# Patient Record
Sex: FEMALE | Race: ASIAN | NOT HISPANIC OR LATINO | URBAN - METROPOLITAN AREA
[De-identification: names, ages, dates, MRNs, and addresses within clinical notes are randomized per-mention and may not be internally consistent; named-entity substitution may affect disease eponyms.]

---

## 2020-05-15 ENCOUNTER — INPATIENT (INPATIENT)
Facility: HOSPITAL | Age: 50
LOS: 12 days | Discharge: HOME | End: 2020-05-28
Attending: HOSPITALIST | Admitting: HOSPITALIST
Payer: COMMERCIAL

## 2020-05-15 VITALS
OXYGEN SATURATION: 98 % | DIASTOLIC BLOOD PRESSURE: 52 MMHG | RESPIRATION RATE: 18 BRPM | HEART RATE: 86 BPM | TEMPERATURE: 100 F | SYSTOLIC BLOOD PRESSURE: 114 MMHG

## 2020-05-15 DIAGNOSIS — R60.1 GENERALIZED EDEMA: ICD-10-CM

## 2020-05-15 DIAGNOSIS — R79.89 OTHER SPECIFIED ABNORMAL FINDINGS OF BLOOD CHEMISTRY: ICD-10-CM

## 2020-05-15 DIAGNOSIS — N17.9 ACUTE KIDNEY FAILURE, UNSPECIFIED: ICD-10-CM

## 2020-05-15 DIAGNOSIS — R91.1 SOLITARY PULMONARY NODULE: ICD-10-CM

## 2020-05-15 DIAGNOSIS — Z98.890 OTHER SPECIFIED POSTPROCEDURAL STATES: Chronic | ICD-10-CM

## 2020-05-15 DIAGNOSIS — D72.829 ELEVATED WHITE BLOOD CELL COUNT, UNSPECIFIED: ICD-10-CM

## 2020-05-15 DIAGNOSIS — Z29.9 ENCOUNTER FOR PROPHYLACTIC MEASURES, UNSPECIFIED: ICD-10-CM

## 2020-05-15 DIAGNOSIS — I95.9 HYPOTENSION, UNSPECIFIED: ICD-10-CM

## 2020-05-15 DIAGNOSIS — D64.9 ANEMIA, UNSPECIFIED: ICD-10-CM

## 2020-05-15 LAB
ALBUMIN SERPL ELPH-MCNC: 1.7 G/DL — LOW (ref 3.3–5)
ALBUMIN SERPL ELPH-MCNC: 1.8 G/DL — LOW (ref 3.3–5)
ALP SERPL-CCNC: 117 U/L — SIGNIFICANT CHANGE UP (ref 40–120)
ALP SERPL-CCNC: 126 U/L — HIGH (ref 40–120)
ALT FLD-CCNC: 76 U/L — HIGH (ref 4–33)
ALT FLD-CCNC: 80 U/L — HIGH (ref 4–33)
ANION GAP SERPL CALC-SCNC: 7 MMO/L — SIGNIFICANT CHANGE UP (ref 7–14)
ANION GAP SERPL CALC-SCNC: 8 MMO/L — SIGNIFICANT CHANGE UP (ref 7–14)
ANISOCYTOSIS BLD QL: SIGNIFICANT CHANGE UP
APTT BLD: 39.7 SEC — HIGH (ref 27.5–36.3)
AST SERPL-CCNC: 149 U/L — HIGH (ref 4–32)
AST SERPL-CCNC: 166 U/L — HIGH (ref 4–32)
BASOPHILS # BLD AUTO: 0.01 K/UL — SIGNIFICANT CHANGE UP (ref 0–0.2)
BASOPHILS NFR BLD AUTO: 0.1 % — SIGNIFICANT CHANGE UP (ref 0–2)
BASOPHILS NFR SPEC: 0 % — SIGNIFICANT CHANGE UP (ref 0–2)
BILIRUB SERPL-MCNC: 1.5 MG/DL — HIGH (ref 0.2–1.2)
BILIRUB SERPL-MCNC: 1.5 MG/DL — HIGH (ref 0.2–1.2)
BLASTS # FLD: 0 % — SIGNIFICANT CHANGE UP (ref 0–0)
BLD GP AB SCN SERPL QL: POSITIVE — SIGNIFICANT CHANGE UP
BUN SERPL-MCNC: 24 MG/DL — HIGH (ref 7–23)
BUN SERPL-MCNC: 24 MG/DL — HIGH (ref 7–23)
CALCIUM SERPL-MCNC: 7 MG/DL — LOW (ref 8.4–10.5)
CALCIUM SERPL-MCNC: 7.1 MG/DL — LOW (ref 8.4–10.5)
CHLORIDE SERPL-SCNC: 110 MMOL/L — HIGH (ref 98–107)
CHLORIDE SERPL-SCNC: 111 MMOL/L — HIGH (ref 98–107)
CO2 SERPL-SCNC: 20 MMOL/L — LOW (ref 22–31)
CO2 SERPL-SCNC: 20 MMOL/L — LOW (ref 22–31)
CREAT SERPL-MCNC: 1.65 MG/DL — HIGH (ref 0.5–1.3)
CREAT SERPL-MCNC: 1.68 MG/DL — HIGH (ref 0.5–1.3)
ELLIPTOCYTES BLD QL SMEAR: SLIGHT — SIGNIFICANT CHANGE UP
EOSINOPHIL # BLD AUTO: 0.09 K/UL — SIGNIFICANT CHANGE UP (ref 0–0.5)
EOSINOPHIL NFR BLD AUTO: 0.6 % — SIGNIFICANT CHANGE UP (ref 0–6)
EOSINOPHIL NFR FLD: 0 % — SIGNIFICANT CHANGE UP (ref 0–6)
FERRITIN SERPL-MCNC: 30.14 NG/ML — SIGNIFICANT CHANGE UP (ref 15–150)
GIANT PLATELETS BLD QL SMEAR: PRESENT — SIGNIFICANT CHANGE UP
GLUCOSE SERPL-MCNC: 100 MG/DL — HIGH (ref 70–99)
GLUCOSE SERPL-MCNC: 81 MG/DL — SIGNIFICANT CHANGE UP (ref 70–99)
HCG SERPL-ACNC: < 5 MIU/ML — SIGNIFICANT CHANGE UP
HCT VFR BLD CALC: 14.5 % — CRITICAL LOW (ref 34.5–45)
HGB BLD-MCNC: 3.8 G/DL — CRITICAL LOW (ref 11.5–15.5)
HYPOCHROMIA BLD QL: SIGNIFICANT CHANGE UP
IMM GRANULOCYTES NFR BLD AUTO: 0.7 % — SIGNIFICANT CHANGE UP (ref 0–1.5)
INR BLD: 1.78 — HIGH (ref 0.88–1.17)
IRON SATN MFR SERPL: 12 UG/DL — LOW (ref 30–160)
IRON SATN MFR SERPL: 173 UG/DL — SIGNIFICANT CHANGE UP (ref 140–530)
LDH SERPL L TO P-CCNC: 730 U/L — HIGH (ref 135–225)
LYMPHOCYTES # BLD AUTO: 0.66 K/UL — LOW (ref 1–3.3)
LYMPHOCYTES # BLD AUTO: 4.2 % — LOW (ref 13–44)
LYMPHOCYTES NFR SPEC AUTO: 0.9 % — LOW (ref 13–44)
MAGNESIUM SERPL-MCNC: 2.1 MG/DL — SIGNIFICANT CHANGE UP (ref 1.6–2.6)
MCHC RBC-ENTMCNC: 18.1 PG — LOW (ref 27–34)
MCHC RBC-ENTMCNC: 26.2 % — LOW (ref 32–36)
MCV RBC AUTO: 69 FL — LOW (ref 80–100)
METAMYELOCYTES # FLD: 0 % — SIGNIFICANT CHANGE UP (ref 0–1)
MICROCYTES BLD QL: SIGNIFICANT CHANGE UP
MONOCYTES # BLD AUTO: 0.79 K/UL — SIGNIFICANT CHANGE UP (ref 0–0.9)
MONOCYTES NFR BLD AUTO: 5 % — SIGNIFICANT CHANGE UP (ref 2–14)
MONOCYTES NFR BLD: 3.5 % — SIGNIFICANT CHANGE UP (ref 2–9)
MYELOCYTES NFR BLD: 0 % — SIGNIFICANT CHANGE UP (ref 0–0)
NEUTROPHIL AB SER-ACNC: 93.9 % — HIGH (ref 43–77)
NEUTROPHILS # BLD AUTO: 14.05 K/UL — HIGH (ref 1.8–7.4)
NEUTROPHILS NFR BLD AUTO: 89.4 % — HIGH (ref 43–77)
NEUTS BAND # BLD: 1.7 % — SIGNIFICANT CHANGE UP (ref 0–6)
NRBC # FLD: 0 K/UL — SIGNIFICANT CHANGE UP (ref 0–0)
NT-PROBNP SERPL-SCNC: 3601 PG/ML — SIGNIFICANT CHANGE UP
OB PNL STL: NEGATIVE — SIGNIFICANT CHANGE UP
OTHER - HEMATOLOGY %: 0 — SIGNIFICANT CHANGE UP
OVALOCYTES BLD QL SMEAR: SIGNIFICANT CHANGE UP
PHOSPHATE SERPL-MCNC: 3 MG/DL — SIGNIFICANT CHANGE UP (ref 2.5–4.5)
PLATELET # BLD AUTO: 131 K/UL — LOW (ref 150–400)
PLATELET COUNT - ESTIMATE: SIGNIFICANT CHANGE UP
PMV BLD: SIGNIFICANT CHANGE UP FL (ref 7–13)
POIKILOCYTOSIS BLD QL AUTO: SIGNIFICANT CHANGE UP
POLYCHROMASIA BLD QL SMEAR: SIGNIFICANT CHANGE UP
POTASSIUM SERPL-MCNC: 3.1 MMOL/L — LOW (ref 3.5–5.3)
POTASSIUM SERPL-MCNC: 3.4 MMOL/L — LOW (ref 3.5–5.3)
POTASSIUM SERPL-SCNC: 3.1 MMOL/L — LOW (ref 3.5–5.3)
POTASSIUM SERPL-SCNC: 3.4 MMOL/L — LOW (ref 3.5–5.3)
PROMYELOCYTES # FLD: 0 % — SIGNIFICANT CHANGE UP (ref 0–0)
PROT SERPL-MCNC: 4.9 G/DL — LOW (ref 6–8.3)
PROT SERPL-MCNC: 5 G/DL — LOW (ref 6–8.3)
PROTHROM AB SERPL-ACNC: 20.7 SEC — HIGH (ref 9.8–13.1)
RBC # BLD: 2.1 M/UL — LOW (ref 3.8–5.2)
RBC # FLD: 21.2 % — HIGH (ref 10.3–14.5)
REVIEW TO FOLLOW: YES — SIGNIFICANT CHANGE UP
RH IG SCN BLD-IMP: POSITIVE — SIGNIFICANT CHANGE UP
RH IG SCN BLD-IMP: POSITIVE — SIGNIFICANT CHANGE UP
SARS-COV-2 RNA SPEC QL NAA+PROBE: SIGNIFICANT CHANGE UP
SCHISTOCYTES BLD QL AUTO: SLIGHT — SIGNIFICANT CHANGE UP
SODIUM SERPL-SCNC: 137 MMOL/L — SIGNIFICANT CHANGE UP (ref 135–145)
SODIUM SERPL-SCNC: 139 MMOL/L — SIGNIFICANT CHANGE UP (ref 135–145)
TROPONIN T, HIGH SENSITIVITY: 77 NG/L — CRITICAL HIGH (ref ?–14)
TROPONIN T, HIGH SENSITIVITY: 85 NG/L — CRITICAL HIGH (ref ?–14)
UIBC SERPL-MCNC: 161 UG/DL — SIGNIFICANT CHANGE UP (ref 110–370)
VARIANT LYMPHS # BLD: 0 % — SIGNIFICANT CHANGE UP
WBC # BLD: 15.71 K/UL — HIGH (ref 3.8–10.5)
WBC # FLD AUTO: 15.71 K/UL — HIGH (ref 3.8–10.5)

## 2020-05-15 PROCEDURE — 71045 X-RAY EXAM CHEST 1 VIEW: CPT | Mod: 26

## 2020-05-15 PROCEDURE — 74176 CT ABD & PELVIS W/O CONTRAST: CPT | Mod: 26

## 2020-05-15 PROCEDURE — 99223 1ST HOSP IP/OBS HIGH 75: CPT | Mod: GC

## 2020-05-15 RX ORDER — VANCOMYCIN HCL 1 G
1000 VIAL (EA) INTRAVENOUS ONCE
Refills: 0 | Status: COMPLETED | OUTPATIENT
Start: 2020-05-15 | End: 2020-05-15

## 2020-05-15 RX ORDER — ALBUTEROL 90 UG/1
2 AEROSOL, METERED ORAL EVERY 6 HOURS
Refills: 0 | Status: DISCONTINUED | OUTPATIENT
Start: 2020-05-15 | End: 2020-05-28

## 2020-05-15 RX ORDER — CETIRIZINE HYDROCHLORIDE 10 MG/1
1 TABLET ORAL
Qty: 0 | Refills: 0 | DISCHARGE

## 2020-05-15 RX ORDER — ACETAMINOPHEN 500 MG
975 TABLET ORAL ONCE
Refills: 0 | Status: COMPLETED | OUTPATIENT
Start: 2020-05-15 | End: 2020-05-15

## 2020-05-15 RX ORDER — POTASSIUM CHLORIDE 20 MEQ
1 PACKET (EA) ORAL
Qty: 0 | Refills: 0 | DISCHARGE

## 2020-05-15 RX ORDER — ALBUMIN HUMAN 25 %
50 VIAL (ML) INTRAVENOUS ONCE
Refills: 0 | Status: COMPLETED | OUTPATIENT
Start: 2020-05-15 | End: 2020-05-15

## 2020-05-15 RX ORDER — PIPERACILLIN AND TAZOBACTAM 4; .5 G/20ML; G/20ML
3.38 INJECTION, POWDER, LYOPHILIZED, FOR SOLUTION INTRAVENOUS ONCE
Refills: 0 | Status: COMPLETED | OUTPATIENT
Start: 2020-05-15 | End: 2020-05-15

## 2020-05-15 RX ORDER — ALBUTEROL 90 UG/1
2 AEROSOL, METERED ORAL
Qty: 0 | Refills: 0 | DISCHARGE

## 2020-05-15 RX ORDER — PIPERACILLIN AND TAZOBACTAM 4; .5 G/20ML; G/20ML
3.38 INJECTION, POWDER, LYOPHILIZED, FOR SOLUTION INTRAVENOUS EVERY 8 HOURS
Refills: 0 | Status: DISCONTINUED | OUTPATIENT
Start: 2020-05-15 | End: 2020-05-16

## 2020-05-15 RX ORDER — ALBUMIN HUMAN 25 %
50 VIAL (ML) INTRAVENOUS ONCE
Refills: 0 | Status: DISCONTINUED | OUTPATIENT
Start: 2020-05-15 | End: 2020-05-15

## 2020-05-15 RX ORDER — VANCOMYCIN HCL 1 G
1000 VIAL (EA) INTRAVENOUS EVERY 24 HOURS
Refills: 0 | Status: DISCONTINUED | OUTPATIENT
Start: 2020-05-15 | End: 2020-05-16

## 2020-05-15 RX ORDER — POTASSIUM CHLORIDE 20 MEQ
40 PACKET (EA) ORAL EVERY 4 HOURS
Refills: 0 | Status: COMPLETED | OUTPATIENT
Start: 2020-05-15 | End: 2020-05-16

## 2020-05-15 RX ADMIN — Medication 40 MILLIEQUIVALENT(S): at 23:36

## 2020-05-15 RX ADMIN — Medication 1000 MILLIGRAM(S): at 17:48

## 2020-05-15 RX ADMIN — Medication 975 MILLIGRAM(S): at 14:45

## 2020-05-15 RX ADMIN — Medication 40 MILLIEQUIVALENT(S): at 18:59

## 2020-05-15 RX ADMIN — PIPERACILLIN AND TAZOBACTAM 200 GRAM(S): 4; .5 INJECTION, POWDER, LYOPHILIZED, FOR SOLUTION INTRAVENOUS at 14:45

## 2020-05-15 RX ADMIN — Medication 50 MILLILITER(S): at 17:46

## 2020-05-15 RX ADMIN — Medication 250 MILLIGRAM(S): at 15:13

## 2020-05-15 NOTE — ED ADULT NURSE REASSESSMENT NOTE - NS ED NURSE REASSESS COMMENT FT1
Break coverage RN: PT A&Ox4, denies pain discomfort, SOB/ dyspnea at this time. PT hypotensive as noted, no change in symptoms or mental status at this time. MAR Md notified and aware. Contacted blood bank, PRBCs not ready at this time. Albumin infused as per MD and orders. VS as noted. Will continue to monitor for safety and comfort.

## 2020-05-15 NOTE — H&P ADULT - PROBLEM SELECTOR PLAN 5
Trop elevated to 85 initially, now downtrending, patient not with CP, EKG without evidence to suggest ischemic event, suspect in setting of demand supply mismatch and worsening kidney function   -monitor   -obtain cardiac echo

## 2020-05-15 NOTE — H&P ADULT - NSICDXFAMILYHX_GEN_ALL_CORE_FT
FAMILY HISTORY:  FH: hypertension, Father  FH: ovarian cancer, Sister  FH: type 2 diabetes, Mother and Father

## 2020-05-15 NOTE — H&P ADULT - NSHPREVIEWOFSYSTEMS_GEN_ALL_CORE
Constitutional: denies fevers, chills, night sweats, weight loss,+fatigue,+decreased appetite and PO intake, +generalized weakness   HEENT: denies visual changes, hearing changes, rhinitis, odynophagia, or dysphagia  Cardiovascular: denies palpitations, chest pain, +BLE edema  Respiratory: denies SOB, wheezing  Gastrointestinal: denies N/V/D, hematochezia, melena, +abdominal swelling   : denies dysuria, hematuria  MSK: denies weakness, joint pain  Neuro: no numbness or tingling  Psych: no depression or anxiety  Skin: denies new rashes or masses

## 2020-05-15 NOTE — H&P ADULT - PROBLEM SELECTOR PLAN 7
Likely in setting of hepatic congestion, ascites demonstrated on CT but no evidence of cirrhosis   -f/u Hep panel  -monitor

## 2020-05-15 NOTE — ED ADULT NURSE NOTE - OBJECTIVE STATEMENT
Pt received in red room #3. C/o generalized weakness, SOB, and low Hgb (4.0). Pancreatic tumor removed in 2011. Pt verbalized swelling started about 2 months ago. Weakness started "2 weeks ago." Was prescribed Lasix  yesterday and only took it once. Pt A&Ox4, ambulatory at baseline but unable to ambulate due to weakness. Breathing even and unlabored, complains of SOB on exertion. RR 20 and Spo2 100 on room air. Sinus rhythm on the monitor, HR 88. Denies chest pain or palpitations. Pt febrile with 100.1F rectal. 4+ pitting edema on lower extremities bilat. Edema also noted on left extremity and throughout body. Stage 1 pressure ulcer noted on left buttock. 20G IV placed on right AC. Labs drawn and sent. MD at bedside. Will continue to monitor. Pt received in red room #3. C/o generalized weakness, SOB, and low Hgb (4.0). Pancreatic tumor removed in 2011. Pt verbalized swelling started about 2 months ago. Weakness started "2 weeks ago." Was prescribed Lasix  yesterday and only took it once. Pt A&Ox4, ambulatory at baseline but unable to ambulate due to weakness. Breathing even and unlabored, complains of SOB on exertion. RR 20 and Spo2 100 on room air. Sinus rhythm on the monitor, HR 88. Denies chest pain or palpitations. Pt febrile with 100.1F rectal. 4+ pitting edema on lower extremities bilat. Edema also noted on left extremity and throughout body. Abdomen distended, firm to touch and non-tender. Stage 1 pressure ulcer noted on left buttock. 20G IV placed on right AC. Labs drawn and sent. MD at bedside. Will continue to monitor.

## 2020-05-15 NOTE — ED PROVIDER NOTE - PHYSICAL EXAMINATION
*Gen: appears pale, AAO*3  *HEENT: NC/AT, dry mucous membranes, airway patent, trachea midline  *CV: RRR, S1/S2 present, holosystolic murmur, 4+ pitting edema  *Resp: no respiratory distress, LCTAB, no wheezing/rales  *Abd: distended, soft N/Tx4, no guarding or rigidity  *Neuro: no focal neuro deficits, moving all limbs appropriately  *Extremities: no gross deformity, wasting of the UE  *Skin: no rashes  ~ Zbigniew Cochran M.D.

## 2020-05-15 NOTE — ED ADULT NURSE REASSESSMENT NOTE - NS ED NURSE REASSESS COMMENT FT1
Pt is A&Ox4, denies SOB, chest pain, dizziness, or pruritus. Pt resting, denies any discomfort. Will continue to monitor.

## 2020-05-15 NOTE — H&P ADULT - PROBLEM SELECTOR PLAN 2
Pt with hypotension to the 90s/40s.  Suspect in setting of intravascular depletion and severe anemia.   -will hold lasix until BP improves  -blood transfusion as above  -s/p albumin 25% 50ml, can give additional 50  -if persists can give midodrine and consider MICU eval

## 2020-05-15 NOTE — H&P ADULT - PROBLEM SELECTOR PLAN 8
Pt with pulmonary nodules not on CT scan, could possibly be malignancy  -will obtain CT chest to further characterize Pt with pulmonary nodules not on CT scan, could possibly be malignancy  -will obtain CT chest to further characterize  results d/w patinet

## 2020-05-15 NOTE — H&P ADULT - ATTENDING COMMENTS
Patient seen and examined.  Case discussed with house staff.  Agree with above as edited.   Patient is a 49yoF with h/o pancreatic mass (benign per patient) s/p Whipple in 2011 who presents to ED with weakness, edema and anemia found on blood work in PMD's office yesterday. Found to have anemia with mild hypotension, borderline fever, leukocytosis.   Anemia with hypotension - transfusion of pRBCs pending - given presence of Abs, match is taking a little longer than usual - giving albumin in interim. Monitor post tx CBC.  Pulmonary nodules - patient with h/o pancreatic mass s/p resection. Dedicated CT chest ordered.  Leukocytosis - given hypotension and borderline fever, will cover empirically with broad spectrum Abx. BCx pending  SCOTT vs CKD - suspect SCOTT due to 3rd spacing - monitor Cr post pRBCs  Anasarca - Will hold lasix for now given hypotension. May require eventual paracentesis but will defer until BP stable.  Hypokalemia - Replete with KCl and monitor.   VTE ppx with PAS.   COVID PCR pending  d/w patient  d/w HS

## 2020-05-15 NOTE — H&P ADULT - ASSESSMENT
49 year-old female with history of pancreatic mass (benign as per patient)  status-post resection in 2011 (s/p whipple) who presented to the ED sent in from her PCP office due to abnormal labs and weakness. Patient found with microcytic anemia to 3.6. Also with thrombocytopenia, leukocytosis, elevated LFTS. Also with hypotension in the setting of decreased intravascular volume. Also with SCOTT.

## 2020-05-15 NOTE — H&P ADULT - HISTORY OF PRESENT ILLNESS
49 year-old female with history of pancreatic mass (benign as per patient)  status-post resection in 2011 (s/p whipple) who presented to the ED sent in from her PCP office due to abnormal labs and weakness. Patient stated that for the past year she has been experience chronic fatigue, however, for the past three days has been feeling significantly worse, to the point that she was unable to ambulate on her own. She has also noticed diffuse swelling of the abdomen and bilateral lower extremities. Patient visited a new PCP yesterday (first doctor's visit in multiple years), and had blood work done which showed leukocytosis of 20; hb 3.9/hct 15, Cr 1.68 and  transaminitis. In regards to her pancreatic mass, patient said it was discovered when she was pregnant with her second child, and that the mass was benign, but was involved with multiple organs so in addition to resection of the pancreatic head, they had to remove the GB, as well as parts of the  stomach and duodenum. She stated the procedure was done at [facility], however she never had follow-up because she relocated and "could not find a doctor." She denied CP, lightheadness, nausea, vomiting, diarrhea, constipation, melena, hematochezia, hematemesis. She did however note decreased appetite and PO intake.

## 2020-05-15 NOTE — ED PROVIDER NOTE - OBJECTIVE STATEMENT
49 year-old female with history of pancreatic nodule status-post resection in 2011 (w/ GB,     leukocytosis of 20; hb 3.9 hct 15  transaminitis 49 year-old female with history of pancreatic nodule status-post resection in 2011 (w/ GB, partial stomach and duodenum resection) presents to the Emergency Department for weakness.  Patient reports she has been feeling unwell for the past 1-2 months with diffuse swelling of the LE and abdomen.  The past three days patient has been increasingly weak and bed-bound; unable to support herself or walk.  No fevers, chills, nausea, vomiting, chest pain, shortness of breath, abdominal pain.  Patient does not know if she had any BIS.  Seen by Leidy York ([phone]) for the first time and had routine blood work performed which showed leukocytosis of 20; hb 3.9/hct 15, Cr 1.68 and  transaminitis.  No prior lab work to compare.

## 2020-05-15 NOTE — ED ADULT TRIAGE NOTE - CHIEF COMPLAINT QUOTE
c/o generalized weakness and SOB (at night) x 2 days. Pt also has BLE edema x 2-3 months. Pt had blood test done yesterday. Was told this morning Hgb 4.0. Denies any fever/chills, cough. Hx Pancreatic tumor removed 2011.

## 2020-05-15 NOTE — ED PROVIDER NOTE - CLINICAL SUMMARY MEDICAL DECISION MAKING FREE TEXT BOX
49 year-old female with history of pancreatic nodule status-post resection in 2011 (w/ GB, partial stomach and duodenum resection) presents to the Emergency Department for weakness with anasarca.  Symptoms likely secondary to anemia and compounded by decompensated CHF (given anasarca), third spacing from oncological cause.  Febrile to 100.1 in ED; blood cultures ordered.  Plan for blood work, CT, COVID and TBA.

## 2020-05-15 NOTE — H&P ADULT - PROBLEM SELECTOR PLAN 3
Pt with Cr 1.68, unclear baseline, suspect pre-renal SCOTT in the setting of intravascular depletion/ third spacing and hypotension   -albumin and blood as above  -patient will need diuresis when blood pressure stabilizes   -monitor Cr and avoid nephrotoxic agents/ renally dose meds  -f/u UA

## 2020-05-15 NOTE — ED ADULT NURSE REASSESSMENT NOTE - NS ED NURSE REASSESS COMMENT FT1
1st unit of PRBC's initiated infusing at 155ml/hr, ivl is clear and patent, pt made aware of signs and symptoms in which to alert the medical staff, pt is nsr on monitor, breathing even and unlabored, denies any present pain, afebrile at present. Will continue to monitor.

## 2020-05-15 NOTE — ED PROVIDER NOTE - ATTENDING CONTRIBUTION TO CARE
DR. YEE, ATTENDING MD-  I performed a face to face bedside interview with the patient regarding history of present illness, review of symptoms and past medical history. I completed an independent physical exam.  I have discussed the patient's plan of care with the resident.   Documentation as above in the note.    48 y/o female sent in by pcp with anemia.  Worsening edema and fatigue over past few months.  H/o pancreatic nodule resection.  Concern for infection neoplastic process lyte abn chf.  Will need admission.

## 2020-05-15 NOTE — ED ADULT NURSE REASSESSMENT NOTE - NS ED NURSE REASSESS COMMENT FT1
Pt a&ox3, blood bank contacted in order to release PRBC's, blood bank tech stated that they were still preparing the blood for the pt. MD made aware and questioned if she would want the pt to receive uncrossmatched blood, MD stated it was unnecessary at the time, pt is asymptomatic at rest, breathing even and unlabored at rest, denies any present pain, no headache/dizziness/lightheadedness, nsr on monitor, vs taken and documented, ivl clear and patent, pt has iv abx infusing. Will continue to monitor.

## 2020-05-15 NOTE — H&P ADULT - NSHPPHYSICALEXAM_GEN_ALL_CORE
Vital Signs Last 24 Hrs  T(C): 36.7 (15 May 2020 17:50), Max: 37.8 (15 May 2020 13:50)  T(F): 98.1 (15 May 2020 17:50), Max: 100.1 (15 May 2020 13:50)  HR: 77 (15 May 2020 17:50) (73 - 88)  BP: 90/51 (15 May 2020 17:50) (90/51 - 115/54)  BP(mean): 59 (15 May 2020 17:50) (59 - 59)  RR: 19 (15 May 2020 17:50) (18 - 20)  SpO2: 100% (15 May 2020 17:50) (96% - 100%)    PHYSICAL EXAM:  GENERAL: NAD, well-developed  HEAD: Atraumatic, Normocephalic  EYES: EOMI, PERRLA, conjunctiva and sclera clear  NECK: Supple, No JVD  CHEST/LUNG: Clear to auscultation bilaterally; No wheezes/rales/rhonchi  HEART: Regular rate and rhythm; No murmurs, rubs, or gallops  ABDOMEN: Soft, Nontender, Nondistended; Bowel sounds present  EXTREMITIES:  2+ dP pulses b/l, No clubbing, cyanosis, or edema  PSYCH: reactive affect  NEUROLOGY: AAOx3, non-focal  SKIN: No rashes or lesions Vital Signs Last 24 Hrs  T(C): 36.7 (15 May 2020 17:50), Max: 37.8 (15 May 2020 13:50)  T(F): 98.1 (15 May 2020 17:50), Max: 100.1 (15 May 2020 13:50)  HR: 77 (15 May 2020 17:50) (73 - 88)  BP: 90/51 (15 May 2020 17:50) (90/51 - 115/54)  BP(mean): 59 (15 May 2020 17:50) (59 - 59)  RR: 19 (15 May 2020 17:50) (18 - 20)  SpO2: 100% (15 May 2020 17:50) (96% - 100%)    PHYSICAL EXAM:  GENERAL: NAD,  HEAD: Atraumatic, Normocephalic  EYES: EOMI, PERRLA, conjunctiva and sclera clear  NECK: Supple, No JVD  CHEST/LUNG: Decreased breath sounds in bases,   HEART: Regular rate and rhythm; Holosystolic murmur , rubs, or gallops  ABDOMEN: Soft, Nontender, distended with fluid; Bowel sounds present  EXTREMITIES:  2+ dP pulses b/l, No clubbing, cyanosis, 4+pitting edema in BLE   PSYCH: reactive affect  NEUROLOGY: AAOx3, non-focal  SKIN: pale Vital Signs Last 24 Hrs  T(C): 36.7 (15 May 2020 17:50), Max: 37.8 (15 May 2020 13:50)  T(F): 98.1 (15 May 2020 17:50), Max: 100.1 (15 May 2020 13:50)  HR: 77 (15 May 2020 17:50) (73 - 88)  BP: 90/51 (15 May 2020 17:50) (90/51 - 115/54)  BP(mean): 59 (15 May 2020 17:50) (59 - 59)  RR: 19 (15 May 2020 17:50) (18 - 20)  SpO2: 100% (15 May 2020 17:50) (96% - 100%)    PHYSICAL EXAM:  GENERAL: NAD,  HEAD: Atraumatic, Normocephalic  EYES: EOMI, PERRLA, conjunctiva and sclera clear  NECK: Supple, No JVD  CHEST/LUNG: Decreased breath sounds in bases,   HEART: Regular rate and rhythm; Crescendo-decrescendo systolic murmur loudest at RUSB, rubs, or gallops  ABDOMEN: Soft, Nontender, distended with fluid; Bowel sounds present  EXTREMITIES:  2+ dP pulses b/l, No clubbing, cyanosis, 4+pitting edema in BLE   PSYCH: reactive affect  NEUROLOGY: AAOx3, non-focal  SKIN: pale

## 2020-05-15 NOTE — H&P ADULT - PROBLEM SELECTOR PLAN 4
Pt with anasarca  (significant LLE and abdominal swelling)  -in the setting of decreased oncotic pressure/ hypoalbuminemia and mild hypoproteinemia   -albumin as above  -will need diuresis when BP stable vs ?paracentesis  -cardiac echo to assess for HF component

## 2020-05-15 NOTE — H&P ADULT - NSHPLABSRESULTS_GEN_ALL_CORE
Personally reviewed labs.   Personally reviewed imaging.   Personally reviewed EKG.     EKG: Rate 87, , QRS 74, QT//399    < from: CT Abdomen and Pelvis No Cont (05.15.20 @ 15:35) >      EXAM:  CT ABDOMEN AND PELVIS        PROCEDURE DATE:  May 15 2020   IMPRESSION:     Very limited noncontrast examination. Suggest repeat examination with contrast and premedication if appropriate.  Gross ascites.  Marked anasarca.  Mild cardiomegaly.  Several pulmonary nodules. Consider full chest CT.  Probable Whipple procedure.      < from: Xray Chest 1 View AP/PA (05.15.20 @ 14:31) >      EXAM:  XR CHEST AP OR PA 1V        PROCEDURE DATE:  May 15 2020 IMPRESSION:  Bibasilar haziness with indistinct hemidiaphragms and CP angles compatible with small pleural effusions right greater than left. Concomitant underlying airspace consolidative changes including from infectious etiologies cannot be excluded. Grossly clear visualized mid and upper lungs. No pneumothorax.    Prominent appearing cardiac and mediastinal silhouettes however inaccurately assessed on this projection.    Trachea midline.     Unremarkable osseous structures.      < end of copied text >

## 2020-05-15 NOTE — H&P ADULT - PROBLEM SELECTOR PLAN 1
Pt with anemia to 3.8 and chronic fatigue. Microcytic. Suspect iron deficiency vs anemia of chronic illness. Low suspicion for GI Bleed. Concerning for malignancy given she had pancreatic mass (unclear hx). Also consider hemolysis  -f/u iron studies, LDH, haptoglobin, retic count  -transfusing 2 U PRBC, Follow up post transfusion CBC  -monitor HgB and transfuse if <7 Pt with anemia to 3.8 and chronic fatigue. Microcytic. Suspect iron deficiency vs anemia of chronic illness. Low suspicion for GI Bleed. Concerning for malignancy given she had pancreatic mass (unclear hx). Also consider hemolysis  -f/u iron studies, LDH, haptoglobin, retic count  -transfusing 2 U PRBC, Follow up post transfusion CBC  -monitor HgB and transfuse if <7  -a/w generalized weakness; PT consult

## 2020-05-16 ENCOUNTER — RESULT REVIEW (OUTPATIENT)
Age: 50
End: 2020-05-16

## 2020-05-16 LAB
ALBUMIN FLD-MCNC: 0.4 G/DL — SIGNIFICANT CHANGE UP
ALBUMIN SERPL ELPH-MCNC: 1.7 G/DL — LOW (ref 3.3–5)
ALP SERPL-CCNC: 115 U/L — SIGNIFICANT CHANGE UP (ref 40–120)
ALT FLD-CCNC: 67 U/L — HIGH (ref 4–33)
ANION GAP SERPL CALC-SCNC: 7 MMO/L — SIGNIFICANT CHANGE UP (ref 7–14)
APPEARANCE UR: CLEAR — SIGNIFICANT CHANGE UP
APPEARANCE UR: CLEAR — SIGNIFICANT CHANGE UP
AST SERPL-CCNC: 94 U/L — HIGH (ref 4–32)
BASOPHILS # BLD AUTO: 0.02 K/UL — SIGNIFICANT CHANGE UP (ref 0–0.2)
BASOPHILS # BLD AUTO: 0.03 K/UL — SIGNIFICANT CHANGE UP (ref 0–0.2)
BASOPHILS NFR BLD AUTO: 0.2 % — SIGNIFICANT CHANGE UP (ref 0–2)
BASOPHILS NFR BLD AUTO: 0.3 % — SIGNIFICANT CHANGE UP (ref 0–2)
BILIRUB DIRECT SERPL-MCNC: 1.5 MG/DL — HIGH (ref 0.1–0.2)
BILIRUB SERPL-MCNC: 2.6 MG/DL — HIGH (ref 0.2–1.2)
BILIRUB SERPL-MCNC: 3.3 MG/DL — HIGH (ref 0.2–1.2)
BILIRUB UR-MCNC: NEGATIVE — SIGNIFICANT CHANGE UP
BILIRUB UR-MCNC: NEGATIVE — SIGNIFICANT CHANGE UP
BLOOD UR QL VISUAL: NEGATIVE — SIGNIFICANT CHANGE UP
BLOOD UR QL VISUAL: NEGATIVE — SIGNIFICANT CHANGE UP
BODY FLUID TYPE: SIGNIFICANT CHANGE UP
BUN SERPL-MCNC: 23 MG/DL — SIGNIFICANT CHANGE UP (ref 7–23)
CALCIUM SERPL-MCNC: 7.2 MG/DL — LOW (ref 8.4–10.5)
CHLORIDE SERPL-SCNC: 110 MMOL/L — HIGH (ref 98–107)
CLARITY SPEC: SIGNIFICANT CHANGE UP
CO2 SERPL-SCNC: 21 MMOL/L — LOW (ref 22–31)
COLOR FLD: YELLOW — SIGNIFICANT CHANGE UP
COLOR SPEC: YELLOW — SIGNIFICANT CHANGE UP
COLOR SPEC: YELLOW — SIGNIFICANT CHANGE UP
CREAT SERPL-MCNC: 1.5 MG/DL — HIGH (ref 0.5–1.3)
EOSINOPHIL # BLD AUTO: 0.15 K/UL — SIGNIFICANT CHANGE UP (ref 0–0.5)
EOSINOPHIL # BLD AUTO: 0.15 K/UL — SIGNIFICANT CHANGE UP (ref 0–0.5)
EOSINOPHIL NFR BLD AUTO: 1.3 % — SIGNIFICANT CHANGE UP (ref 0–6)
EOSINOPHIL NFR BLD AUTO: 1.6 % — SIGNIFICANT CHANGE UP (ref 0–6)
GLUCOSE FLD-MCNC: 99 MG/DL — SIGNIFICANT CHANGE UP
GLUCOSE SERPL-MCNC: 84 MG/DL — SIGNIFICANT CHANGE UP (ref 70–99)
GLUCOSE UR-MCNC: NEGATIVE — SIGNIFICANT CHANGE UP
GLUCOSE UR-MCNC: NEGATIVE — SIGNIFICANT CHANGE UP
GRAM STN FLD: SIGNIFICANT CHANGE UP
HAPTOGLOB SERPL-MCNC: 35 MG/DL — SIGNIFICANT CHANGE UP (ref 34–200)
HAPTOGLOB SERPL-MCNC: 61 MG/DL — SIGNIFICANT CHANGE UP (ref 34–200)
HAV IGM SER-ACNC: NONREACTIVE — SIGNIFICANT CHANGE UP
HBV CORE IGM SER-ACNC: NONREACTIVE — SIGNIFICANT CHANGE UP
HBV SURFACE AG SER-ACNC: NONREACTIVE — SIGNIFICANT CHANGE UP
HCT VFR BLD CALC: 21.6 % — LOW (ref 34.5–45)
HCT VFR BLD CALC: 23.7 % — LOW (ref 34.5–45)
HCT VFR BLD CALC: 32.4 % — LOW (ref 34.5–45)
HCV AB S/CO SERPL IA: 0.22 S/CO — SIGNIFICANT CHANGE UP (ref 0–0.99)
HCV AB SERPL-IMP: SIGNIFICANT CHANGE UP
HGB BLD-MCNC: 6.4 G/DL — CRITICAL LOW (ref 11.5–15.5)
HGB BLD-MCNC: 7.1 G/DL — LOW (ref 11.5–15.5)
HGB BLD-MCNC: 9.9 G/DL — LOW (ref 11.5–15.5)
IMM GRANULOCYTES NFR BLD AUTO: 0.7 % — SIGNIFICANT CHANGE UP (ref 0–1.5)
IMM GRANULOCYTES NFR BLD AUTO: 1 % — SIGNIFICANT CHANGE UP (ref 0–1.5)
KETONES UR-MCNC: NEGATIVE — SIGNIFICANT CHANGE UP
KETONES UR-MCNC: NEGATIVE — SIGNIFICANT CHANGE UP
LDH SERPL L TO P-CCNC: 124 U/L — SIGNIFICANT CHANGE UP
LDH SERPL L TO P-CCNC: 411 U/L — HIGH (ref 135–225)
LDH SERPL L TO P-CCNC: 450 U/L — HIGH (ref 135–225)
LEUKOCYTE ESTERASE UR-ACNC: NEGATIVE — SIGNIFICANT CHANGE UP
LEUKOCYTE ESTERASE UR-ACNC: NEGATIVE — SIGNIFICANT CHANGE UP
LYMPHOCYTES # BLD AUTO: 0.55 K/UL — LOW (ref 1–3.3)
LYMPHOCYTES # BLD AUTO: 0.57 K/UL — LOW (ref 1–3.3)
LYMPHOCYTES # BLD AUTO: 5 % — LOW (ref 13–44)
LYMPHOCYTES # BLD AUTO: 5.8 % — LOW (ref 13–44)
LYMPHOCYTES NFR FLD: 13 % — SIGNIFICANT CHANGE UP
MACROPHAGES # FLD: 13 % — SIGNIFICANT CHANGE UP
MAGNESIUM SERPL-MCNC: 2.1 MG/DL — SIGNIFICANT CHANGE UP (ref 1.6–2.6)
MANUAL SMEAR VERIFICATION: SIGNIFICANT CHANGE UP
MANUAL SMEAR VERIFICATION: SIGNIFICANT CHANGE UP
MCHC RBC-ENTMCNC: 23.2 PG — LOW (ref 27–34)
MCHC RBC-ENTMCNC: 23.3 PG — LOW (ref 27–34)
MCHC RBC-ENTMCNC: 24.4 PG — LOW (ref 27–34)
MCHC RBC-ENTMCNC: 29.6 % — LOW (ref 32–36)
MCHC RBC-ENTMCNC: 30 % — LOW (ref 32–36)
MCHC RBC-ENTMCNC: 30.6 % — LOW (ref 32–36)
MCV RBC AUTO: 77.7 FL — LOW (ref 80–100)
MCV RBC AUTO: 78.3 FL — LOW (ref 80–100)
MCV RBC AUTO: 79.8 FL — LOW (ref 80–100)
MESOTHL CELL # FLD: 5 % — SIGNIFICANT CHANGE UP
MONOCYTES # BLD AUTO: 0.79 K/UL — SIGNIFICANT CHANGE UP (ref 0–0.9)
MONOCYTES # BLD AUTO: 1.11 K/UL — HIGH (ref 0–0.9)
MONOCYTES # FLD: 8 % — SIGNIFICANT CHANGE UP
MONOCYTES NFR BLD AUTO: 8.3 % — SIGNIFICANT CHANGE UP (ref 2–14)
MONOCYTES NFR BLD AUTO: 9.7 % — SIGNIFICANT CHANGE UP (ref 2–14)
NEUTROPHILS # BLD AUTO: 7.98 K/UL — HIGH (ref 1.8–7.4)
NEUTROPHILS # BLD AUTO: 9.5 K/UL — HIGH (ref 1.8–7.4)
NEUTROPHILS NFR BLD AUTO: 82.7 % — HIGH (ref 43–77)
NEUTROPHILS NFR BLD AUTO: 83.4 % — HIGH (ref 43–77)
NEUTS SEG NFR FLD MANUAL: 61 % — SIGNIFICANT CHANGE UP
NITRITE UR-MCNC: NEGATIVE — SIGNIFICANT CHANGE UP
NITRITE UR-MCNC: NEGATIVE — SIGNIFICANT CHANGE UP
NRBC # FLD: 0 K/UL — SIGNIFICANT CHANGE UP (ref 0–0)
NRBC # FLD: 0.02 K/UL — SIGNIFICANT CHANGE UP (ref 0–0)
NRBC # FLD: 0.04 K/UL — SIGNIFICANT CHANGE UP (ref 0–0)
PH UR: 6 — SIGNIFICANT CHANGE UP (ref 5–8)
PH UR: 6 — SIGNIFICANT CHANGE UP (ref 5–8)
PHOSPHATE SERPL-MCNC: 3 MG/DL — SIGNIFICANT CHANGE UP (ref 2.5–4.5)
PLATELET # BLD AUTO: 102 K/UL — LOW (ref 150–400)
PLATELET # BLD AUTO: 111 K/UL — LOW (ref 150–400)
PLATELET # BLD AUTO: 99 K/UL — LOW (ref 150–400)
PMV BLD: SIGNIFICANT CHANGE UP FL (ref 7–13)
POTASSIUM SERPL-MCNC: 3.7 MMOL/L — SIGNIFICANT CHANGE UP (ref 3.5–5.3)
POTASSIUM SERPL-SCNC: 3.7 MMOL/L — SIGNIFICANT CHANGE UP (ref 3.5–5.3)
PROT FLD-MCNC: 1.6 G/DL — SIGNIFICANT CHANGE UP
PROT SERPL-MCNC: 5 G/DL — LOW (ref 6–8.3)
PROT UR-MCNC: 10 — SIGNIFICANT CHANGE UP
PROT UR-MCNC: 10 — SIGNIFICANT CHANGE UP
RBC # BLD: 2.76 M/UL — LOW (ref 3.8–5.2)
RBC # BLD: 3.05 M/UL — LOW (ref 3.8–5.2)
RBC # BLD: 4.06 M/UL — SIGNIFICANT CHANGE UP (ref 3.8–5.2)
RBC # FLD: 21 % — HIGH (ref 10.3–14.5)
RBC # FLD: 22.1 % — HIGH (ref 10.3–14.5)
RBC # FLD: 23.4 % — HIGH (ref 10.3–14.5)
RCV VOL RI: 2000 CELL/UL — HIGH (ref 0–5)
SODIUM SERPL-SCNC: 138 MMOL/L — SIGNIFICANT CHANGE UP (ref 135–145)
SP GR SPEC: 1.02 — SIGNIFICANT CHANGE UP (ref 1–1.04)
SP GR SPEC: 1.02 — SIGNIFICANT CHANGE UP (ref 1–1.04)
SPECIMEN SOURCE: SIGNIFICANT CHANGE UP
TOTAL CELLS COUNTED, BODY FLUID: 100 CELLS — SIGNIFICANT CHANGE UP
TOTAL NUCLEATED CELL COUNT, BODY FLUID: 19 CELL/UL — HIGH (ref 0–5)
TROPONIN T, HIGH SENSITIVITY: 69 NG/L — CRITICAL HIGH (ref ?–14)
UROBILINOGEN FLD QL: NORMAL — SIGNIFICANT CHANGE UP
UROBILINOGEN FLD QL: NORMAL — SIGNIFICANT CHANGE UP
WBC # BLD: 10.93 K/UL — HIGH (ref 3.8–10.5)
WBC # BLD: 11.47 K/UL — HIGH (ref 3.8–10.5)
WBC # BLD: 9.56 K/UL — SIGNIFICANT CHANGE UP (ref 3.8–10.5)
WBC # FLD AUTO: 10.93 K/UL — HIGH (ref 3.8–10.5)
WBC # FLD AUTO: 11.47 K/UL — HIGH (ref 3.8–10.5)
WBC # FLD AUTO: 9.56 K/UL — SIGNIFICANT CHANGE UP (ref 3.8–10.5)

## 2020-05-16 PROCEDURE — 71250 CT THORAX DX C-: CPT | Mod: 26

## 2020-05-16 PROCEDURE — 93970 EXTREMITY STUDY: CPT | Mod: 26

## 2020-05-16 PROCEDURE — 88305 TISSUE EXAM BY PATHOLOGIST: CPT | Mod: 26

## 2020-05-16 PROCEDURE — 99233 SBSQ HOSP IP/OBS HIGH 50: CPT | Mod: GC

## 2020-05-16 PROCEDURE — 88108 CYTOPATH CONCENTRATE TECH: CPT | Mod: 26

## 2020-05-16 PROCEDURE — 88108 CYTOPATH CONCENTRATE TECH: CPT | Mod: 26,59

## 2020-05-16 RX ORDER — LIDOCAINE HCL 20 MG/ML
20 VIAL (ML) INJECTION ONCE
Refills: 0 | Status: DISCONTINUED | OUTPATIENT
Start: 2020-05-16 | End: 2020-05-28

## 2020-05-16 RX ADMIN — ALBUTEROL 2 PUFF(S): 90 AEROSOL, METERED ORAL at 00:28

## 2020-05-16 RX ADMIN — ALBUTEROL 2 PUFF(S): 90 AEROSOL, METERED ORAL at 23:27

## 2020-05-16 RX ADMIN — Medication 40 MILLIEQUIVALENT(S): at 05:05

## 2020-05-16 RX ADMIN — PIPERACILLIN AND TAZOBACTAM 25 GRAM(S): 4; .5 INJECTION, POWDER, LYOPHILIZED, FOR SOLUTION INTRAVENOUS at 01:53

## 2020-05-16 RX ADMIN — ALBUTEROL 2 PUFF(S): 90 AEROSOL, METERED ORAL at 06:39

## 2020-05-16 RX ADMIN — PIPERACILLIN AND TAZOBACTAM 25 GRAM(S): 4; .5 INJECTION, POWDER, LYOPHILIZED, FOR SOLUTION INTRAVENOUS at 09:36

## 2020-05-16 RX ADMIN — ALBUTEROL 2 PUFF(S): 90 AEROSOL, METERED ORAL at 11:57

## 2020-05-16 NOTE — PROGRESS NOTE ADULT - PROBLEM SELECTOR PLAN 8
Pt with pulmonary nodules not on CT scan, could possibly be malignancy  -will obtain CT chest to further characterize  results d/w patinet Pt with pulmonary nodules not on CT scan, could possibly be malignancy  - obtain CT chest to further characterize

## 2020-05-16 NOTE — PHYSICAL THERAPY INITIAL EVALUATION ADULT - PATIENT PROFILE REVIEW, REHAB EVAL
PT orders received: no formal activity order. Consult with ANI Hernandez, patient may participate in PT evaluation, however hold functional mobility./yes

## 2020-05-16 NOTE — PROGRESS NOTE ADULT - PROBLEM SELECTOR PLAN 3
Pt with Cr 1.68, unclear baseline, suspect pre-renal SCOTT in the setting of intravascular depletion/ third spacing and hypotension   -albumin and blood as above  -patient will need diuresis when blood pressure stabilizes   -monitor Cr and avoid nephrotoxic agents/ renally dose meds  -f/u UA Pt with Cr 1.68 --> 1.5.  unclear baseline  suspect pre-renal SCOTT in the setting of intravascular depletion/third spacing and hypotension   - c/w blood transfusion  - consider diuresis when blood pressure stabilizes   - monitor Cr and avoid nephrotoxic agents/ renally dose meds  - f/u UA  - consider dx paracentesis

## 2020-05-16 NOTE — PHYSICAL THERAPY INITIAL EVALUATION ADULT - ADDITIONAL COMMENTS
Pt lives [place]  house with 3 steps to enter. One flight of stairs within to second floor bedroom. Pt reported ability to ambulate with no assistive device until about 3 days ago.     Pt was left semi-supine in bed as found, all lines/tubes intact and call bell within reach, RN aware.

## 2020-05-16 NOTE — PROGRESS NOTE ADULT - PROBLEM SELECTOR PLAN 2
Pt with hypotension to the 90s/40s.  Suspect in setting of intravascular depletion and severe anemia.   -will hold lasix until BP improves  -blood transfusion as above  -s/p albumin 25% 50ml, can give additional 50  -if persists can give midodrine and consider MICU eval Pt with hypotension to the 90s/40s.  Suspect in setting of intravascular depletion and severe anemia. Improved s/p 3u PRBC.  - hold lasix until BP improves  - c/w blood transfusion PRN  - s/p albumin 25% 50mL  - if persists can give midodrine and consider MICU eval

## 2020-05-16 NOTE — PROVIDER CONTACT NOTE (OTHER) - ACTION/TREATMENT ORDERED:
MD informed, VS repeated 101/48, HR 67, O2 100% on 1 L NC, temp 97.8, per MD listen to lungs, some crackles noted, MD will assess patient

## 2020-05-16 NOTE — PHYSICAL THERAPY INITIAL EVALUATION ADULT - PERTINENT HX OF CURRENT PROBLEM, REHAB EVAL
Pt is a 49 year old female presenting from PCP office with abnormal labs and weakness. Patient found with microcytic anemia to 3.6. Also with thrombocytopenia, leukocytosis, elevated LFTS. Also with hypotension in the setting of decreased intravascular volume. Also with SCOTT. PMH: pancreatic mass (benign as per patient)  status-post resection in 2011 (s/p whipple).

## 2020-05-16 NOTE — PROGRESS NOTE ADULT - PROBLEM SELECTOR PLAN 6
Pt with leukocytosis and elevated temp, suspect possible infection vs reactive  -will start abx (vanc and zosyn)  -f/u bcx, ucx, COVID Pt with leukocytosis and elevated temp, suspect possible infection vs reactive  - will start abx (vanc and zosyn)  - f/u bcx, ucx

## 2020-05-16 NOTE — PROGRESS NOTE ADULT - PROBLEM SELECTOR PLAN 5
Trop elevated to 85 initially, now downtrending, patient not with CP, EKG without evidence to suggest ischemic event, suspect in setting of demand supply mismatch and worsening kidney function   -monitor   -obtain cardiac echo Trop elevated to 85 initially, now downtrending, patient not with CP, EKG without evidence to suggest ischemic event, suspect in setting of demand supply mismatch and worsening kidney function   - monitor

## 2020-05-16 NOTE — PROGRESS NOTE ADULT - PROBLEM SELECTOR PLAN 4
Pt with anasarca  (significant LLE and abdominal swelling)  -in the setting of decreased oncotic pressure/ hypoalbuminemia and mild hypoproteinemia   -albumin as above  -will need diuresis when BP stable vs ?paracentesis  -cardiac echo to assess for HF component Pt with anasarca (significant LLE and abdominal swelling)  - in the setting of decreased oncotic pressure/ hypoalbuminemia and mild hypoproteinemia  - Duplex r/o DVT

## 2020-05-16 NOTE — PHYSICAL THERAPY INITIAL EVALUATION ADULT - GENERAL OBSERVATIONS, REHAB EVAL
Pt received semisupine in bed, +oxygen via nasal cannula, in no distress. Pt agreeable to participate in physical therapy evaluation.

## 2020-05-16 NOTE — PROGRESS NOTE ADULT - PROBLEM SELECTOR PLAN 7
Likely in setting of hepatic congestion, ascites demonstrated on CT but no evidence of cirrhosis   -f/u Hep panel  -monitor Likely in setting of hepatic congestion, ascites demonstrated on CT but no evidence of cirrhosis   - f/u Hep panel  - monitor

## 2020-05-16 NOTE — PROGRESS NOTE ADULT - ASSESSMENT
49 year-old female with history of pancreatic mass (benign as per patient)  status-post resection in 2011 (s/p whipple) who presented to the ED sent in from her PCP office due to abnormal labs and weakness. Patient found with microcytic anemia to 3.6. Also with thrombocytopenia, leukocytosis, elevated LFTS. Also with hypotension in the setting of decreased intravascular volume. Also with SCOTT. 49 year-old female with history of pancreatic mass (benign as per patient) s/p Whipple in 2011 who presented to the ED sent in from her PCP office due to abnormal labs and weakness. Patient found with microcytic anemia to 3.6. Also with thrombocytopenia, leukocytosis, elevated LFTS. Also with hypotension in the setting of decreased intravascular volume. Also with SCOTT.

## 2020-05-16 NOTE — PROVIDER CONTACT NOTE (OTHER) - ASSESSMENT
VS stable, patient complaining of shortness of breath, albuterol administered and 1L nasal cannula applied, patient no longer complaining of shortness of breath. Generalized pitting edema

## 2020-05-16 NOTE — PROGRESS NOTE ADULT - SUBJECTIVE AND OBJECTIVE BOX
Authored by Gus Bustamante MD  Please page medicine Team 3    ----------------------------------------------------------------  Patient is a 49y old  Female who presents with a chief complaint of Anemia (15 May 2020 18:14)      SUBJECTIVE / OVERNIGHT EVENTS:  INCOMPLETE NOTE    ADDITIONAL REVIEW OF SYSTEMS:    MEDICATIONS  (STANDING):  piperacillin/tazobactam IVPB.. 3.375 Gram(s) IV Intermittent every 8 hours  vancomycin  IVPB 1000 milliGRAM(s) IV Intermittent every 24 hours    MEDICATIONS  (PRN):  ALBUTerol    90 MICROgram(s) HFA Inhaler 2 Puff(s) Inhalation every 6 hours PRN Shortness of Breath and/or Wheezing      CAPILLARY BLOOD GLUCOSE        I&O's Summary    15 May 2020 07:01  -  16 May 2020 07:00  --------------------------------------------------------  IN: 1200 mL / OUT: 700 mL / NET: 500 mL        PHYSICAL EXAM:  Vital Signs Last 24 Hrs  T(C): 36.6 (16 May 2020 07:06), Max: 37.8 (15 May 2020 13:50)  T(F): 97.9 (16 May 2020 07:06), Max: 100.1 (15 May 2020 13:50)  HR: 62 (16 May 2020 07:06) (62 - 88)  BP: 105/65 (16 May 2020 07:06) (88/44 - 115/60)  BP(mean): 59 (15 May 2020 17:50) (59 - 59)  RR: 17 (16 May 2020 07:06) (17 - 24)  SpO2: 100% (16 May 2020 07:06) (96% - 100%)  CONSTITUTIONAL: NAD  EYES: conjunctiva and sclera clear  ENMT: Moist oral mucosa, no pharyngeal injection or exudates; normal dentition  NECK: Supple, no JVD, no palpable masses  RESPIRATORY: Normal respiratory effort; lungs are clear to auscultation bilaterally  CARDIOVASCULAR: Regular rate and rhythm, normal S1 and S2, no murmur/rub/gallop; No lower extremity edema; Peripheral pulses are 2+ bilaterally  ABDOMEN: Nontender to palpation, normoactive bowel sounds, no rebound/guarding; No hepatosplenomegaly  MUSCULOSKELETAL:  Normal gait; no clubbing or cyanosis of digits; no joint swelling or tenderness to palpation  PSYCH: A&Ox3; affect appropriate  NEUROLOGY: CN 2-12 are intact and symmetric; no focal deficits   SKIN: No rashes; no palpable lesions    LABS:                        7.1    9.56  )-----------( x        ( 16 May 2020 07:15 )             23.7     05-15    137  |  110<H>  |  24<H>  ----------------------------<  100<H>  3.1<L>   |  20<L>  |  1.65<H>    Ca    7.0<L>      15 May 2020 15:50  Phos  3.0     -15  Mg     2.1     -15    TPro  4.9<L>  /  Alb  1.7<L>  /  TBili  1.5<H>  /  DBili  x   /  AST  149<H>  /  ALT  76<H>  /  AlkPhos  117  05-15    PT/INR - ( 15 May 2020 13:53 )   PT: 20.7 SEC;   INR: 1.78          PTT - ( 15 May 2020 13:53 )  PTT:39.7 SEC      Urinalysis Basic - ( 15 May 2020 22:00 )    Color: YELLOW / Appearance: CLEAR / S.017 / pH: 6.0  Gluc: NEGATIVE / Ketone: NEGATIVE  / Bili: NEGATIVE / Urobili: NORMAL   Blood: NEGATIVE / Protein: 10 / Nitrite: NEGATIVE   Leuk Esterase: NEGATIVE / RBC: x / WBC x   Sq Epi: x / Non Sq Epi: x / Bacteria: x          RADIOLOGY & ADDITIONAL TESTS:  Results Reviewed:   Imaging Personally Reviewed:  Electrocardiogram Personally Reviewed:    COORDINATION OF CARE:  Care Discussed with Consultants/Other Providers [Y/N]:  Prior or Outpatient Records Reviewed [Y/N]: Authored by Gus Bustamante MD  Please page medicine Team 3    ----------------------------------------------------------------  Patient is a 49y old  Female who presents with a chief complaint of Anemia (15 May 2020 18:14)      SUBJECTIVE / OVERNIGHT EVENTS:  No events o/n. No SOB. Pt satting 100% on RA. Pt endorses some weakness. No cough. No fevers/chills. No headache, chest pain, abd pain, or leg pain. No nausea/vomiting. No diarrhea or constipation.    ADDITIONAL REVIEW OF SYSTEMS:    MEDICATIONS  (STANDING):  piperacillin/tazobactam IVPB.. 3.375 Gram(s) IV Intermittent every 8 hours  vancomycin  IVPB 1000 milliGRAM(s) IV Intermittent every 24 hours    MEDICATIONS  (PRN):  ALBUTerol    90 MICROgram(s) HFA Inhaler 2 Puff(s) Inhalation every 6 hours PRN Shortness of Breath and/or Wheezing      CAPILLARY BLOOD GLUCOSE        I&O's Summary    15 May 2020 07:01  -  16 May 2020 07:00  --------------------------------------------------------  IN: 1200 mL / OUT: 700 mL / NET: 500 mL        PHYSICAL EXAM:  Vital Signs Last 24 Hrs  T(C): 36.6 (16 May 2020 07:06), Max: 37.8 (15 May 2020 13:50)  T(F): 97.9 (16 May 2020 07:06), Max: 100.1 (15 May 2020 13:50)  HR: 62 (16 May 2020 07:06) (62 - 88)  BP: 105/65 (16 May 2020 07:06) (88/44 - 115/60)  BP(mean): 59 (15 May 2020 17:50) (59 - 59)  RR: 17 (16 May 2020 07:06) (17 - 24)  SpO2: 100% (16 May 2020 07:06) (96% - 100%)  CONSTITUTIONAL: NAD  EYES: conjunctiva and sclera clear  ENMT: Moist oral mucosa, no pharyngeal injection or exudates; normal dentition  NECK: Supple, no JVD, no palpable masses  RESPIRATORY: Normal respiratory effort; lungs are clear to auscultation bilaterally  CARDIOVASCULAR: Regular rate and rhythm, normal S1 and S2, no murmur/rub/gallop; No lower extremity edema; Peripheral pulses are 2+ bilaterally  ABDOMEN: Nontender to palpation, normoactive bowel sounds, no rebound/guarding; No hepatosplenomegaly  MUSCULOSKELETAL:  Normal gait; no clubbing or cyanosis of digits; no joint swelling or tenderness to palpation  PSYCH: A&Ox3; affect appropriate  NEUROLOGY: CN 2-12 are intact and symmetric; no focal deficits   SKIN: No rashes; no palpable lesions    LABS:                        7.1    9.56  )-----------( x        ( 16 May 2020 07:15 )             23.7     05-15    137  |  110<H>  |  24<H>  ----------------------------<  100<H>  3.1<L>   |  20<L>  |  1.65<H>    Ca    7.0<L>      15 May 2020 15:50  Phos  3.0     05-15  Mg     2.1     05-15    TPro  4.9<L>  /  Alb  1.7<L>  /  TBili  1.5<H>  /  DBili  x   /  AST  149<H>  /  ALT  76<H>  /  AlkPhos  117  05-15    PT/INR - ( 15 May 2020 13:53 )   PT: 20.7 SEC;   INR: 1.78          PTT - ( 15 May 2020 13:53 )  PTT:39.7 SEC      Urinalysis Basic - ( 15 May 2020 22:00 )    Color: YELLOW / Appearance: CLEAR / S.017 / pH: 6.0  Gluc: NEGATIVE / Ketone: NEGATIVE  / Bili: NEGATIVE / Urobili: NORMAL   Blood: NEGATIVE / Protein: 10 / Nitrite: NEGATIVE   Leuk Esterase: NEGATIVE / RBC: x / WBC x   Sq Epi: x / Non Sq Epi: x / Bacteria: x          RADIOLOGY & ADDITIONAL TESTS:  Results Reviewed:   Imaging Personally Reviewed:  Electrocardiogram Personally Reviewed:    COORDINATION OF CARE:  Care Discussed with Consultants/Other Providers [Y/N]:  Prior or Outpatient Records Reviewed [Y/N]: Authored by Gus Bustamante MD  Please page medicine Team 3    ----------------------------------------------------------------  Patient is a 49y old  Female who presents with a chief complaint of Anemia (15 May 2020 18:14)      SUBJECTIVE / OVERNIGHT EVENTS:  No events o/n. No SOB. Pt satting 100% on RA. Pt endorses continued weakness. No cough. No fevers/chills. No headache, chest pain, abd pain, or leg pain. No nausea/vomiting. No diarrhea or constipation.    ADDITIONAL REVIEW OF SYSTEMS:    MEDICATIONS  (STANDING):  piperacillin/tazobactam IVPB.. 3.375 Gram(s) IV Intermittent every 8 hours  vancomycin  IVPB 1000 milliGRAM(s) IV Intermittent every 24 hours    MEDICATIONS  (PRN):  ALBUTerol    90 MICROgram(s) HFA Inhaler 2 Puff(s) Inhalation every 6 hours PRN Shortness of Breath and/or Wheezing      CAPILLARY BLOOD GLUCOSE        I&O's Summary    15 May 2020 07:01  -  16 May 2020 07:00  --------------------------------------------------------  IN: 1200 mL / OUT: 700 mL / NET: 500 mL        PHYSICAL EXAM:  Vital Signs Last 24 Hrs  T(C): 36.6 (16 May 2020 07:06), Max: 37.8 (15 May 2020 13:50)  T(F): 97.9 (16 May 2020 07:06), Max: 100.1 (15 May 2020 13:50)  HR: 62 (16 May 2020 07:06) (62 - 88)  BP: 105/65 (16 May 2020 07:06) (88/44 - 115/60)  BP(mean): 59 (15 May 2020 17:50) (59 - 59)  RR: 17 (16 May 2020 07:06) (17 - 24)  SpO2: 100% (16 May 2020 07:06) (96% - 100%)  CONSTITUTIONAL: NAD  EYES: conjunctiva and sclera clear  ENMT: Moist oral mucosa, no pharyngeal injection or exudates; normal dentition  NECK: Supple, no JVD, no palpable masses  RESPIRATORY: Normal respiratory effort; lungs are clear to auscultation bilaterally  CARDIOVASCULAR: Regular rate and rhythm, normal S1 and S2, no murmur/rub/gallop; No lower extremity edema; Peripheral pulses are 2+ bilaterally  ABDOMEN: Nontender to palpation, normoactive bowel sounds, no rebound/guarding; No hepatosplenomegaly  MUSCULOSKELETAL:  Normal gait; no clubbing or cyanosis of digits; no joint swelling or tenderness to palpation  PSYCH: A&Ox3; affect appropriate  NEUROLOGY: CN 2-12 are intact and symmetric; no focal deficits   SKIN: No rashes; no palpable lesions    LABS:                        7.1    9.56  )-----------( x        ( 16 May 2020 07:15 )             23.7     05-15    137  |  110<H>  |  24<H>  ----------------------------<  100<H>  3.1<L>   |  20<L>  |  1.65<H>    Ca    7.0<L>      15 May 2020 15:50  Phos  3.0     05-15  Mg     2.1     05-15    TPro  4.9<L>  /  Alb  1.7<L>  /  TBili  1.5<H>  /  DBili  x   /  AST  149<H>  /  ALT  76<H>  /  AlkPhos  117  05-15    PT/INR - ( 15 May 2020 13:53 )   PT: 20.7 SEC;   INR: 1.78          PTT - ( 15 May 2020 13:53 )  PTT:39.7 SEC      Urinalysis Basic - ( 15 May 2020 22:00 )    Color: YELLOW / Appearance: CLEAR / S.017 / pH: 6.0  Gluc: NEGATIVE / Ketone: NEGATIVE  / Bili: NEGATIVE / Urobili: NORMAL   Blood: NEGATIVE / Protein: 10 / Nitrite: NEGATIVE   Leuk Esterase: NEGATIVE / RBC: x / WBC x   Sq Epi: x / Non Sq Epi: x / Bacteria: x          RADIOLOGY & ADDITIONAL TESTS:  Results Reviewed:   Imaging Personally Reviewed:  Electrocardiogram Personally Reviewed:    COORDINATION OF CARE:  Care Discussed with Consultants/Other Providers [Y/N]:  Prior or Outpatient Records Reviewed [Y/N]: Authored by Gus Bustamante MD  Please page medicine Team 3    ----------------------------------------------------------------  Patient is a 49y old  Female who presents with a chief complaint of Anemia (15 May 2020 18:14)      SUBJECTIVE / OVERNIGHT EVENTS:  No events o/n. No SOB. Pt satting 100% on RA. Pt endorses continued weakness and difficulty walking due to leg swelling. No fevers/chills. No headache, chest pain, abd pain, or leg pain. No nausea/vomiting. No diarrhea or constipation. Normal BM every day. Normal appetite. States abd swelling began about 2 months ago. Leg swelling has gotten worse and limited mobility over past week. Reports no blood in urine, stool, nose, or any other bleeding.     ADDITIONAL REVIEW OF SYSTEMS:  Negative except for HPI.    MEDICATIONS  (STANDING):  piperacillin/tazobactam IVPB.. 3.375 Gram(s) IV Intermittent every 8 hours  vancomycin  IVPB 1000 milliGRAM(s) IV Intermittent every 24 hours    MEDICATIONS  (PRN):  ALBUTerol    90 MICROgram(s) HFA Inhaler 2 Puff(s) Inhalation every 6 hours PRN Shortness of Breath and/or Wheezing      CAPILLARY BLOOD GLUCOSE        I&O's Summary    15 May 2020 07:01  -  16 May 2020 07:00  --------------------------------------------------------  IN: 1200 mL / OUT: 700 mL / NET: 500 mL        PHYSICAL EXAM:  Vital Signs Last 24 Hrs  T(C): 36.6 (16 May 2020 07:06), Max: 37.8 (15 May 2020 13:50)  T(F): 97.9 (16 May 2020 07:06), Max: 100.1 (15 May 2020 13:50)  HR: 62 (16 May 2020 07:06) (62 - 88)  BP: 105/65 (16 May 2020 07:06) (88/44 - 115/60)  BP(mean): 59 (15 May 2020 17:50) (59 - 59)  RR: 17 (16 May 2020 07:06) (17 - 24)  SpO2: 100% (16 May 2020 07:06) (96% - 100%)    CONSTITUTIONAL: NAD, sitting upright in bed eating breakfast  EYES: mild scleral icterus, pale conjunctiva  ENMT: Moist oral mucosa  NECK: Supple, no JVD, no palpable masses  RESPIRATORY: Normal respiratory effort; lungs are clear to auscultation bilaterally  CARDIOVASCULAR: Regular rate and rhythm, no murmur/rub/gallop; 3+ pitting edema up to knee  ABDOMEN: Nontender to palpation,+ascites  MUSCULOSKELETAL: swelling in knees and wrists  PSYCH: A&Ox3; affect appropriate  NEUROLOGY: no focal deficits   SKIN: No rashes; no palpable lesions    LABS:                        7.1    9.56  )-----------( x        ( 16 May 2020 07:15 )             23.7     05-15    137  |  110<H>  |  24<H>  ----------------------------<  100<H>  3.1<L>   |  20<L>  |  1.65<H>    Ca    7.0<L>      15 May 2020 15:50  Phos  3.0     05-15  Mg     2.1     05-15    TPro  4.9<L>  /  Alb  1.7<L>  /  TBili  1.5<H>  /  DBili  x   /  AST  149<H>  /  ALT  76<H>  /  AlkPhos  117  05-15    PT/INR - ( 15 May 2020 13:53 )   PT: 20.7 SEC;   INR: 1.78          PTT - ( 15 May 2020 13:53 )  PTT:39.7 SEC      Urinalysis Basic - ( 15 May 2020 22:00 )    Color: YELLOW / Appearance: CLEAR / S.017 / pH: 6.0  Gluc: NEGATIVE / Ketone: NEGATIVE  / Bili: NEGATIVE / Urobili: NORMAL   Blood: NEGATIVE / Protein: 10 / Nitrite: NEGATIVE   Leuk Esterase: NEGATIVE / RBC: x / WBC x   Sq Epi: x / Non Sq Epi: x / Bacteria: x      < from: CT Abdomen and Pelvis No Cont (05.15.20 @ 15:35) >    EXAM:  CT ABDOMEN AND PELVIS        PROCEDURE DATE:  May 15 2020         INTERPRETATION:  CLINICAL INFORMATION: 49-year-old female with history of resection pancreatic mass 2011. Increasing weakness and swelling of the lower extremities and abdomen    COMPARISON: None.    PROCEDURE:   CT of the Abdomen and Pelvis was performed without intravenous contrast.   Intravenous contrast: None.  Oral contrast: None.  Sagittal and coronal reformats were performed.    FINDINGS:    LOWER CHEST: Mild cardiomegaly. Small bilateral pleural effusions. Bibasilar atelectasis. Scattered pulmonary nodules, for instance measuring 6 mm in the lingula (2:1) and 4 mm right middle lobe (2:10)    LIVER: Within normal limits.  BILE DUCTS: Normal caliber.  GALLBLADDER: Cholecystectomy.  SPLEEN: Within normal limits.  PANCREAS: Probable Whipple procedure  ADRENALS: Poorly visualized.  KIDNEYS/URETERS: Within normal limits.    BLADDER: Within normal limits.  REPRODUCTIVE ORGANS: Uterus and adnexa within normal limits    BOWEL: No bowel obstruction. Appendix not visualized.  PERITONEUM: Gross ascites.  VESSELS: Within normal limits.  RETROPERITONEUM/LYMPH NODES: No lymphadenopathy.    ABDOMINAL WALL: Marked anasarca  BONES: Degenerative change.    IMPRESSION:     Very limited noncontrast examination. Suggest repeat examination with contrast and premedication if appropriate.  Gross ascites.  Marked anasarca.  Mild cardiomegaly.  Several pulmonary nodules. Consider full chest CT.  Probable Whipple procedure.                          BRENDON BUSTOS M.D., ATTENDING RADIOLOGIST  This document has been electronically signed. May 15 2020  3:55PM                  < end of copied text >

## 2020-05-16 NOTE — PROGRESS NOTE ADULT - PROBLEM SELECTOR PLAN 1
Pt with anemia to 3.8 and chronic fatigue. Microcytic. Suspect iron deficiency vs anemia of chronic illness. Low suspicion for GI Bleed. Concerning for malignancy given she had pancreatic mass (unclear hx). Also consider hemolysis  -f/u iron studies, LDH, haptoglobin, retic count  -transfusing 2 U PRBC, Follow up post transfusion CBC  -monitor HgB and transfuse if <7  -a/w generalized weakness; PT consult Pt with anemia to 3.8 and chronic fatigue. Microcytic.   Suspect iron deficiency vs anemia of chronic illness. Low suspicion for GI Bleed.  Concerning for malignancy given she had pancreatic mass (unclear hx).  - f/u iron studies, LDH, haptoglobin, retic count  - Bili 2.6  - Hgb 7.1 s/p 3 uPRBC, will transfuse 1 more  - monitor HgB and transfuse if <7  - PT consult Pt with anemia to 3.8 and chronic fatigue. Microcytic.   Suspect iron deficiency vs anemia of chronic illness. Low suspicion for GI Bleed.  Concerning for malignancy given she had pancreatic mass (unclear hx).  Hapto 61, , low suspicion for hemolysis.  - f/u iron studies, retic count  - Bili 2.6  - Hgb 7.1 s/p 3 uPRBC, will transfuse 1 more  - monitor HgB and transfuse if <7  - PT consult

## 2020-05-17 LAB
HCT VFR BLD CALC: 29.7 % — LOW (ref 34.5–45)
HGB BLD-MCNC: 9 G/DL — LOW (ref 11.5–15.5)
MCHC RBC-ENTMCNC: 23.8 PG — LOW (ref 27–34)
MCHC RBC-ENTMCNC: 30.3 % — LOW (ref 32–36)
MCV RBC AUTO: 78.6 FL — LOW (ref 80–100)
NRBC # FLD: 0.02 K/UL — SIGNIFICANT CHANGE UP (ref 0–0)
PLATELET # BLD AUTO: 90 K/UL — LOW (ref 150–400)
PMV BLD: SIGNIFICANT CHANGE UP FL (ref 7–13)
RBC # BLD: 3.78 M/UL — LOW (ref 3.8–5.2)
RBC # FLD: 21.9 % — HIGH (ref 10.3–14.5)
WBC # BLD: 8.2 K/UL — SIGNIFICANT CHANGE UP (ref 3.8–10.5)
WBC # FLD AUTO: 8.2 K/UL — SIGNIFICANT CHANGE UP (ref 3.8–10.5)

## 2020-05-17 PROCEDURE — 74177 CT ABD & PELVIS W/CONTRAST: CPT | Mod: 26

## 2020-05-17 PROCEDURE — 99233 SBSQ HOSP IP/OBS HIGH 50: CPT

## 2020-05-17 PROCEDURE — 71260 CT THORAX DX C+: CPT | Mod: 26

## 2020-05-17 RX ORDER — DIPHENHYDRAMINE HCL 50 MG
50 CAPSULE ORAL ONCE
Refills: 0 | Status: COMPLETED | OUTPATIENT
Start: 2020-05-17 | End: 2020-05-17

## 2020-05-17 RX ORDER — BENZOCAINE AND MENTHOL 5; 1 G/100ML; G/100ML
1 LIQUID ORAL EVERY 6 HOURS
Refills: 0 | Status: DISCONTINUED | OUTPATIENT
Start: 2020-05-17 | End: 2020-05-28

## 2020-05-17 RX ADMIN — BENZOCAINE AND MENTHOL 1 LOZENGE: 5; 1 LIQUID ORAL at 14:37

## 2020-05-17 RX ADMIN — ALBUTEROL 2 PUFF(S): 90 AEROSOL, METERED ORAL at 14:37

## 2020-05-17 RX ADMIN — Medication 50 MILLIGRAM(S): at 17:07

## 2020-05-17 RX ADMIN — BENZOCAINE AND MENTHOL 1 LOZENGE: 5; 1 LIQUID ORAL at 03:59

## 2020-05-17 RX ADMIN — Medication 40 MILLIGRAM(S): at 14:51

## 2020-05-17 NOTE — PROGRESS NOTE ADULT - SUBJECTIVE AND OBJECTIVE BOX
Authored by Gus Bustamante MD  Please page medicine Team 3    ----------------------------------------------------------------  Patient is a 49y old  Female who presents with a chief complaint of Anemia (16 May 2020 08:06)      SUBJECTIVE / OVERNIGHT EVENTS:  INCOMPLETE NOTE    ADDITIONAL REVIEW OF SYSTEMS:    MEDICATIONS  (STANDING):  lidocaine 1% Injectable 20 milliLiter(s) Local Injection once    MEDICATIONS  (PRN):  ALBUTerol    90 MICROgram(s) HFA Inhaler 2 Puff(s) Inhalation every 6 hours PRN Shortness of Breath and/or Wheezing  benzocaine 15 mG/menthol 3.6 mG (Sugar-Free) Lozenge 1 Lozenge Oral every 6 hours PRN Sore Throat      CAPILLARY BLOOD GLUCOSE        I&O's Summary    16 May 2020 07:01  -  17 May 2020 07:00  --------------------------------------------------------  IN: 890 mL / OUT: 1600 mL / NET: -710 mL        PHYSICAL EXAM:  Vital Signs Last 24 Hrs  T(C): 36.5 (17 May 2020 06:11), Max: 36.9 (16 May 2020 17:25)  T(F): 97.7 (17 May 2020 06:11), Max: 98.5 (16 May 2020 17:25)  HR: 60 (17 May 2020 06:11) (60 - 69)  BP: 117/72 (17 May 2020 06:11) (111/53 - 132/79)  BP(mean): --  RR: 18 (17 May 2020 06:11) (18 - 19)  SpO2: 99% (17 May 2020 06:11) (98% - 100%)  CONSTITUTIONAL: NAD  EYES: conjunctiva and sclera clear  ENMT: Moist oral mucosa, no pharyngeal injection or exudates; normal dentition  NECK: Supple, no JVD, no palpable masses  RESPIRATORY: Normal respiratory effort; lungs are clear to auscultation bilaterally  CARDIOVASCULAR: Regular rate and rhythm, normal S1 and S2, no murmur/rub/gallop; No lower extremity edema; Peripheral pulses are 2+ bilaterally  ABDOMEN: Nontender to palpation, normoactive bowel sounds, no rebound/guarding; No hepatosplenomegaly  MUSCULOSKELETAL:  Normal gait; no clubbing or cyanosis of digits; no joint swelling or tenderness to palpation  PSYCH: A&Ox3; affect appropriate  NEUROLOGY: CN 2-12 are intact and symmetric; no focal deficits   SKIN: No rashes; no palpable lesions    LABS:                        8.6    7.84  )-----------( 84       ( 17 May 2020 06:05 )             28.2     05-17    141  |  112<H>  |  18  ----------------------------<  78  4.0   |  21<L>  |  1.24    Ca    7.3<L>      17 May 2020 06:05  Phos  2.6     05-17  Mg     2.1     05-17    TPro  4.9<L>  /  Alb  1.7<L>  /  TBili  3.2<H>  /  DBili  x   /  AST  63<H>  /  ALT  62<H>  /  AlkPhos  119  05-17    PT/INR - ( 15 May 2020 13:53 )   PT: 20.7 SEC;   INR: 1.78          PTT - ( 15 May 2020 13:53 )  PTT:39.7 SEC      Urinalysis Basic - ( 15 May 2020 22:00 )    Color: YELLOW / Appearance: CLEAR / S.017 / pH: 6.0  Gluc: NEGATIVE / Ketone: NEGATIVE  / Bili: NEGATIVE / Urobili: NORMAL   Blood: NEGATIVE / Protein: 10 / Nitrite: NEGATIVE   Leuk Esterase: NEGATIVE / RBC: x / WBC x   Sq Epi: x / Non Sq Epi: x / Bacteria: x        Culture - Body Fluid with Gram Stain (collected 16 May 2020 18:20)  Source: .Body Fluid Peritoneal Fluid  Gram Stain (16 May 2020 21:51):    polymorphonuclear leukocytes seen    No organisms seen    by cytocentrifuge    Culture - Blood (collected 15 May 2020 16:24)  Source: .Blood Blood-Venous  Preliminary Report (16 May 2020 17:01):    No growth to date.    Culture - Blood (collected 15 May 2020 16:23)  Source: .Blood Blood-Peripheral  Preliminary Report (16 May 2020 17:01):    No growth to date.        RADIOLOGY & ADDITIONAL TESTS:  Results Reviewed:   Imaging Personally Reviewed:  Electrocardiogram Personally Reviewed:    COORDINATION OF CARE:  Care Discussed with Consultants/Other Providers [Y/N]:  Prior or Outpatient Records Reviewed [Y/N]: Authored by Gus Bustamante MD  Please page medicine Team 3    ----------------------------------------------------------------  Patient is a 49y old  Female who presents with a chief complaint of Anemia (16 May 2020 08:06)      SUBJECTIVE / OVERNIGHT EVENTS:  No events o/n. Very minimal SOB associated w/ some wheezing. Worse when lying on L side. Improved w/ albuterol and O2. Pt satting 99% on RA during exam. No cough. No fevers/chills. No headache, chest pain, abd pain, or leg pain. No nausea/vomiting. No diarrhea or constipation. Last BM this AM. Pt states she is able to better move her lower extremities and is able to ambulate however swelling remains. Also endorses some dry mouth. Improved weakness.    ADDITIONAL REVIEW OF SYSTEMS:  Negative except for HPI.     MEDICATIONS  (STANDING):  lidocaine 1% Injectable 20 milliLiter(s) Local Injection once    MEDICATIONS  (PRN):  ALBUTerol    90 MICROgram(s) HFA Inhaler 2 Puff(s) Inhalation every 6 hours PRN Shortness of Breath and/or Wheezing  benzocaine 15 mG/menthol 3.6 mG (Sugar-Free) Lozenge 1 Lozenge Oral every 6 hours PRN Sore Throat      CAPILLARY BLOOD GLUCOSE        I&O's Summary    16 May 2020 07:01  -  17 May 2020 07:00  --------------------------------------------------------  IN: 890 mL / OUT: 1600 mL / NET: -710 mL        PHYSICAL EXAM:  Vital Signs Last 24 Hrs  T(C): 36.5 (17 May 2020 06:11), Max: 36.9 (16 May 2020 17:25)  T(F): 97.7 (17 May 2020 06:11), Max: 98.5 (16 May 2020 17:25)  HR: 60 (17 May 2020 06:11) (60 - 69)  BP: 117/72 (17 May 2020 06:11) (111/53 - 132/79)  RR: 18 (17 May 2020 06:11) (18 - 19)  SpO2: 99% (17 May 2020 06:11) (98% - 100%)    CONSTITUTIONAL: NAD, lying supine in bed  EYES: mild scleral icterus  ENMT: Moist oral mucosa  NECK: Supple, no JVD, no palpable masses  RESPIRATORY: Normal respiratory effort; lungs are clear to auscultation bilaterally  CARDIOVASCULAR: Regular rate and rhythm, no murmur/rub/gallop; Peripheral pulses are 2+ bilaterally, 3+ pitting edema to knee b/l  ABDOMEN: Nontender to palpation, +ascites, no pain or discharge at paracentesis site  MUSCULOSKELETAL: +leg swelling  PSYCH: A&Ox3; affect appropriate  NEUROLOGY: no focal deficits   SKIN: No rashes; no palpable lesions    LABS:                        8.6    7.84  )-----------( 84       ( 17 May 2020 06:05 )             28.2     05-17    141  |  112<H>  |  18  ----------------------------<  78  4.0   |  21<L>  |  1.24    Ca    7.3<L>      17 May 2020 06:05  Phos  2.6     05-  Mg     2.1     05-17    TPro  4.9<L>  /  Alb  1.7<L>  /  TBili  3.2<H>  /  DBili  x   /  AST  63<H>  /  ALT  62<H>  /  AlkPhos  119  05-17    PT/INR - ( 15 May 2020 13:53 )   PT: 20.7 SEC;   INR: 1.78          PTT - ( 15 May 2020 13:53 )  PTT:39.7 SEC      Urinalysis Basic - ( 15 May 2020 22:00 )    Color: YELLOW / Appearance: CLEAR / S.017 / pH: 6.0  Gluc: NEGATIVE / Ketone: NEGATIVE  / Bili: NEGATIVE / Urobili: NORMAL   Blood: NEGATIVE / Protein: 10 / Nitrite: NEGATIVE   Leuk Esterase: NEGATIVE / RBC: x / WBC x   Sq Epi: x / Non Sq Epi: x / Bacteria: x        Culture - Body Fluid with Gram Stain (collected 16 May 2020 18:20)  Source: .Body Fluid Peritoneal Fluid  Gram Stain (16 May 2020 21:51):    polymorphonuclear leukocytes seen    No organisms seen    by cytocentrifuge    Culture - Blood (collected 15 May 2020 16:24)  Source: .Blood Blood-Venous  Preliminary Report (16 May 2020 17:01):    No growth to date.    Culture - Blood (collected 15 May 2020 16:23)  Source: .Blood Blood-Peripheral  Preliminary Report (16 May 2020 17:01):    No growth to date.      < from: CT Chest No Cont (20 @ 10:43) >    EXAM:  CT CHEST        PROCEDURE DATE:  May 16 2020         INTERPRETATION:  CLINICAL INFORMATION: History of pancreatic mass status post Whipple in . Pulmonary nodule seen on recent CT abdomen pelvis.     COMPARISON: CT abdomen pelvis from 5/15/2020.    PROCEDURE:   CT of the Chest was performed without intravenous contrast.  Sagittal and coronal reformats were performed.      FINDINGS:    LUNGS AND AIRWAYS: Patent central airways.  Bilateral nodular, groundglass, and consolidative opacities.  PLEURA: Moderate bilateral pleural effusions.    MEDIASTINUM AND MICHELLE: No lymphadenopathy.    VESSELS: Within normal limits.    HEART: Low-density cardiac blood pool compatible with anemia. Mild cardiomegaly. No pericardial effusion.    CHEST WALL AND LOWER NECK: Severe anasarca.    VISUALIZED UPPER ABDOMEN: Large volume ascites. Multiple surgical clips, status post Whipple.    BONES: Within normal limits.    IMPRESSION:     Bilateral nodular, groundglass, and consolidative opacities. Findings are nonspecific, the differential diagnosis includes metastatic disease and infection, atypical appearance of viral pneumonia including Covid 19 is not excluded (C19V-2).    Moderate bilateral pleural effusions and anasarca.                  AMAN WATTS, RADIOLOGY RESIDENT  This document has been electronically signed.  BRENDON BUSTOS M.D., ATTENDING RADIOLOGIST  This document has been electronically signed. May 16 2020 11:55AM                  < end of copied text >

## 2020-05-17 NOTE — PROGRESS NOTE ADULT - PROBLEM SELECTOR PLAN 6
Pt with leukocytosis and elevated temp, suspect possible infection vs reactive  - will start abx (vanc and zosyn)  - f/u bcx, ucx Pt with leukocytosis and elevated temp, suspect possible infection vs reactive  - abx d/c. leukocytosis resolved  - BCx/UCx NGTD  - no SBP per dx paracentesis

## 2020-05-17 NOTE — PROGRESS NOTE ADULT - PROBLEM SELECTOR PLAN 1
Pt with anemia to 3.8 and chronic fatigue. Microcytic.   Suspect iron deficiency vs anemia of chronic illness. Low suspicion for GI Bleed.  Concerning for malignancy given she had pancreatic mass (unclear hx).  Hapto 61, , low suspicion for hemolysis.  - f/u iron studies, retic count  - Bili 2.6  - Hgb 7.1 s/p 3 uPRBC, will transfuse 1 more  - monitor HgB and transfuse if <7  - PT consult Pt presented w/ anemia to 3.8 and chronic fatigue. Microcytic. Now improving s/p 4u PRBCs.  Suspect iron deficiency vs anemia of chronic illness. Low suspicion for GI Bleed.  Concerning for malignancy given she had pancreatic mass (unclear hx).  Hapto 61, , low suspicion for hemolysis.  - f/u iron studies, retic count  - Bili 3.3 (1.5 direct, 1.8 indirect)  - Hgb 7.1 s/p 3 uPRBC, will transfuse 1 more  - monitor HgB and transfuse if <7

## 2020-05-17 NOTE — PROGRESS NOTE ADULT - PROBLEM SELECTOR PLAN 4
Pt with anasarca (significant LLE and abdominal swelling)  - in the setting of decreased oncotic pressure/ hypoalbuminemia and mild hypoproteinemia  - Duplex r/o DVT Pt with anasarca (significant LLE and abdominal swelling)  - in the setting of decreased oncotic pressure/ hypoalbuminemia and mild hypoproteinemia  - Duplex w/ no DVT  - f/u dx paracentesis  - will obtain CT abd IV contrast

## 2020-05-17 NOTE — PROGRESS NOTE ADULT - PROBLEM SELECTOR PLAN 8
Pt with pulmonary nodules not on CT scan, could possibly be malignancy  - obtain CT chest to further characterize Pt with pulmonary nodules not on CT scan, could possibly be malignancy  - CT chest w/ pleural effusion and spots suspicious for mets  - obtain CT chest IV contrast

## 2020-05-17 NOTE — PROGRESS NOTE ADULT - ASSESSMENT
49 year-old female with history of pancreatic mass (benign as per patient) s/p Whipple in 2011 who presented to the ED sent in from her PCP office due to abnormal labs and weakness. Patient found with microcytic anemia to 3.6. Also with thrombocytopenia, leukocytosis, elevated LFTS. Also with hypotension in the setting of decreased intravascular volume. Also with SCOTT. 49 year-old female with history of pancreatic mass (benign as per patient) s/p Whipple in 2011 who presented to the ED sent in from her PCP office due to abnormal labs and weakness. Patient found with microcytic anemia to 3.6. Also with thrombocytopenia, leukocytosis, elevated LFTS. Also with hypotension in the setting of decreased intravascular volume. Also with SCOTT. SCOTT improving. Hgb improving w/ improved weakness. s/p dx paracentesis.

## 2020-05-17 NOTE — PROGRESS NOTE ADULT - PROBLEM SELECTOR PLAN 7
Likely in setting of hepatic congestion, ascites demonstrated on CT but no evidence of cirrhosis   - f/u Hep panel  - monitor Likely in setting of hepatic congestion, ascites demonstrated on CT but no evidence of cirrhosis. Improving. Elevated indirect bili.  - f/u Hep panel  - monitor

## 2020-05-17 NOTE — PROGRESS NOTE ADULT - PROBLEM SELECTOR PLAN 3
Pt with Cr 1.68 --> 1.5.  unclear baseline  suspect pre-renal SCOTT in the setting of intravascular depletion/third spacing and hypotension   - c/w blood transfusion  - consider diuresis when blood pressure stabilizes   - monitor Cr and avoid nephrotoxic agents/ renally dose meds  - f/u UA  - consider dx paracentesis Pt with Cr 1.68 --> 1.5 --> 12.5.  unclear baseline  suspect pre-renal SCOTT in the setting of intravascular depletion/third spacing and hypotension. Improving.   - c/w blood transfusion  - consider diuresis when blood pressure stabilizes   - monitor Cr and avoid nephrotoxic agents/ renally dose meds  - f/u UA

## 2020-05-17 NOTE — PROGRESS NOTE ADULT - PROBLEM SELECTOR PLAN 5
Trop elevated to 85 initially, now downtrending, patient not with CP, EKG without evidence to suggest ischemic event, suspect in setting of demand supply mismatch and worsening kidney function   - monitor Trop elevated to 85 initially, now downtrending, patient not with CP, EKG without evidence to suggest ischemic event, suspect in setting of demand supply mismatch and worsening kidney function   - downtrended

## 2020-05-18 LAB
BASOPHILS # BLD AUTO: 0.01 K/UL — SIGNIFICANT CHANGE UP (ref 0–0.2)
BASOPHILS NFR BLD AUTO: 0.1 % — SIGNIFICANT CHANGE UP (ref 0–2)
COMMENT - FLUIDS: SIGNIFICANT CHANGE UP
CREAT ?TM UR-MCNC: 7.4 MG/DL — SIGNIFICANT CHANGE UP
EOSINOPHIL # BLD AUTO: 0 K/UL — SIGNIFICANT CHANGE UP (ref 0–0.5)
EOSINOPHIL NFR BLD AUTO: 0 % — SIGNIFICANT CHANGE UP (ref 0–6)
HCT VFR BLD CALC: 32.2 % — LOW (ref 34.5–45)
HGB BLD-MCNC: 9.7 G/DL — LOW (ref 11.5–15.5)
IMM GRANULOCYTES NFR BLD AUTO: 0.6 % — SIGNIFICANT CHANGE UP (ref 0–1.5)
LYMPHOCYTES # BLD AUTO: 0.55 K/UL — LOW (ref 1–3.3)
LYMPHOCYTES # BLD AUTO: 6.3 % — LOW (ref 13–44)
MCHC RBC-ENTMCNC: 23.7 PG — LOW (ref 27–34)
MCHC RBC-ENTMCNC: 30.1 % — LOW (ref 32–36)
MCV RBC AUTO: 78.7 FL — LOW (ref 80–100)
MONOCYTES # BLD AUTO: 0.55 K/UL — SIGNIFICANT CHANGE UP (ref 0–0.9)
MONOCYTES NFR BLD AUTO: 6.3 % — SIGNIFICANT CHANGE UP (ref 2–14)
NEUTROPHILS # BLD AUTO: 7.55 K/UL — HIGH (ref 1.8–7.4)
NEUTROPHILS NFR BLD AUTO: 86.7 % — HIGH (ref 43–77)
NRBC # FLD: 0 K/UL — SIGNIFICANT CHANGE UP (ref 0–0)
PLATELET # BLD AUTO: 107 K/UL — LOW (ref 150–400)
PMV BLD: SIGNIFICANT CHANGE UP FL (ref 7–13)
PROT UR-MCNC: 2.8 MG/DL — SIGNIFICANT CHANGE UP
RBC # BLD: 4.09 M/UL — SIGNIFICANT CHANGE UP (ref 3.8–5.2)
RBC # FLD: 22.4 % — HIGH (ref 10.3–14.5)
WBC # BLD: 8.71 K/UL — SIGNIFICANT CHANGE UP (ref 3.8–10.5)
WBC # FLD AUTO: 8.71 K/UL — SIGNIFICANT CHANGE UP (ref 3.8–10.5)

## 2020-05-18 PROCEDURE — 99233 SBSQ HOSP IP/OBS HIGH 50: CPT | Mod: GC

## 2020-05-18 RX ORDER — FUROSEMIDE 40 MG
40 TABLET ORAL ONCE
Refills: 0 | Status: COMPLETED | OUTPATIENT
Start: 2020-05-18 | End: 2020-05-18

## 2020-05-18 RX ORDER — FERROUS SULFATE 325(65) MG
325 TABLET ORAL DAILY
Refills: 0 | Status: DISCONTINUED | OUTPATIENT
Start: 2020-05-18 | End: 2020-05-28

## 2020-05-18 RX ADMIN — BENZOCAINE AND MENTHOL 1 LOZENGE: 5; 1 LIQUID ORAL at 17:11

## 2020-05-18 RX ADMIN — Medication 40 MILLIGRAM(S): at 17:05

## 2020-05-18 RX ADMIN — Medication 325 MILLIGRAM(S): at 11:21

## 2020-05-18 NOTE — PROGRESS NOTE ADULT - PROBLEM SELECTOR PLAN 5
Trop elevated to 85 initially, now downtrending, patient not with CP, EKG without evidence to suggest ischemic event, suspect in setting of demand supply mismatch and worsening kidney function   - downtrended

## 2020-05-18 NOTE — PROGRESS NOTE ADULT - ASSESSMENT
49 year-old female with history of pancreatic mass (benign as per patient) s/p Whipple in 2011 who presented to the ED sent in from her PCP office due to abnormal labs and weakness. Patient found with microcytic anemia to 3.6. Also with thrombocytopenia, leukocytosis, elevated LFTS. Also with hypotension in the setting of decreased intravascular volume. Also with SCOTT. SCOTT improving. Hgb improving w/ improved weakness. s/p dx paracentesis.

## 2020-05-18 NOTE — PROGRESS NOTE ADULT - PROBLEM SELECTOR PLAN 4
Pt with anasarca (significant LLE and abdominal swelling)  - in the setting of decreased oncotic pressure/ hypoalbuminemia and mild hypoproteinemia  - Duplex w/ no DVT  - f/u dx paracentesis  - will obtain CT abd IV contrast Pt with anasarca (significant LLE and abdominal swelling)  - in the setting of decreased oncotic pressure/ hypoalbuminemia and mild hypoproteinemia  - Duplex w/ no DVT  - total NC <250; SAAG-4.5, LDH elevated at 124, Protein 1.6  - CT abd IV contrast- RP soft tissue density, hepatic lobe lesion  - f/u cytopath results; 1477

## 2020-05-18 NOTE — PROGRESS NOTE ADULT - PROBLEM SELECTOR PLAN 7
Likely in setting of hepatic congestion, ascites demonstrated on CT but no evidence of cirrhosis. Improving. Elevated indirect bili.  - f/u Hep panel  - monitor Likely in setting of hepatic congestion, ascites demonstrated on CT but no evidence of cirrhosis. Improving. Elevated indirect bili.  - Hep panel neg  - monitor

## 2020-05-18 NOTE — PROGRESS NOTE ADULT - PROBLEM SELECTOR PLAN 6
Pt with leukocytosis and elevated temp, suspect possible infection vs reactive  - abx d/c. leukocytosis resolved  - BCx/UCx NGTD  - no SBP per dx paracentesis Pt with leukocytosis and elevated temp, suspect possible infection vs reactive  - abx d/c; leukocytosis resolved  - BCx/UCx NGTD  - no SBP per dx paracentesis

## 2020-05-18 NOTE — PROGRESS NOTE ADULT - PROBLEM SELECTOR PLAN 3
Pt with Cr 1.68 --> 1.5 --> 12.5.  unclear baseline  suspect pre-renal SCOTT in the setting of intravascular depletion/third spacing and hypotension. Improving.   - c/w blood transfusion  - consider diuresis when blood pressure stabilizes   - monitor Cr and avoid nephrotoxic agents/ renally dose meds  - f/u UA suspect pre-renal SCOTT in the setting of intravascular depletion/third spacing and hypotension. Improving.   -Cr 1.68 --> 1.5 --> 1.24; unclear baseline  - monitor Cr and avoid nephrotoxic agents/ renally dose meds  -UA neg

## 2020-05-18 NOTE — PROGRESS NOTE ADULT - PROBLEM SELECTOR PLAN 8
Pt with pulmonary nodules not on CT scan, could possibly be malignancy  - CT chest w/ pleural effusion and spots suspicious for mets  - obtain CT chest IV contrast Pt with pulmonary nodules not on CT scan, could possibly be malignancy  - CT chest w/ pleural effusion b/l patchy opacities

## 2020-05-18 NOTE — PROGRESS NOTE ADULT - PROBLEM SELECTOR PLAN 9
DVT: SCDs  Diet: Regular   COVID: PCR pending DVT: SCDs  Diet: Regular   COVID: neg x1  -call for PT re-eval  -Call PMD for outpatient malignancy workup, MRI to evaluate best place for bx, and arrange reliable f/u

## 2020-05-18 NOTE — PROGRESS NOTE ADULT - SUBJECTIVE AND OBJECTIVE BOX
PROGRESS NOTE:     Patient is a 49y old  Female who presents with a chief complaint of Anemia (17 May 2020 07:48)      SUBJECTIVE / OVERNIGHT EVENTS:        MEDICATIONS  (STANDING):  lidocaine 1% Injectable 20 milliLiter(s) Local Injection once    MEDICATIONS  (PRN):  ALBUTerol    90 MICROgram(s) HFA Inhaler 2 Puff(s) Inhalation every 6 hours PRN Shortness of Breath and/or Wheezing  benzocaine 15 mG/menthol 3.6 mG (Sugar-Free) Lozenge 1 Lozenge Oral every 6 hours PRN Sore Throat      CAPILLARY BLOOD GLUCOSE        I&O's Summary    16 May 2020 07:01  -  17 May 2020 07:00  --------------------------------------------------------  IN: 890 mL / OUT: 1600 mL / NET: -710 mL    17 May 2020 07:01  -  18 May 2020 06:03  --------------------------------------------------------  IN: 240 mL / OUT: 1250 mL / NET: -1010 mL        PHYSICAL EXAM:  Vital Signs Last 24 Hrs  T(C): 36.7 (17 May 2020 22:09), Max: 36.9 (17 May 2020 13:38)  T(F): 98.1 (17 May 2020 22:09), Max: 98.4 (17 May 2020 13:38)  HR: 61 (17 May 2020 22:09) (60 - 76)  BP: 108/67 (17 May 2020 22:09) (108/67 - 127/77)  BP(mean): --  RR: 19 (17 May 2020 22:09) (18 - 19)  SpO2: 98% (17 May 2020 22:09) (97% - 100%)    CONSTITUTIONAL: NAD, well-developed  RESPIRATORY: Normal respiratory effort; lungs are clear to auscultation bilaterally  CARDIOVASCULAR: Regular rate and rhythm, normal S1 and S2, no murmur/rub/gallop; No lower extremity edema; Peripheral pulses are 2+ bilaterally  ABDOMEN: Nontender to palpation, normoactive bowel sounds, no rebound/guarding; No hepatosplenomegaly  MUSCLOSKELETAL: no clubbing or cyanosis of digits; no joint swelling or tenderness to palpation  NEURO: CN 2-12 grossly intact, moves all limbs spontaneously  PSYCH: A+O to person, place, and time; affect appropriate    LABS:                        9.0    8.20  )-----------( 90       ( 17 May 2020 19:00 )             29.7     05-17    141  |  112<H>  |  18  ----------------------------<  78  4.0   |  21<L>  |  1.24    Ca    7.3<L>      17 May 2020 06:05  Phos  2.6     05-17  Mg     2.1     05-17    TPro  4.9<L>  /  Alb  1.7<L>  /  TBili  3.2<H>  /  DBili  x   /  AST  63<H>  /  ALT  62<H>  /  AlkPhos  119  05-17              Culture - Body Fluid with Gram Stain (collected 16 May 2020 18:20)  Source: .Body Fluid Peritoneal Fluid  Gram Stain (16 May 2020 21:51):    polymorphonuclear leukocytes seen    No organisms seen    by cytocentrifuge  Preliminary Report (17 May 2020 17:22):    No growth    Culture - Blood (collected 15 May 2020 16:24)  Source: .Blood Blood-Venous  Preliminary Report (16 May 2020 17:01):    No growth to date.    Culture - Blood (collected 15 May 2020 16:23)  Source: .Blood Blood-Peripheral  Preliminary Report (16 May 2020 17:01):    No growth to date. PROGRESS NOTE:     Patient is a 49y old  Female who presents with a chief complaint of Anemia (17 May 2020 07:48)      SUBJECTIVE / OVERNIGHT EVENTS: Patient seen and examined at bedside w/ no subjective complaints. NAEO        MEDICATIONS  (STANDING):  lidocaine 1% Injectable 20 milliLiter(s) Local Injection once    MEDICATIONS  (PRN):  ALBUTerol    90 MICROgram(s) HFA Inhaler 2 Puff(s) Inhalation every 6 hours PRN Shortness of Breath and/or Wheezing  benzocaine 15 mG/menthol 3.6 mG (Sugar-Free) Lozenge 1 Lozenge Oral every 6 hours PRN Sore Throat      CAPILLARY BLOOD GLUCOSE        I&O's Summary    16 May 2020 07:01  -  17 May 2020 07:00  --------------------------------------------------------  IN: 890 mL / OUT: 1600 mL / NET: -710 mL    17 May 2020 07:01  -  18 May 2020 06:03  --------------------------------------------------------  IN: 240 mL / OUT: 1250 mL / NET: -1010 mL        PHYSICAL EXAM:  Vital Signs Last 24 Hrs  T(C): 36.7 (17 May 2020 22:09), Max: 36.9 (17 May 2020 13:38)  T(F): 98.1 (17 May 2020 22:09), Max: 98.4 (17 May 2020 13:38)  HR: 61 (17 May 2020 22:09) (60 - 76)  BP: 108/67 (17 May 2020 22:09) (108/67 - 127/77)  BP(mean): --  RR: 19 (17 May 2020 22:09) (18 - 19)  SpO2: 98% (17 May 2020 22:09) (97% - 100%)    General: well appearing female in NAD  HEENT: neck supple  Cardiovascular: normal s1 s2 no grm  Respiratory: cta b/l   Abdominal: distended abd, nt  Extremities: 4+ pitting edema in LEs b/l   Neurologic: non focal  Psych: aaox3    LABS:                        9.0    8.20  )-----------( 90       ( 17 May 2020 19:00 )             29.7     05-17    141  |  112<H>  |  18  ----------------------------<  78  4.0   |  21<L>  |  1.24    Ca    7.3<L>      17 May 2020 06:05  Phos  2.6     05-17  Mg     2.1     05-17    TPro  4.9<L>  /  Alb  1.7<L>  /  TBili  3.2<H>  /  DBili  x   /  AST  63<H>  /  ALT  62<H>  /  AlkPhos  119  05-17              Culture - Body Fluid with Gram Stain (collected 16 May 2020 18:20)  Source: .Body Fluid Peritoneal Fluid  Gram Stain (16 May 2020 21:51):    polymorphonuclear leukocytes seen    No organisms seen    by cytocentrifuge  Preliminary Report (17 May 2020 17:22):    No growth    Culture - Blood (collected 15 May 2020 16:24)  Source: .Blood Blood-Venous  Preliminary Report (16 May 2020 17:01):    No growth to date.    Culture - Blood (collected 15 May 2020 16:23)  Source: .Blood Blood-Peripheral  Preliminary Report (16 May 2020 17:01):    No growth to date.

## 2020-05-18 NOTE — PROGRESS NOTE ADULT - PROBLEM SELECTOR PLAN 2
Pt originally with hypotension to the 90s/40s.  Suspect in setting of intravascular depletion and severe anemia. Improved s/p 3u PRBC.  - BPs now 110s/60-70s  - c/w blood transfusion PRN  - s/p albumin 25% 50mL  - consider gentle diuresis   - if persists can give midodrine and consider MICU eval

## 2020-05-18 NOTE — PROGRESS NOTE ADULT - PROBLEM SELECTOR PLAN 1
Pt presented w/ anemia to 3.8 and chronic fatigue. Microcytic. Now improving s/p 4u PRBCs.  Suspect iron deficiency vs anemia of chronic illness. Low suspicion for GI Bleed.  Concerning for malignancy given she had pancreatic mass (unclear hx).  Hapto 61, , low suspicion for hemolysis.  - f/u iron studies, retic count  - Bili 3.3 (1.5 direct, 1.8 indirect)  - Hgb 7.1 s/p 3 uPRBC, will transfuse 1 more  - monitor HgB and transfuse if <7 Pt presented w/ anemia to 3.8 and chronic fatigue. Microcytic. Now improving s/p 4u PRBCs.  Suspect iron deficiency vs anemia of chronic illness. Low suspicion for GI Bleed.  Concerning for malignancy given she had pancreatic mass (unclear hx). Low suspicion for hemolysis.  - iron decreased; supplemented  - Hgb stable at 9  - monitor HgB and transfuse if <7

## 2020-05-19 LAB
ALBUMIN SERPL ELPH-MCNC: 1.7 G/DL — LOW (ref 3.3–5)
ALP SERPL-CCNC: 119 U/L — SIGNIFICANT CHANGE UP (ref 40–120)
ALT FLD-CCNC: 54 U/L — HIGH (ref 4–33)
ANION GAP SERPL CALC-SCNC: 12 MMO/L — SIGNIFICANT CHANGE UP (ref 7–14)
ANION GAP SERPL CALC-SCNC: 8 MMO/L — SIGNIFICANT CHANGE UP (ref 7–14)
AST SERPL-CCNC: 40 U/L — HIGH (ref 4–32)
BASOPHILS # BLD AUTO: 0.02 K/UL — SIGNIFICANT CHANGE UP (ref 0–0.2)
BASOPHILS NFR BLD AUTO: 0.2 % — SIGNIFICANT CHANGE UP (ref 0–2)
BILIRUB SERPL-MCNC: 1.8 MG/DL — HIGH (ref 0.2–1.2)
BUN SERPL-MCNC: 14 MG/DL — SIGNIFICANT CHANGE UP (ref 7–23)
BUN SERPL-MCNC: 15 MG/DL — SIGNIFICANT CHANGE UP (ref 7–23)
CALCIUM SERPL-MCNC: 7.4 MG/DL — LOW (ref 8.4–10.5)
CALCIUM SERPL-MCNC: 7.4 MG/DL — LOW (ref 8.4–10.5)
CANCER AG125 SERPL-ACNC: 239 U/ML — HIGH
CANCER AG19-9 SERPL-ACNC: < 2 U/ML — SIGNIFICANT CHANGE UP
CEA SERPL-MCNC: 18.5 NG/ML — HIGH (ref 1–3.8)
CHLORIDE SERPL-SCNC: 112 MMOL/L — HIGH (ref 98–107)
CHLORIDE SERPL-SCNC: 118 MMOL/L — HIGH (ref 98–107)
CO2 SERPL-SCNC: 23 MMOL/L — SIGNIFICANT CHANGE UP (ref 22–31)
CO2 SERPL-SCNC: 24 MMOL/L — SIGNIFICANT CHANGE UP (ref 22–31)
CREAT SERPL-MCNC: 0.99 MG/DL — SIGNIFICANT CHANGE UP (ref 0.5–1.3)
CREAT SERPL-MCNC: 1.04 MG/DL — SIGNIFICANT CHANGE UP (ref 0.5–1.3)
EOSINOPHIL # BLD AUTO: 0.16 K/UL — SIGNIFICANT CHANGE UP (ref 0–0.5)
EOSINOPHIL NFR BLD AUTO: 1.7 % — SIGNIFICANT CHANGE UP (ref 0–6)
GLUCOSE SERPL-MCNC: 123 MG/DL — HIGH (ref 70–99)
GLUCOSE SERPL-MCNC: 81 MG/DL — SIGNIFICANT CHANGE UP (ref 70–99)
HCT VFR BLD CALC: 30.2 % — LOW (ref 34.5–45)
HGB BLD-MCNC: 9.4 G/DL — LOW (ref 11.5–15.5)
IMM GRANULOCYTES NFR BLD AUTO: 0.8 % — SIGNIFICANT CHANGE UP (ref 0–1.5)
LYMPHOCYTES # BLD AUTO: 0.73 K/UL — LOW (ref 1–3.3)
LYMPHOCYTES # BLD AUTO: 7.6 % — LOW (ref 13–44)
MAGNESIUM SERPL-MCNC: 2 MG/DL — SIGNIFICANT CHANGE UP (ref 1.6–2.6)
MAGNESIUM SERPL-MCNC: 2.1 MG/DL — SIGNIFICANT CHANGE UP (ref 1.6–2.6)
MCHC RBC-ENTMCNC: 25.1 PG — LOW (ref 27–34)
MCHC RBC-ENTMCNC: 31.1 % — LOW (ref 32–36)
MCV RBC AUTO: 80.5 FL — SIGNIFICANT CHANGE UP (ref 80–100)
MONOCYTES # BLD AUTO: 0.86 K/UL — SIGNIFICANT CHANGE UP (ref 0–0.9)
MONOCYTES NFR BLD AUTO: 8.9 % — SIGNIFICANT CHANGE UP (ref 2–14)
NEUTROPHILS # BLD AUTO: 7.77 K/UL — HIGH (ref 1.8–7.4)
NEUTROPHILS NFR BLD AUTO: 80.8 % — HIGH (ref 43–77)
NRBC # FLD: 0.02 K/UL — SIGNIFICANT CHANGE UP (ref 0–0)
PHOSPHATE SERPL-MCNC: 2.9 MG/DL — SIGNIFICANT CHANGE UP (ref 2.5–4.5)
PHOSPHATE SERPL-MCNC: 2.9 MG/DL — SIGNIFICANT CHANGE UP (ref 2.5–4.5)
PLATELET # BLD AUTO: 113 K/UL — LOW (ref 150–400)
PMV BLD: SIGNIFICANT CHANGE UP FL (ref 7–13)
POTASSIUM SERPL-MCNC: 3.8 MMOL/L — SIGNIFICANT CHANGE UP (ref 3.5–5.3)
POTASSIUM SERPL-MCNC: 4.2 MMOL/L — SIGNIFICANT CHANGE UP (ref 3.5–5.3)
POTASSIUM SERPL-SCNC: 3.8 MMOL/L — SIGNIFICANT CHANGE UP (ref 3.5–5.3)
POTASSIUM SERPL-SCNC: 4.2 MMOL/L — SIGNIFICANT CHANGE UP (ref 3.5–5.3)
PROT SERPL-MCNC: 5.2 G/DL — LOW (ref 6–8.3)
RBC # BLD: 3.75 M/UL — LOW (ref 3.8–5.2)
RBC # FLD: 23.4 % — HIGH (ref 10.3–14.5)
SODIUM SERPL-SCNC: 144 MMOL/L — SIGNIFICANT CHANGE UP (ref 135–145)
SODIUM SERPL-SCNC: 153 MMOL/L — HIGH (ref 135–145)
WBC # BLD: 9.62 K/UL — SIGNIFICANT CHANGE UP (ref 3.8–10.5)
WBC # FLD AUTO: 9.62 K/UL — SIGNIFICANT CHANGE UP (ref 3.8–10.5)

## 2020-05-19 PROCEDURE — 99233 SBSQ HOSP IP/OBS HIGH 50: CPT | Mod: GC

## 2020-05-19 RX ORDER — FUROSEMIDE 40 MG
40 TABLET ORAL ONCE
Refills: 0 | Status: COMPLETED | OUTPATIENT
Start: 2020-05-19 | End: 2020-05-20

## 2020-05-19 RX ORDER — FUROSEMIDE 40 MG
40 TABLET ORAL ONCE
Refills: 0 | Status: DISCONTINUED | OUTPATIENT
Start: 2020-05-19 | End: 2020-05-19

## 2020-05-19 RX ORDER — ALBUMIN HUMAN 25 %
100 VIAL (ML) INTRAVENOUS EVERY 6 HOURS
Refills: 0 | Status: COMPLETED | OUTPATIENT
Start: 2020-05-19 | End: 2020-05-20

## 2020-05-19 RX ADMIN — Medication 50 MILLILITER(S): at 23:04

## 2020-05-19 RX ADMIN — Medication 50 MILLILITER(S): at 17:40

## 2020-05-19 RX ADMIN — Medication 325 MILLIGRAM(S): at 12:52

## 2020-05-19 NOTE — PROGRESS NOTE ADULT - SUBJECTIVE AND OBJECTIVE BOX
PROGRESS NOTE:     Patient is a 49y old  Female who presents with a chief complaint of Anemia (18 May 2020 06:03)      SUBJECTIVE / OVERNIGHT EVENTS:        MEDICATIONS  (STANDING):  ferrous    sulfate 325 milliGRAM(s) Oral daily  lidocaine 1% Injectable 20 milliLiter(s) Local Injection once    MEDICATIONS  (PRN):  ALBUTerol    90 MICROgram(s) HFA Inhaler 2 Puff(s) Inhalation every 6 hours PRN Shortness of Breath and/or Wheezing  benzocaine 15 mG/menthol 3.6 mG (Sugar-Free) Lozenge 1 Lozenge Oral every 6 hours PRN Sore Throat      CAPILLARY BLOOD GLUCOSE        I&O's Summary    18 May 2020 07:01  -  19 May 2020 07:00  --------------------------------------------------------  IN: 1080 mL / OUT: 1550 mL / NET: -470 mL        PHYSICAL EXAM:  Vital Signs Last 24 Hrs  T(C): 36.6 (19 May 2020 06:17), Max: 37.1 (18 May 2020 13:20)  T(F): 97.8 (19 May 2020 06:17), Max: 98.8 (18 May 2020 13:20)  HR: 60 (19 May 2020 06:17) (60 - 72)  BP: 115/75 (19 May 2020 06:17) (115/75 - 127/74)  BP(mean): --  RR: 16 (19 May 2020 06:17) (16 - 19)  SpO2: 99% (19 May 2020 06:17) (98% - 100%)    CONSTITUTIONAL: NAD, well-developed  RESPIRATORY: Normal respiratory effort; lungs are clear to auscultation bilaterally  CARDIOVASCULAR: Regular rate and rhythm, normal S1 and S2, no murmur/rub/gallop; No lower extremity edema; Peripheral pulses are 2+ bilaterally  ABDOMEN: Nontender to palpation, normoactive bowel sounds, no rebound/guarding; No hepatosplenomegaly  MUSCLOSKELETAL: no clubbing or cyanosis of digits; no joint swelling or tenderness to palpation  NEURO: CN 2-12 grossly intact, moves all limbs spontaneously  PSYCH: A+O to person, place, and time; affect appropriate    LABS:                        9.7    8.71  )-----------( 107      ( 18 May 2020 07:45 )             32.2     05-18    141  |  113<H>  |  15  ----------------------------<  127<H>  4.0   |  21<L>  |  1.14    Ca    7.5<L>      18 May 2020 07:15    TPro  5.2<L>  /  Alb  1.8<L>  /  TBili  2.3<H>  /  DBili  x   /  AST  43<H>  /  ALT  59<H>  /  AlkPhos  117  05-18              Culture - Body Fluid with Gram Stain (collected 16 May 2020 18:20)  Source: .Body Fluid Peritoneal Fluid  Gram Stain (16 May 2020 21:51):    polymorphonuclear leukocytes seen    No organisms seen    by cytocentrifuge  Preliminary Report (17 May 2020 17:22):    No growth PROGRESS NOTE:     Patient is a 49y old  Female who presents with a chief complaint of Anemia (18 May 2020 06:03)      SUBJECTIVE / OVERNIGHT EVENTS: Denies SOB, chest pain, N/V. Had large urine output after lasix yesterday.    MEDICATIONS  (STANDING):  ferrous    sulfate 325 milliGRAM(s) Oral daily  lidocaine 1% Injectable 20 milliLiter(s) Local Injection once    MEDICATIONS  (PRN):  ALBUTerol    90 MICROgram(s) HFA Inhaler 2 Puff(s) Inhalation every 6 hours PRN Shortness of Breath and/or Wheezing  benzocaine 15 mG/menthol 3.6 mG (Sugar-Free) Lozenge 1 Lozenge Oral every 6 hours PRN Sore Throat      CAPILLARY BLOOD GLUCOSE        I&O's Summary    18 May 2020 07:01  -  19 May 2020 07:00  --------------------------------------------------------  IN: 1080 mL / OUT: 1550 mL / NET: -470 mL        PHYSICAL EXAM:  Vital Signs Last 24 Hrs  T(C): 36.6 (19 May 2020 06:17), Max: 37.1 (18 May 2020 13:20)  T(F): 97.8 (19 May 2020 06:17), Max: 98.8 (18 May 2020 13:20)  HR: 60 (19 May 2020 06:17) (60 - 72)  BP: 115/75 (19 May 2020 06:17) (115/75 - 127/74)  BP(mean): --  RR: 16 (19 May 2020 06:17) (16 - 19)  SpO2: 99% (19 May 2020 06:17) (98% - 100%)    CONSTITUTIONAL: NAD, well-developed  RESPIRATORY: Normal respiratory effort; lungs are clear to auscultation bilaterally  CARDIOVASCULAR: Regular rate and rhythm, normal S1 and S2, no murmur/rub/gallop; No lower extremity edema; Peripheral pulses are 2+ bilaterally  ABDOMEN: Nontender to palpation, normoactive bowel sounds, no rebound/guarding; No hepatosplenomegaly  MUSCULOSKELETAL no clubbing or cyanosis of digits; no joint swelling or tenderness to palpation  NEURO: CN 2-12 grossly intact, moves all limbs spontaneously  PSYCH: A+O to person, place, and time; affect appropriate    LABS:                        9.7    8.71  )-----------( 107      ( 18 May 2020 07:45 )             32.2     05-18    141  |  113<H>  |  15  ----------------------------<  127<H>  4.0   |  21<L>  |  1.14    Ca    7.5<L>      18 May 2020 07:15    TPro  5.2<L>  /  Alb  1.8<L>  /  TBili  2.3<H>  /  DBili  x   /  AST  43<H>  /  ALT  59<H>  /  AlkPhos  117  05-18              Culture - Body Fluid with Gram Stain (collected 16 May 2020 18:20)  Source: .Body Fluid Peritoneal Fluid  Gram Stain (16 May 2020 21:51):    polymorphonuclear leukocytes seen    No organisms seen    by cytocentrifuge  Preliminary Report (17 May 2020 17:22):    No growth PROGRESS NOTE:     Patient is a 49y old  Female who presents with a chief complaint of Anemia (18 May 2020 06:03)      SUBJECTIVE / OVERNIGHT EVENTS: Denies SOB, chest pain, N/V. Had large urine output after lasix yesterday.    MEDICATIONS  (STANDING):  ferrous    sulfate 325 milliGRAM(s) Oral daily  lidocaine 1% Injectable 20 milliLiter(s) Local Injection once    MEDICATIONS  (PRN):  ALBUTerol    90 MICROgram(s) HFA Inhaler 2 Puff(s) Inhalation every 6 hours PRN Shortness of Breath and/or Wheezing  benzocaine 15 mG/menthol 3.6 mG (Sugar-Free) Lozenge 1 Lozenge Oral every 6 hours PRN Sore Throat      CAPILLARY BLOOD GLUCOSE        I&O's Summary    18 May 2020 07:01  -  19 May 2020 07:00  --------------------------------------------------------  IN: 1080 mL / OUT: 1550 mL / NET: -470 mL        PHYSICAL EXAM:  Vital Signs Last 24 Hrs  T(C): 36.6 (19 May 2020 06:17), Max: 37.1 (18 May 2020 13:20)  T(F): 97.8 (19 May 2020 06:17), Max: 98.8 (18 May 2020 13:20)  HR: 60 (19 May 2020 06:17) (60 - 72)  BP: 115/75 (19 May 2020 06:17) (115/75 - 127/74)  BP(mean): --  RR: 16 (19 May 2020 06:17) (16 - 19)  SpO2: 99% (19 May 2020 06:17) (98% - 100%)    General: well appearing female, sitting up in bed  HEENT: neck supple  Cardiovascular: normal s1 s2 no grm  Respiratory: cta b/l   Abdominal: soft, distended, no ttp  Extremities: 4+ pitting edema   Neurologic: non focal  Psych: awake alert answering questions appropriately      LABS:                        9.7    8.71  )-----------( 107      ( 18 May 2020 07:45 )             32.2     05-18    141  |  113<H>  |  15  ----------------------------<  127<H>  4.0   |  21<L>  |  1.14    Ca    7.5<L>      18 May 2020 07:15    TPro  5.2<L>  /  Alb  1.8<L>  /  TBili  2.3<H>  /  DBili  x   /  AST  43<H>  /  ALT  59<H>  /  AlkPhos  117  05-18              Culture - Body Fluid with Gram Stain (collected 16 May 2020 18:20)  Source: .Body Fluid Peritoneal Fluid  Gram Stain (16 May 2020 21:51):    polymorphonuclear leukocytes seen    No organisms seen    by cytocentrifuge  Preliminary Report (17 May 2020 17:22):    No growth

## 2020-05-19 NOTE — PROGRESS NOTE ADULT - PROBLEM SELECTOR PLAN 4
Pt with anasarca (significant LLE and abdominal swelling)  - in the setting of decreased oncotic pressure/ hypoalbuminemia and mild hypoproteinemia  - Duplex w/ no DVT  - total NC <250; SAAG-4.5, LDH elevated at 124, Protein 1.6  - CT abd IV contrast- RP soft tissue density, hepatic lobe lesion  - f/u cytopath results; [id number] Pt with anasarca (significant LLE and abdominal swelling)  - in the setting of decreased oncotic pressure/ hypoalbuminemia and mild hypoproteinemia  - Duplex w/ no DVT  - total NC <250; SAAG-4.5, LDH elevated at 124, Protein 1.6  - CT abd IV contrast- RP soft tissue density, hepatic lobe lesion  - f/u cytopath results; [id number] Pt originally with hypotension to the 90s/40s.  Suspect in setting of intravascular depletion and severe anemia. Improved s/p 3u PRBC.  - BPs now 110s/60-70s  - s/p albumin 25% 50mL  - consider gentle diuresis

## 2020-05-19 NOTE — PROGRESS NOTE ADULT - PROBLEM SELECTOR PLAN 2
Pt originally with hypotension to the 90s/40s.  Suspect in setting of intravascular depletion and severe anemia. Improved s/p 3u PRBC.  - BPs now 110s/60-70s  - s/p albumin 25% 50mL  - consider gentle diuresis Pt with pulmonary nodules not on CT scan, could possibly be malignancy  - CT chest w/ pleural effusion b/l patchy opacities  - Call PMD for outpatient malignancy workup, MRI to evaluate best place for bx, and arrange reliable f/u

## 2020-05-19 NOTE — PROGRESS NOTE ADULT - PROBLEM SELECTOR PLAN 1
Pt presented w/ anemia to 3.8 and chronic fatigue. Microcytic. Now improving s/p 4u PRBCs.  Suspect iron deficiency vs anemia of chronic illness. Low suspicion for GI Bleed.  Concerning for malignancy given she had pancreatic mass (unclear hx). Low suspicion for hemolysis.  - iron lvl decreased; supplemented  - Hgb stable at 9  - monitor HgB and transfuse if <7 Pt presented w/ anemia to 3.8 and chronic fatigue. Microcytic. Now improving s/p 4u PRBCs. Suspect iron deficiency vs anemia of chronic illness. Low suspicion for GI Bleed. Concerning for malignancy given she had pancreatic mass (unclear hx). Low suspicion for hemolysis.  - iron lvl decreased; supplemented  - Hgb stable at 9  - monitor HgB and transfuse if <7 Anasarca (significant LLE and abdominal swelling) in the setting of decreased oncotic pressure/ hypoalbuminemia and mild hypoproteinemia  - s/p therapeutic tap w/ 10.6L; 100ml 25% albumin q6 x 3 doses  - CT abd IV contrast- RP soft tissue density, hepatic lobe lesion  - MR Liver for further characterization of lesion for poss bx  - total NC <250; SAAG-4.5, LDH elevated at 124, Protein 1.6  - cytopath shows no malignant cells  - Duplex w/ no DVT

## 2020-05-19 NOTE — PROGRESS NOTE ADULT - PROBLEM SELECTOR PLAN 5
Trop elevated to 85 initially, now downtrending, patient not with CP, EKG without evidence to suggest ischemic event, suspect in setting of demand supply mismatch and worsening kidney function   - downtrended suspect pre-renal SCOTT in the setting of intravascular depletion/third spacing and hypotension; resolving  - monitor Cr and avoid nephrotoxic agents/ renally dose meds  - UA neg

## 2020-05-19 NOTE — PROCEDURE NOTE - NSPROCDETAILS_GEN_ALL_CORE
location identified, sterile technique used, catheter introduced, fluid drained/sterile dressing applied/ultrasound utilization/Seldinger technique
location identified, sterile technique used, catheter introduced, fluid drained

## 2020-05-19 NOTE — PROCEDURE NOTE - NSSITEPREP_SKIN_A_CORE
chlorhexidine
povidone iodine (if allergic to chlorhexidine)/Adherence to aseptic technique: hand hygiene prior to donning barriers (gown, gloves), don cap and mask, sterile drape over patient

## 2020-05-19 NOTE — PROGRESS NOTE ADULT - PROBLEM SELECTOR PLAN 8
Pt with pulmonary nodules not on CT scan, could possibly be malignancy  - CT chest w/ pleural effusion b/l patchy opacities  - Call PMD for outpatient malignancy workup, MRI to evaluate best place for bx, and arrange reliable f/u Likely in setting of hepatic congestion, ascites demonstrated on CT but no evidence of cirrhosis. Improving. Elevated indirect bili.  - Hep panel neg  - monitor

## 2020-05-19 NOTE — PROGRESS NOTE ADULT - PROBLEM SELECTOR PLAN 3
suspect pre-renal SCOTT in the setting of intravascular depletion/third spacing and hypotension; resolving  - monitor Cr and avoid nephrotoxic agents/ renally dose meds  - UA neg Pt presented w/ anemia to 3.8 and chronic fatigue. Microcytic. Now improving s/p 4u PRBCs. Suspect iron deficiency vs anemia of chronic illness. Low suspicion for GI Bleed. Concerning for malignancy given she had pancreatic mass (unclear hx). Low suspicion for hemolysis.  - iron lvl decreased; supplemented  - Hgb stable at 9  - monitor HgB and transfuse if <7

## 2020-05-19 NOTE — PROCEDURE NOTE - ADDITIONAL PROCEDURE DETAILS
Invasive procedure team was consulted for diagnostic/therapeutic paracentesis due to abdominal discomfort. Explained risks (including bleeding, infection, and bowel perforation) and benefits to patient and patient expressed understanding and consented. Ultrasound was utilized and visualized 10cm Ascitic fluid pocket identified w/ no epigastric vessels visualized. No rash or vessels overlying skin site. Pts plts 113, INR 1.78, DVT PPx held over night, not on NOACs or coumadin. Sterile technique was used and 5mL of 1% lidocaine was used for local anesthesia. Small incision with scalpel done and 18 Fr Arrow Paracentesis catheter used. ____L of clear yellow ascitic fluid drained. Catheter removed and dressed. Pt tolerated procedure well and no complications. No lab analysis sent. Communicated to primary team. Invasive procedure team was consulted for diagnostic/therapeutic paracentesis due to abdominal discomfort. Explained risks (including bleeding, infection, and bowel perforation) and benefits to patient and patient expressed understanding and consented. Ultrasound was utilized and visualized 10cm Ascitic fluid pocket identified w/ no epigastric vessels visualized. No rash or vessels overlying skin site. Pts plts 113, INR 1.78, DVT PPx held over night, not on NOACs or coumadin. Sterile technique was used and 5mL of 1% lidocaine was used for local anesthesia. Small incision with scalpel done and 18 Fr Arrow Paracentesis catheter used. 10.6L of clear yellow ascitic fluid drained. Catheter removed and dressed. Pt tolerated procedure well and no complications. No lab analysis sent. Communicated to primary team.

## 2020-05-19 NOTE — PROGRESS NOTE ADULT - PROBLEM SELECTOR PLAN 7
Likely in setting of hepatic congestion, ascites demonstrated on CT but no evidence of cirrhosis. Improving. Elevated indirect bili.  - Hep panel neg  - monitor Pt with leukocytosis and elevated temp, suspect possible infection vs reactive  - abx d/c; leukocytosis resolved  - BCx/UCx NGTD  - no SBP per dx paracentesis

## 2020-05-19 NOTE — PROGRESS NOTE ADULT - PROBLEM SELECTOR PLAN 6
Pt with leukocytosis and elevated temp, suspect possible infection vs reactive  - abx d/c; leukocytosis resolved  - BCx/UCx NGTD  - no SBP per dx paracentesis Trop elevated to 85 initially, now downtrending, patient not with CP, EKG without evidence to suggest ischemic event, suspect in setting of demand supply mismatch and worsening kidney function   - downtrended

## 2020-05-20 LAB
ALBUMIN SERPL ELPH-MCNC: 2.3 G/DL — LOW (ref 3.3–5)
ALP SERPL-CCNC: 84 U/L — SIGNIFICANT CHANGE UP (ref 40–120)
ALT FLD-CCNC: 35 U/L — HIGH (ref 4–33)
ANION GAP SERPL CALC-SCNC: 7 MMO/L — SIGNIFICANT CHANGE UP (ref 7–14)
AST SERPL-CCNC: 35 U/L — HIGH (ref 4–32)
BILIRUB SERPL-MCNC: 2.3 MG/DL — HIGH (ref 0.2–1.2)
BUN SERPL-MCNC: 13 MG/DL — SIGNIFICANT CHANGE UP (ref 7–23)
CALCIUM SERPL-MCNC: 7.5 MG/DL — LOW (ref 8.4–10.5)
CHLORIDE SERPL-SCNC: 111 MMOL/L — HIGH (ref 98–107)
CO2 SERPL-SCNC: 24 MMOL/L — SIGNIFICANT CHANGE UP (ref 22–31)
CREAT SERPL-MCNC: 0.82 MG/DL — SIGNIFICANT CHANGE UP (ref 0.5–1.3)
CULTURE RESULTS: SIGNIFICANT CHANGE UP
CULTURE RESULTS: SIGNIFICANT CHANGE UP
GLUCOSE SERPL-MCNC: 90 MG/DL — SIGNIFICANT CHANGE UP (ref 70–99)
HCT VFR BLD CALC: 26.4 % — LOW (ref 34.5–45)
HGB BLD-MCNC: 7.9 G/DL — LOW (ref 11.5–15.5)
MAGNESIUM SERPL-MCNC: 2 MG/DL — SIGNIFICANT CHANGE UP (ref 1.6–2.6)
MCHC RBC-ENTMCNC: 24.1 PG — LOW (ref 27–34)
MCHC RBC-ENTMCNC: 29.9 % — LOW (ref 32–36)
MCV RBC AUTO: 80.5 FL — SIGNIFICANT CHANGE UP (ref 80–100)
NRBC # FLD: 0 K/UL — SIGNIFICANT CHANGE UP (ref 0–0)
OSMOLALITY SERPL: 295 MOSMO/KG — SIGNIFICANT CHANGE UP (ref 275–295)
OSMOLALITY UR: 590 MOSMO/KG — SIGNIFICANT CHANGE UP (ref 50–1200)
PHOSPHATE SERPL-MCNC: 2.6 MG/DL — SIGNIFICANT CHANGE UP (ref 2.5–4.5)
PLATELET # BLD AUTO: 96 K/UL — LOW (ref 150–400)
PMV BLD: SIGNIFICANT CHANGE UP FL (ref 7–13)
POTASSIUM SERPL-MCNC: 3.5 MMOL/L — SIGNIFICANT CHANGE UP (ref 3.5–5.3)
POTASSIUM SERPL-SCNC: 3.5 MMOL/L — SIGNIFICANT CHANGE UP (ref 3.5–5.3)
PROT SERPL-MCNC: 4.7 G/DL — LOW (ref 6–8.3)
RBC # BLD: 3.28 M/UL — LOW (ref 3.8–5.2)
RBC # FLD: 24.8 % — HIGH (ref 10.3–14.5)
SODIUM SERPL-SCNC: 142 MMOL/L — SIGNIFICANT CHANGE UP (ref 135–145)
SODIUM UR-SCNC: < 20 MMOL/L — SIGNIFICANT CHANGE UP
SPECIMEN SOURCE: SIGNIFICANT CHANGE UP
SPECIMEN SOURCE: SIGNIFICANT CHANGE UP
WBC # BLD: 4.98 K/UL — SIGNIFICANT CHANGE UP (ref 3.8–10.5)
WBC # FLD AUTO: 4.98 K/UL — SIGNIFICANT CHANGE UP (ref 3.8–10.5)

## 2020-05-20 PROCEDURE — 99223 1ST HOSP IP/OBS HIGH 75: CPT

## 2020-05-20 PROCEDURE — 74182 MRI ABDOMEN W/CONTRAST: CPT | Mod: 26

## 2020-05-20 PROCEDURE — 99233 SBSQ HOSP IP/OBS HIGH 50: CPT | Mod: GC

## 2020-05-20 RX ADMIN — Medication 325 MILLIGRAM(S): at 13:59

## 2020-05-20 RX ADMIN — Medication 40 MILLIGRAM(S): at 08:17

## 2020-05-20 RX ADMIN — Medication 50 MILLILITER(S): at 05:47

## 2020-05-20 NOTE — CHART NOTE - NSCHARTNOTEFT_GEN_A_CORE
NUTRITION SERVICES     Upon Nutritional Assessment by the Registered Dietitian your patient was determined to meet criteria/ has evidence of the following diagnosis/diagnoses:  [ ] Mild Protein Calorie Malnutrition   [ ] Moderate Protein Calorie Malnutrition   [X] Severe Protein Calorie Malnutrition   [ ] Unspecified Protein Calorie Malnutrition   [ ] Underweight / BMI <19  [ ] Morbid Obesity / BMI >40    Findings as based on:  •  Comprehensive nutritional assessment and consultation    Please refer to Initial Dietitian Evaluation via documents section of [employer] EMR for further recommendations.    Deb Ferrell, MS, RDN, CDN

## 2020-05-20 NOTE — DIETITIAN INITIAL EVALUATION ADULT. - PERTINENT LABORATORY DATA
05-20 Na142 mmol/L Glu 90 mg/dL K+ 3.5 mmol/L Cr  0.82 mg/dL BUN 13 mg/dL 05-20 Phos 2.6 mg/dL 05-20 Alb 2.3 g/dL<L>

## 2020-05-20 NOTE — PROGRESS NOTE ADULT - PROBLEM SELECTOR PLAN 1
Anasarca (significant LLE and abdominal swelling) in the setting of decreased oncotic pressure/ hypoalbuminemia and mild hypoproteinemia  - s/p therapeutic tap w/ 10.6L; 100ml 25% albumin q6 x 3 doses  - Lasix 4 x1  - CT abd IV contrast- RP soft tissue density, hepatic lobe lesion  - MR Liver for further characterization of lesion for poss bx  - total NC <250; SAAG-4.5, LDH elevated at 124, Protein 1.6  - cytopath shows no malignant cells  - Duplex w/ no DVT Anasarca (significant LLE and abdominal swelling) in the setting of decreased oncotic pressure/ hypoalbuminemia and mild hypoproteinemia  - MR Liver show hepatic lesions c/w hemangioma and Perivascular soft tissue encasing celiac axis/SMA  - IR unable to bx given anatomical location; poss surg c/s for bx  - s/p therapeutic tap w/ 10.6L; 100ml 25% albumin q6 x 3 doses  - Lasix 40 x1  - CT abd IV contrast- RP soft tissue density, hepatic lobe lesion  - total NC <250; SAAG-4.5, LDH elevated at 124, Protein 1.6  - cytopath shows no malignant cells  - Duplex w/ no DVT

## 2020-05-20 NOTE — PROGRESS NOTE ADULT - PROBLEM SELECTOR PLAN 3
Pt presented w/ anemia to 3.8 and chronic fatigue. Microcytic. Now improving s/p 4u PRBCs. Suspect iron deficiency vs anemia of chronic illness. Low suspicion for GI Bleed. Concerning for malignancy given she had pancreatic mass (unclear hx). Low suspicion for hemolysis.  - iron lvl decreased; supplemented  - Hgb stable at 9  - monitor HgB and transfuse if <7

## 2020-05-20 NOTE — DIETITIAN INITIAL EVALUATION ADULT. - PROBLEM SELECTOR PLAN 8
Pt with pulmonary nodules not on CT scan, could possibly be malignancy  -will obtain CT chest to further characterize  results d/w patinet

## 2020-05-20 NOTE — PROGRESS NOTE ADULT - PROBLEM SELECTOR PLAN 8
Likely in setting of hepatic congestion, ascites demonstrated on CT but no evidence of cirrhosis. Improving. Elevated indirect bili.  - Hep panel neg  - monitor

## 2020-05-20 NOTE — CONSULT NOTE ADULT - ASSESSMENT
Assessment:   49 year-old female with history of pancreatic mass (benign as per patient) - status-post resection in 2011 (s/p whipple) at [facility] who presented from her PCP office due to abnormal labs and weakness. Patient found with microcytic anemia to 3.6. Also with thrombocytopenia, leukocytosis, elevated LFTS. she had paracentesis which did not show malignant cells. She has perivascular soft tissue encasing celiac axis/SMA better appreciated on CT. Surgical oncology called for biopsy      - recommend obtaining records from [facility] of her surgery, pathology. Inform her surgeon that she had been hospitalized.   - Recommend medical oncology consult, ?may benefit from PET  - Recommend GI consult for evaluation of EUS biopsy of celiac and SMA soft tissue density.   - Discussed with Dr. Rivas     [id number]

## 2020-05-20 NOTE — PROGRESS NOTE ADULT - SUBJECTIVE AND OBJECTIVE BOX
PROGRESS NOTE:     Patient is a 49y old  Female who presents with a chief complaint of Anemia (19 May 2020 07:05)      SUBJECTIVE / OVERNIGHT EVENTS:        MEDICATIONS  (STANDING):  ferrous    sulfate 325 milliGRAM(s) Oral daily  furosemide   Injectable 40 milliGRAM(s) IV Push once  lidocaine 1% Injectable 20 milliLiter(s) Local Injection once    MEDICATIONS  (PRN):  ALBUTerol    90 MICROgram(s) HFA Inhaler 2 Puff(s) Inhalation every 6 hours PRN Shortness of Breath and/or Wheezing  benzocaine 15 mG/menthol 3.6 mG (Sugar-Free) Lozenge 1 Lozenge Oral every 6 hours PRN Sore Throat      CAPILLARY BLOOD GLUCOSE        I&O's Summary    19 May 2020 07:01  -  20 May 2020 07:00  --------------------------------------------------------  IN: 880 mL / OUT: 500 mL / NET: 380 mL        PHYSICAL EXAM:  Vital Signs Last 24 Hrs  T(C): 36.4 (19 May 2020 23:30), Max: 36.7 (19 May 2020 14:40)  T(F): 97.5 (19 May 2020 23:30), Max: 98 (19 May 2020 14:40)  HR: 66 (19 May 2020 23:30) (60 - 66)  BP: 122/62 (19 May 2020 23:30) (121/78 - 128/58)  BP(mean): --  RR: 18 (19 May 2020 23:30) (18 - 18)  SpO2: 100% (19 May 2020 23:30) (98% - 100%)    CONSTITUTIONAL: NAD, well-developed  RESPIRATORY: Normal respiratory effort; lungs are clear to auscultation bilaterally  CARDIOVASCULAR: Regular rate and rhythm, normal S1 and S2, no murmur/rub/gallop; No lower extremity edema; Peripheral pulses are 2+ bilaterally  ABDOMEN: Nontender to palpation, normoactive bowel sounds, no rebound/guarding; No hepatosplenomegaly  MUSCLOSKELETAL: no clubbing or cyanosis of digits; no joint swelling or tenderness to palpation  NEURO: CN 2-12 grossly intact, moves all limbs spontaneously  PSYCH: A+O to person, place, and time; affect appropriate    LABS:                        9.4    9.62  )-----------( 113      ( 19 May 2020 06:40 )             30.2     05-19    144  |  112<H>  |  14  ----------------------------<  123<H>  4.2   |  24  |  0.99    Ca    7.4<L>      19 May 2020 14:00  Phos  2.9     05-19  Mg     2.0     05-19    TPro  5.2<L>  /  Alb  1.7<L>  /  TBili  1.8<H>  /  DBili  x   /  AST  40<H>  /  ALT  54<H>  /  AlkPhos  119  05-19                RADIOLOGY & ADDITIONAL TESTS:  Results Reviewed:   Imaging Personally Reviewed:  Electrocardiogram Personally Reviewed:    COORDINATION OF CARE:  Care Discussed with Consultants/Other Providers [Y/N]:  Prior or Outpatient Records Reviewed [Y/N]: PROGRESS NOTE:     Patient is a 49y old  Female who presents with a chief complaint of Anemia (19 May 2020 07:05)      SUBJECTIVE / OVERNIGHT EVENTS: patient w/ no subjective complaints. States leg/abd leg swelling w/ improvement w/ lasix. Otherwise NAEO.      MEDICATIONS  (STANDING):  ferrous    sulfate 325 milliGRAM(s) Oral daily  furosemide   Injectable 40 milliGRAM(s) IV Push once  lidocaine 1% Injectable 20 milliLiter(s) Local Injection once    MEDICATIONS  (PRN):  ALBUTerol    90 MICROgram(s) HFA Inhaler 2 Puff(s) Inhalation every 6 hours PRN Shortness of Breath and/or Wheezing  benzocaine 15 mG/menthol 3.6 mG (Sugar-Free) Lozenge 1 Lozenge Oral every 6 hours PRN Sore Throat      CAPILLARY BLOOD GLUCOSE        I&O's Summary    19 May 2020 07:01  -  20 May 2020 07:00  --------------------------------------------------------  IN: 880 mL / OUT: 500 mL / NET: 380 mL        PHYSICAL EXAM:  Vital Signs Last 24 Hrs  T(C): 36.4 (19 May 2020 23:30), Max: 36.7 (19 May 2020 14:40)  T(F): 97.5 (19 May 2020 23:30), Max: 98 (19 May 2020 14:40)  HR: 66 (19 May 2020 23:30) (60 - 66)  BP: 122/62 (19 May 2020 23:30) (121/78 - 128/58)  BP(mean): --  RR: 18 (19 May 2020 23:30) (18 - 18)  SpO2: 100% (19 May 2020 23:30) (98% - 100%)    General: well appearing female, sitting up in bed  HEENT: neck supple  Cardiovascular: normal s1 s2 no grm  Respiratory: cta b/l   Abdominal: soft, less distended than prior exam, no ttp  Extremities: 4+ pitting edema   Neurologic: non focal  Psych: awake alert answering questions appropriately    LABS:                        9.4    9.62  )-----------( 113      ( 19 May 2020 06:40 )             30.2     05-19    144  |  112<H>  |  14  ----------------------------<  123<H>  4.2   |  24  |  0.99    Ca    7.4<L>      19 May 2020 14:00  Phos  2.9     05-19  Mg     2.0     05-19    TPro  5.2<L>  /  Alb  1.7<L>  /  TBili  1.8<H>  /  DBili  x   /  AST  40<H>  /  ALT  54<H>  /  AlkPhos  119  05-19

## 2020-05-20 NOTE — CONSULT NOTE ADULT - SUBJECTIVE AND OBJECTIVE BOX
[facility] Vascular Surgery Consultation     Patient is a 49y old  Female who presents with a chief complaint of Anemia       HPI:  49 year-old female with history of pancreatic mass (benign as per patient) - status-post resection in  (s/p whipple) at [facility] who presented from her PCP office due to abnormal labs and weakness. Patient found with microcytic anemia to 3.6. Also with thrombocytopenia, leukocytosis, elevated LFTS. Also with hypotension in the setting of decreased intravascular volume. Also with SCOTT.   She denies family history of pancreatic cancer but as per the chart he sister had ovarian cancer. During this admission, she had CT chest/abdomen and MRI abdomen. The imaging studies showed 2 small hepatic lesions consistent with hemangioma. No hepatic metastasis. Severe hepatic steatosis. Large volume ascites. Perivascular soft tissue encasing celiac axis/SMA better appreciated on CT. IR was called for biopsy of the lesions but it was denied because it is not accessible. Surgical oncology called for biopsy        PAST MEDICAL & SURGICAL HISTORY:  Anemia  Asthma  History of pancreatic surgery: Whipple      FAMILY HISTORY:  FH: ovarian cancer: Sister  FH: hypertension: Father  FH: type 2 diabetes: Mother and Father      SOCIAL HISTORY:  not smoker     MEDICATIONS  (STANDING):  ferrous    sulfate 325 milliGRAM(s) Oral daily  lidocaine 1% Injectable 20 milliLiter(s) Local Injection once    MEDICATIONS  (PRN):  ALBUTerol    90 MICROgram(s) HFA Inhaler 2 Puff(s) Inhalation every 6 hours PRN Shortness of Breath and/or Wheezing  benzocaine 15 mG/menthol 3.6 mG (Sugar-Free) Lozenge 1 Lozenge Oral every 6 hours PRN Sore Throat    Allergies    IV Contrast (Unknown)  Seafood (Anaphylaxis)    Intolerances        Vital Signs Last 24 Hrs  T(C): 36.6 (20 May 2020 17:55), Max: 36.6 (20 May 2020 14:12)  T(F): 97.9 (20 May 2020 17:55), Max: 97.9 (20 May 2020 17:55)  HR: 62 (20 May 2020 17:55) (62 - 68)  BP: 121/62 (20 May 2020 17:55) (113/74 - 122/62)  BP(mean): --  RR: 17 (20 May 2020 17:55) (17 - 18)  SpO2: 100% (20 May 2020 17:55) (100% - 100%)  Daily     Daily Weight in k.7 (20 May 2020 16:18)    General: WN/WD NAD  Neurology: A&Ox3, nonfocal, FENTON x 4  Respiratory: CTA B/L  CV: RRR  Abdominal: Soft, distended, not tender, midline scar noted   MSK: significant 2+ edema in lower extremities                              7.9    4.98  )-----------( 96       ( 20 May 2020 06:48 )             26.4     05-20    142  |  111<H>  |  13  ----------------------------<  90  3.5   |  24  |  0.82    Ca    7.5<L>      20 May 2020 06:48  Phos  2.6     05-20  Mg     2.0         TPro  4.7<L>  /  Alb  2.3<L>  /  TBili  2.3<H>  /  DBili  x   /  AST  35<H>  /  ALT  35<H>  /  AlkPhos  84  05-20          Radiographic Findings:   MR abdomen:   2 small hepatic lesions consistent with hemangioma. No hepatic metastasis. Severe hepatic steatosis. Persistent large volume ascites.    Limited evaluation of the vascular structures. Perivascular soft tissue encasing celiac axis/SMA better appreciated on CT.    Mild bilateral hydronephrosis? Urinary retention.      CT chest/abdomen:     CT findings of pulmonary infection/inflammation compatible with many entities (C19V-2).    Confluent retroperitoneal soft tissue density surrounding the celiac axis and SMA, concerning for malignancy.    Indeterminate hyperenhancing lesions in the right hepatic lobe largest measuring 1.2 cm.    Pleural effusions, anasarca, and large abdominal pelvic ascites.

## 2020-05-20 NOTE — PROGRESS NOTE ADULT - PROBLEM SELECTOR PLAN 7
Pt with leukocytosis and elevated temp, suspect possible infection vs reactive  - abx d/c; leukocytosis resolved  - BCx/UCx NGTD  - no SBP per dx paracentesis

## 2020-05-20 NOTE — DIETITIAN INITIAL EVALUATION ADULT. - PROBLEM SELECTOR PLAN 6
Pt with leukocytosis and elevated temp, suspect possible infection vs reactive  -will start abx (vanc and zosyn)  -f/u bcx, ucx, COVID

## 2020-05-20 NOTE — DIETITIAN INITIAL EVALUATION ADULT. - OTHER INFO
RD consult received for assessment and education.  Patient a 49 year-old female with history of pancreatic mass (benign as per patient) s/p Whipple in 2011 who presented to the ED due to abnormal labs and weakness. Patient found with microcytic anemia; and also with thrombocytopenia, leukocytosis, elevated LFTs; also with hypotension in the setting of decreased intravascular volume; SCOTT which is noted ot be improving. Patient is s/p paracentesis. Patient noted with h/o microcytic anemia.    RD attempted to call patient directly at telephone number provided by spouse - [phone].  Unable to obtain weight/diet & previous nutritional history.  No historical height or weight data per HIE tab of the EMR.  Patient is noted with 3-4+ edema per nursing flowsheets (anasarca).      RD to reattempt obtaining objective and subjective data for needs of nutritional assessment.

## 2020-05-20 NOTE — DIETITIAN INITIAL EVALUATION ADULT. - PROBLEM SELECTOR PLAN 1
I will send in some tessalon.  Olivia Cary PA-C    Pt with anemia to 3.8 and chronic fatigue. Microcytic. Suspect iron deficiency vs anemia of chronic illness. Low suspicion for GI Bleed. Concerning for malignancy given she had pancreatic mass (unclear hx). Also consider hemolysis  -f/u iron studies, LDH, haptoglobin, retic count  -transfusing 2 U PRBC, Follow up post transfusion CBC  -monitor HgB and transfuse if <7  -a/w generalized weakness; PT consult

## 2020-05-20 NOTE — PROGRESS NOTE ADULT - PROBLEM SELECTOR PLAN 5
suspect pre-renal SCOTT in the setting of intravascular depletion/third spacing and hypotension; resolving  - monitor Cr and avoid nephrotoxic agents/ renally dose meds  - UA neg suspect pre-renal SCOTT in the setting of intravascular depletion/third spacing and hypotension; resolved  - monitor Cr and avoid nephrotoxic agents/ renally dose meds

## 2020-05-20 NOTE — DIETITIAN INITIAL EVALUATION ADULT. - ADD RECOMMEND
1) Monitor weights, PO intake/diet tolerance, skin integrity, pertinent labs. 2) Obtain objective and subjective data as feasible; reassess nutritional needs and plan of care accordingly.

## 2020-05-20 NOTE — DIETITIAN INITIAL EVALUATION ADULT. - PERTINENT MEDS FT
MEDICATIONS  (STANDING):  ferrous    sulfate 325 milliGRAM(s) Oral daily  lidocaine 1% Injectable 20 milliLiter(s) Local Injection once    MEDICATIONS  (PRN):  ALBUTerol    90 MICROgram(s) HFA Inhaler 2 Puff(s) Inhalation every 6 hours PRN Shortness of Breath and/or Wheezing  benzocaine 15 mG/menthol 3.6 mG (Sugar-Free) Lozenge 1 Lozenge Oral every 6 hours PRN Sore Throat

## 2020-05-20 NOTE — PROGRESS NOTE ADULT - PROBLEM SELECTOR PLAN 2
Pt with pulmonary nodules not on CT scan, could possibly be malignancy  - CT chest w/ pleural effusion b/l patchy opacities  - Call PMD for outpatient malignancy workup; will obtain MRI to evaluate best place for bx, and arrange reliable f/u Pt with pulmonary nodules not on CT scan, could possibly be malignancy  - CT chest w/ pleural effusion b/l patchy opacities  - Unable to contact PMD for outpatient malignancy workup, will cont to contact to arrange reliable f/u

## 2020-05-20 NOTE — PROGRESS NOTE ADULT - PROBLEM SELECTOR PLAN 4
Pt originally with hypotension to the 90s/40s.  Suspect in setting of intravascular depletion and severe anemia. Improved s/p 3u PRBC.  - BPs now 110s/60-70s  - s/p albumin 25% 50mL  - consider gentle diuresis Pt originally with hypotension to the 90s/40s.  Suspect in setting of intravascular depletion and severe anemia. Improved s/p 3u PRBC; resolved

## 2020-05-21 LAB
ALBUMIN SERPL ELPH-MCNC: 2 G/DL — LOW (ref 3.3–5)
ALP SERPL-CCNC: 93 U/L — SIGNIFICANT CHANGE UP (ref 40–120)
ALT FLD-CCNC: 40 U/L — HIGH (ref 4–33)
ANION GAP SERPL CALC-SCNC: 7 MMO/L — SIGNIFICANT CHANGE UP (ref 7–14)
AST SERPL-CCNC: 40 U/L — HIGH (ref 4–32)
BILIRUB SERPL-MCNC: 1.8 MG/DL — HIGH (ref 0.2–1.2)
BUN SERPL-MCNC: 9 MG/DL — SIGNIFICANT CHANGE UP (ref 7–23)
CALCIUM SERPL-MCNC: 7.3 MG/DL — LOW (ref 8.4–10.5)
CHLORIDE SERPL-SCNC: 110 MMOL/L — HIGH (ref 98–107)
CO2 SERPL-SCNC: 26 MMOL/L — SIGNIFICANT CHANGE UP (ref 22–31)
CREAT SERPL-MCNC: 0.68 MG/DL — SIGNIFICANT CHANGE UP (ref 0.5–1.3)
CULTURE RESULTS: SIGNIFICANT CHANGE UP
GAMMA INTERFERON BACKGROUND BLD IA-ACNC: 0 IU/ML — SIGNIFICANT CHANGE UP
GLUCOSE SERPL-MCNC: 91 MG/DL — SIGNIFICANT CHANGE UP (ref 70–99)
HCT VFR BLD CALC: 26.4 % — LOW (ref 34.5–45)
HGB BLD-MCNC: 8.1 G/DL — LOW (ref 11.5–15.5)
M TB IFN-G BLD-IMP: HIGH
M TB IFN-G CD4+ BCKGRND COR BLD-ACNC: 0.01 IU/ML — SIGNIFICANT CHANGE UP
M TB IFN-G CD4+CD8+ BCKGRND COR BLD-ACNC: 0 IU/ML — SIGNIFICANT CHANGE UP
MAGNESIUM SERPL-MCNC: 1.9 MG/DL — SIGNIFICANT CHANGE UP (ref 1.6–2.6)
MCHC RBC-ENTMCNC: 25.2 PG — LOW (ref 27–34)
MCHC RBC-ENTMCNC: 30.7 % — LOW (ref 32–36)
MCV RBC AUTO: 82.2 FL — SIGNIFICANT CHANGE UP (ref 80–100)
NRBC # FLD: 0 K/UL — SIGNIFICANT CHANGE UP (ref 0–0)
PHOSPHATE SERPL-MCNC: 2.7 MG/DL — SIGNIFICANT CHANGE UP (ref 2.5–4.5)
PLATELET # BLD AUTO: 98 K/UL — LOW (ref 150–400)
PMV BLD: SIGNIFICANT CHANGE UP FL (ref 7–13)
POTASSIUM SERPL-MCNC: 3.6 MMOL/L — SIGNIFICANT CHANGE UP (ref 3.5–5.3)
POTASSIUM SERPL-SCNC: 3.6 MMOL/L — SIGNIFICANT CHANGE UP (ref 3.5–5.3)
PROT SERPL-MCNC: 4.5 G/DL — LOW (ref 6–8.3)
QUANT TB PLUS MITOGEN MINUS NIL: 0.35 IU/ML — SIGNIFICANT CHANGE UP
RBC # BLD: 3.21 M/UL — LOW (ref 3.8–5.2)
RBC # FLD: 26.2 % — HIGH (ref 10.3–14.5)
SODIUM SERPL-SCNC: 143 MMOL/L — SIGNIFICANT CHANGE UP (ref 135–145)
SPECIMEN SOURCE: SIGNIFICANT CHANGE UP
WBC # BLD: 6.15 K/UL — SIGNIFICANT CHANGE UP (ref 3.8–10.5)
WBC # FLD AUTO: 6.15 K/UL — SIGNIFICANT CHANGE UP (ref 3.8–10.5)

## 2020-05-21 PROCEDURE — 99233 SBSQ HOSP IP/OBS HIGH 50: CPT | Mod: GC

## 2020-05-21 PROCEDURE — 99222 1ST HOSP IP/OBS MODERATE 55: CPT | Mod: GC

## 2020-05-21 PROCEDURE — 93306 TTE W/DOPPLER COMPLETE: CPT | Mod: 26

## 2020-05-21 PROCEDURE — 99232 SBSQ HOSP IP/OBS MODERATE 35: CPT

## 2020-05-21 RX ORDER — FUROSEMIDE 40 MG
40 TABLET ORAL ONCE
Refills: 0 | Status: COMPLETED | OUTPATIENT
Start: 2020-05-21 | End: 2020-05-21

## 2020-05-21 RX ORDER — FUROSEMIDE 40 MG
40 TABLET ORAL DAILY
Refills: 0 | Status: DISCONTINUED | OUTPATIENT
Start: 2020-05-21 | End: 2020-05-22

## 2020-05-21 RX ADMIN — Medication 325 MILLIGRAM(S): at 12:18

## 2020-05-21 RX ADMIN — Medication 40 MILLIGRAM(S): at 17:34

## 2020-05-21 RX ADMIN — Medication 40 MILLIGRAM(S): at 11:20

## 2020-05-21 NOTE — CHART NOTE - NSCHARTNOTEFT_GEN_A_CORE
RD was able to speak with patient this morning.  Patient stated she did not have an appetite for several days PTA, but appetite has improved over the past 2 days.  She requested for a soft diet, as she indicated she has a sore mouth at this time.  Denies chewing or swallowing difficulty.  RD obtained food and beverage preferences to aid in optimizing PO intake.  Denies GI distress ie. nausea, vomiting, diarrhea at this time.  RD educated patient on the importance of increasing protein and calorie intake, amenable to supplements - Ensure & ProSource No Carb (Recommend Ensure Enlive 240mls 2x daily (700kcal, 40g protein) & ProSource No Carb 1x daily (60kcal, 15gm protein).  Additional food items as discussed with patient will also be provided as discussed.  Patient reported usual body weight of 160 pounds with gain to 200 pounds prior to admission due to increasing edema/fluid accumulation.  RD communicated request for order verification to Team 3.  RD remains available for further monitoring, intervention and recommendations.      Deb Ferrell, MS, RDN, CDN  Pager [phone]

## 2020-05-21 NOTE — PROGRESS NOTE ADULT - ASSESSMENT
Assessment:   49 year-old female with history of pancreatic mass (benign as per patient) - status-post resection in 2011 (s/p whipple) at [facility] who presented from her PCP office due to abnormal labs and weakness. Patient found with microcytic anemia to 3.6. Also with thrombocytopenia, leukocytosis, elevated LFTS. she had paracentesis which did not show malignant cells. She has perivascular soft tissue encasing celiac axis/SMA better appreciated on CT. Surgical oncology called for biopsy      - Please obtain records from [facility] of her surgery, pathology. Inform her surgeon that she had been hospitalized.   - Recommend Hepatology consult -- no clear explanation for ascites. Portal vein and SMV appear patent. Liver lesions appear to be hemangiomas on CT. HepB/C panel negative. Ascites negative for malignant cells.  - Recommend EUS biopsy of celiac and SMA soft tissue density.     Seen with Dr. Rivas   Pager [phone]

## 2020-05-21 NOTE — PROGRESS NOTE ADULT - SUBJECTIVE AND OBJECTIVE BOX
PROGRESS NOTE:     Patient is a 49y old  Female who presents with a chief complaint of Anemia (20 May 2020 19:29)      SUBJECTIVE / OVERNIGHT EVENTS:        MEDICATIONS  (STANDING):  ferrous    sulfate 325 milliGRAM(s) Oral daily  lidocaine 1% Injectable 20 milliLiter(s) Local Injection once    MEDICATIONS  (PRN):  ALBUTerol    90 MICROgram(s) HFA Inhaler 2 Puff(s) Inhalation every 6 hours PRN Shortness of Breath and/or Wheezing  benzocaine 15 mG/menthol 3.6 mG (Sugar-Free) Lozenge 1 Lozenge Oral every 6 hours PRN Sore Throat      CAPILLARY BLOOD GLUCOSE        I&O's Summary    19 May 2020 07:01  -  20 May 2020 07:00  --------------------------------------------------------  IN: 880 mL / OUT: 500 mL / NET: 380 mL    20 May 2020 07:01  -  21 May 2020 06:45  --------------------------------------------------------  IN: 740 mL / OUT: 1400 mL / NET: -660 mL        PHYSICAL EXAM:  Vital Signs Last 24 Hrs  T(C): 36.8 (20 May 2020 21:51), Max: 36.8 (20 May 2020 21:51)  T(F): 98.2 (20 May 2020 21:51), Max: 98.2 (20 May 2020 21:51)  HR: 61 (20 May 2020 21:51) (61 - 68)  BP: 122/70 (20 May 2020 21:51) (113/74 - 122/70)  BP(mean): --  RR: 17 (20 May 2020 21:51) (17 - 18)  SpO2: 100% (20 May 2020 21:51) (100% - 100%)    CONSTITUTIONAL: NAD, well-developed  RESPIRATORY: Normal respiratory effort; lungs are clear to auscultation bilaterally  CARDIOVASCULAR: Regular rate and rhythm, normal S1 and S2, no murmur/rub/gallop; No lower extremity edema; Peripheral pulses are 2+ bilaterally  ABDOMEN: Nontender to palpation, normoactive bowel sounds, no rebound/guarding; No hepatosplenomegaly  MUSCLOSKELETAL: no clubbing or cyanosis of digits; no joint swelling or tenderness to palpation  NEURO: CN 2-12 grossly intact, moves all limbs spontaneously  PSYCH: A+O to person, place, and time; affect appropriate    LABS:                        7.9    4.98  )-----------( 96       ( 20 May 2020 06:48 )             26.4     05-20    142  |  111<H>  |  13  ----------------------------<  90  3.5   |  24  |  0.82    Ca    7.5<L>      20 May 2020 06:48  Phos  2.6     05-20  Mg     2.0     05-20    TPro  4.7<L>  /  Alb  2.3<L>  /  TBili  2.3<H>  /  DBili  x   /  AST  35<H>  /  ALT  35<H>  /  AlkPhos  84  05-20                RADIOLOGY & ADDITIONAL TESTS:  Results Reviewed:   Imaging Personally Reviewed:  Electrocardiogram Personally Reviewed:    COORDINATION OF CARE:  Care Discussed with Consultants/Other Providers [Y/N]:  Prior or Outpatient Records Reviewed [Y/N]: PROGRESS NOTE:     Patient is a 49y old  Female who presents with a chief complaint of Anemia (20 May 2020 19:29)      SUBJECTIVE / OVERNIGHT EVENTS: patient w/ no subjective complaints, states improvement in her leg/abd swelling. No n/v, abd pain, cp. NAEO.         MEDICATIONS  (STANDING):  ferrous    sulfate 325 milliGRAM(s) Oral daily  lidocaine 1% Injectable 20 milliLiter(s) Local Injection once    MEDICATIONS  (PRN):  ALBUTerol    90 MICROgram(s) HFA Inhaler 2 Puff(s) Inhalation every 6 hours PRN Shortness of Breath and/or Wheezing  benzocaine 15 mG/menthol 3.6 mG (Sugar-Free) Lozenge 1 Lozenge Oral every 6 hours PRN Sore Throat      CAPILLARY BLOOD GLUCOSE        I&O's Summary    19 May 2020 07:01  -  20 May 2020 07:00  --------------------------------------------------------  IN: 880 mL / OUT: 500 mL / NET: 380 mL    20 May 2020 07:01  -  21 May 2020 06:45  --------------------------------------------------------  IN: 740 mL / OUT: 1400 mL / NET: -660 mL        PHYSICAL EXAM:  Vital Signs Last 24 Hrs  T(C): 36.8 (20 May 2020 21:51), Max: 36.8 (20 May 2020 21:51)  T(F): 98.2 (20 May 2020 21:51), Max: 98.2 (20 May 2020 21:51)  HR: 61 (20 May 2020 21:51) (61 - 68)  BP: 122/70 (20 May 2020 21:51) (113/74 - 122/70)  BP(mean): --  RR: 17 (20 May 2020 21:51) (17 - 18)  SpO2: 100% (20 May 2020 21:51) (100% - 100%)    General: well appearing female, sitting up in bed  HEENT: neck supple  Cardiovascular: normal s1 s2 no grm  Respiratory: cta b/l   Abdominal: soft, distended non tense, no ttp  Extremities: 4+ pitting edema   Neurologic: non focal  Psych: awake alert answering questions appropriately    LABS:                        7.9    4.98  )-----------( 96       ( 20 May 2020 06:48 )             26.4     05-20    142  |  111<H>  |  13  ----------------------------<  90  3.5   |  24  |  0.82    Ca    7.5<L>      20 May 2020 06:48  Phos  2.6     05-20  Mg     2.0     05-20    TPro  4.7<L>  /  Alb  2.3<L>  /  TBili  2.3<H>  /  DBili  x   /  AST  35<H>  /  ALT  35<H>  /  AlkPhos  84  05-20

## 2020-05-21 NOTE — PROGRESS NOTE ADULT - PROBLEM SELECTOR PLAN 4
Pt originally with hypotension to the 90s/40s.  Suspect in setting of intravascular depletion and severe anemia. Improved s/p 3u PRBC; resolved

## 2020-05-21 NOTE — PROGRESS NOTE ADULT - PROBLEM SELECTOR PLAN 1
Anasarca (significant LLE and abdominal swelling) in the setting of decreased oncotic pressure/ hypoalbuminemia and mild hypoproteinemia  - MR Liver show hepatic lesions c/w hemangioma and Perivascular soft tissue encasing celiac axis/SMA  - IR unable to bx given anatomical location; poss surg c/s for bx  - s/p therapeutic tap w/ 10.6L; 100ml 25% albumin q6 x 3 doses  - Lasix 40 x1  - CT abd IV contrast- RP soft tissue density, hepatic lobe lesion  - total NC <250; SAAG-4.5, LDH elevated at 124, Protein 1.6  - cytopath shows no malignant cells  - Duplex w/ no DVT Anasarca (significant LLE and abdominal swelling) in the setting of decreased oncotic pressure/ hypoalbuminemia and mild hypoproteinemia  - MR Liver show hepatic lesions c/w hemangioma and Perivascular soft tissue encasing celiac axis/SMA  - surg c/s; rec onc for poss PET, GI for EUS bx; appreciate surg recs   - s/p therapeutic tap w/ 10.6L; 100ml 25% albumin q6 x 3 doses  - total NC <250; SAAG-4.5, LDH elevated at 124, Protein 1.6  - cytopath shows no malignant cells  - Duplex w/ no DVT Anasarca (significant LLE and abdominal swelling) in the setting of decreased oncotic pressure/ hypoalbuminemia and mild hypoproteinemia  - MR Liver show hepatic lesions c/w hemangioma and Perivascular soft tissue encasing celiac axis/SMA  - GI c/s poss endoscopy w/ EUS for bx  - f/u TTG/Total IgA, vitamin D level, stool antitrypsin, quant gold from ascitic fluid   - cytopath shows no malignant cells  - f/u medical records from [facility], #in provider handoff, forms in chart  - Duplex w/ no DVT

## 2020-05-21 NOTE — CONSULT NOTE ADULT - SUBJECTIVE AND OBJECTIVE BOX
Chief Complaint:  Patient is a 49y old  Female who presents with a chief complaint of Anemia (21 May 2020 11:14)      HPI:  This is a 49 year old female with history of pancreatic tumor s/p Whipple resection in  who presents with abdominal distention for a few weeks. She has also been having worsening pedal edema for a few months.  She does not recall the exact pathology of her pancreatic tumor but reports it was "benign." She does not take medication at this time.   She underwent therapeutic paracentesis of 10 liters on this admission after being noted to be diffusely volume overloaded.   She denies abdominal pain or diarrhea but does get occaisional steatorrhea with fatty meals. She does not take pancreatic enzyme supplements.    Allergies:  IV Contrast (Unknown)  Seafood (Anaphylaxis)      Home Medications:    Hospital Medications:  ALBUTerol    90 MICROgram(s) HFA Inhaler 2 Puff(s) Inhalation every 6 hours PRN  benzocaine 15 mG/menthol 3.6 mG (Sugar-Free) Lozenge 1 Lozenge Oral every 6 hours PRN  ferrous    sulfate 325 milliGRAM(s) Oral daily  furosemide   Injectable 40 milliGRAM(s) IV Push daily  lidocaine 1% Injectable 20 milliLiter(s) Local Injection once      PMHX/PSHX:  Anemia  Asthma  No pertinent past medical history  History of pancreatic surgery      Family history:  FH: ovarian cancer  FH: hypertension  FH: type 2 diabetes      Social History:     ROS:     General:  No wt loss, fevers, chills, night sweats, fatigue,   Eyes:  Good vision, no reported pain  ENT:  No sore throat, pain, runny nose, dysphagia  CV:  No pain, palpitations, hypo/hypertension  Resp:  No dyspnea, cough, tachypnea, wheezing  GI:  See HPI  :  No pain, bleeding, incontinence, nocturia  Muscle:  No pain, weakness  Neuro:  No weakness, tingling, memory problems  Psych:  No fatigue, insomnia, mood problems, depression  Endocrine:  No polyuria, polydipsia, cold/heat intolerance  Heme:  No petechiae, ecchymosis, easy bruisability  Skin:  No rash, edema      PHYSICAL EXAM:     GENERAL:  Appears stated age  HEENT:  NC/AT,  conjunctivae clear and pink,  no JVD  CHEST:  Full & symmetric excursion, no increased effort, breath sounds clear  HEART:  Regular rhythm, S1, S2, no murmur/rub/S3/S4, no abdominal bruit, no edema  ABDOMEN:  Soft, non-tender, distended, normoactive bowel sounds,  no masses , no hepatosplenomegaly  EXTREMITIES:  marked pedal edema  SKIN:  No rash/erythema/ecchymoses/petechiae/wounds/abscess/warm/dry  NEURO:  Alert, oriented    Vital Signs:  Vital Signs Last 24 Hrs  T(C): 36.4 (21 May 2020 07:11), Max: 36.8 (20 May 2020 21:51)  T(F): 97.6 (21 May 2020 07:11), Max: 98.2 (20 May 2020 21:51)  HR: 66 (21 May 2020 11:11) (60 - 67)  BP: 116/65 (21 May 2020 11:11) (113/74 - 122/70)  BP(mean): --  RR: 16 (21 May 2020 07:11) (16 - 18)  SpO2: 98% (21 May 2020 07:11) (98% - 100%)  Daily     Daily Weight in k.7 (20 May 2020 16:18)    LABS:                        8.1    6.15  )-----------( 98       ( 21 May 2020 06:45 )             26.4     05-21    143  |  110<H>  |  9   ----------------------------<  91  3.6   |  26  |  0.68    Ca    7.3<L>      21 May 2020 06:45  Phos  2.7     05-21  Mg     1.9     05-21    TPro  4.5<L>  /  Alb  2.0<L>  /  TBili  1.8<H>  /  DBili  x   /  AST  40<H>  /  ALT  40<H>  /  AlkPhos  93  05-21    LIVER FUNCTIONS - ( 21 May 2020 06:45 )  Alb: 2.0 g/dL / Pro: 4.5 g/dL / ALK PHOS: 93 u/L / ALT: 40 u/L / AST: 40 u/L / GGT: x                   Imaging:

## 2020-05-21 NOTE — PROGRESS NOTE ADULT - PROBLEM SELECTOR PLAN 2
Pt with pulmonary nodules not on CT scan, could possibly be malignancy  - CT chest w/ pleural effusion b/l patchy opacities  - Unable to contact PMD for outpatient malignancy workup, will cont to contact to arrange reliable f/u Pt with pulmonary nodules not on CT scan, could possibly be malignancy  - CT chest w/ pleural effusion b/l patchy opacities  - Left a voicemail for PMD for outpatient malignancy workup to arrange reliable f/u

## 2020-05-21 NOTE — CONSULT NOTE ADULT - ASSESSMENT
Impression:     1. Retroperitoneal mass: ? lymphoma vs. TB vs. mass forming pancreatitis/mesenteritis.    2. Volume overload/ascites: Appears multifactorial given elevated SAAG to 1.6 on paracentesis but also with hypoalbuminemia. She does not appear to have protein losing enteropathy but it is possible.    3. Abnormal liver tests/possible portal hypertension     Recommendation:  -would consider EGD and EUS however must consider appropriate timing given patient is still grossly very volume overloaded and receiving IV diuretics.   -follow up quant gold, can add on ADA to ascites studies Impression:     1. Retroperitoneal mass: ? lymphoma vs. TB vs. mass forming pancreatitis/mesenteritis.    2. Volume overload/ascites: Appears multifactorial given elevated SAAG to 1.6 on paracentesis but also with hypoalbuminemia. She does not appear to have protein losing enteropathy but it is possible.    3. Abnormal liver tests/possible portal hypertension     4. Iron deficiency anemia, thrombocytopenia.    Recommendation:  -would consider EGD and EUS however must consider appropriate timing given patient is still grossly very volume overloaded and receiving IV diuretics.   -follow up quant gold, can add on ADA to ascites studies Impression:     1. Retroperitoneal mass: ? lymphoma vs. TB vs. mass forming pancreatitis/mesenteritis.    2. Volume overload/ascites: Appears multifactorial given elevated SAAG to 1.6 on paracentesis but also with hypoalbuminemia. She does not appear to have protein losing enteropathy but it is possible.    3. Abnormal liver tests/possible portal hypertension     4. Iron deficiency anemia, thrombocytopenia.    Recommendation:  -would consider EGD and EUS however must consider appropriate timing given patient is still grossly very volume overloaded and receiving IV diuretics.   -follow up quant gold, can add on ADA to ascites studies  -agree it would be helpful to obtain collateral information from the surgeon.  -check TTG/Total IgA Impression:     1. Retroperitoneal mass: ? lymphoma vs. TB vs. mass forming pancreatitis/mesenteritis.    2. Volume overload/ascites: Appears multifactorial given elevated SAAG to 1.6 on paracentesis but also with hypoalbuminemia. She does not appear to have protein losing enteropathy but it is possible.    3. Abnormal liver tests/possible portal hypertension     4. Iron deficiency anemia, thrombocytopenia. Suggestive of malabsorbtion.    Recommendation:  -would consider EGD and EUS however must consider appropriate timing given patient is still grossly very volume overloaded and receiving IV diuretics. Should also compare to old imaging as this lesion may be chronic.  -follow up quant gold, can add on ADA to ascites studies  -agree it would be helpful to obtain collateral information from the surgeon.  -check TTG/Total IgA Impression:     1. Retroperitoneal mass: ? lymphoma vs. TB vs. mass forming pancreatitis/mesenteritis.    2. Volume overload/ascites: Appears multifactorial given elevated SAAG to 1.6 on paracentesis but also with hypoalbuminemia. She does not appear to have protein losing enteropathy but it is possible.    3. Abnormal liver tests/possible portal hypertension     4. Iron deficiency anemia, thrombocytopenia. Suggestive of malabsorbtion.    Recommendation:  -would consider EGD and EUS however must consider appropriate timing given patient is still grossly very volume overloaded and receiving IV diuretics. Should also compare to old imaging as this lesion may be chronic.  -follow up quant gold, can add on ADA to ascites studies  -agree it would be helpful to obtain collateral information from the surgeon.  -check TTG/Total IgA, vitamin D level Impression:     1. Retroperitoneal mass: ? lymphoma vs. TB vs. mass forming pancreatitis/mesenteritis.    2. Volume overload/ascites: Appears multifactorial given elevated SAAG to 1.6 on paracentesis but also with hypoalbuminemia. She does not appear to have protein losing enteropathy but it is possible.    3. Abnormal liver tests/possible portal hypertension     4. Iron deficiency anemia, thrombocytopenia. Suggestive of malabsorbtion.    Recommendation:  -would consider EGD and EUS however must consider appropriate timing given patient is still grossly very volume overloaded and receiving IV diuretics. Should also compare to old imaging as this lesion may be chronic.  -follow up quant gold, can add on ADA to ascites studies  -agree it would be helpful to obtain collateral information from the surgeon.  -check TTG/Total IgA, vitamin D level  -check fecal fat/elastase Impression:     1. Retroperitoneal mass: ? lymphoma vs. TB vs. mass forming pancreatitis/mesenteritis.    2. Volume overload/ascites: Possible protein losing enteropathy in the setting of retroperitoneal process. SAAG is elevated but overall picture seems more consistent with low oncotic state.    3. Abnormal liver tests/possible portal hypertension     4. Iron deficiency anemia, thrombocytopenia. Suggestive of malabsorbtion.    Recommendation:  -would consider EGD with instestinal biopsy and EUS however must consider appropriate timing given patient is still grossly very volume overloaded and receiving IV diuretics. Should also compare to old imaging as this lesion may be chronic.  -follow up quant gold, can add on ADA to ascites studies  -agree it would be helpful to obtain collateral information from the surgeon.  -check TTG/Total IgA, vitamin D level  -check stool antitrypsin

## 2020-05-21 NOTE — PROGRESS NOTE ADULT - PROBLEM SELECTOR PLAN 3
Pt presented w/ anemia to 3.8 and chronic fatigue. Microcytic. Now improving s/p 4u PRBCs. Suspect iron deficiency vs anemia of chronic illness. Low suspicion for GI Bleed. Concerning for malignancy given she had pancreatic mass (unclear hx). Low suspicion for hemolysis.  - iron lvl decreased; supplemented  - Hgb stable at 9  - monitor HgB and transfuse if <7 Pt presented w/ anemia to 3.8 and chronic fatigue. Microcytic. Now improving s/p 4u PRBCs. Suspect iron deficiency vs anemia of chronic illness. Low suspicion for GI Bleed. Concerning for malignancy given she had pancreatic mass (unclear hx). Low suspicion for hemolysis.  - iron lvl decreased; supplemented  - Hgb stable   - monitor HgB and transfuse if <7

## 2020-05-21 NOTE — PROGRESS NOTE ADULT - PROBLEM SELECTOR PLAN 5
suspect pre-renal SCOTT in the setting of intravascular depletion/third spacing and hypotension; resolved  - monitor Cr and avoid nephrotoxic agents/ renally dose meds

## 2020-05-21 NOTE — PROGRESS NOTE ADULT - SUBJECTIVE AND OBJECTIVE BOX
SURGICAL ONCOLOGY / D TEAM SURGERY  Pager: [phone]      INTERVAL EVENTS/SUBJECTIVE:   Denies pain.   No acute events overnight.   ______________________________________________  OBJECTIVE:   T(C): 36.4 (05-21-20 @ 07:11), Max: 36.8 (05-20-20 @ 21:51)  HR: 66 (05-21-20 @ 11:11) (60 - 68)  BP: 116/65 (05-21-20 @ 11:11) (113/74 - 122/70)  RR: 16 (05-21-20 @ 07:11) (16 - 18)  SpO2: 98% (05-21-20 @ 07:11) (98% - 100%)  Wt(kg): --  CAPILLARY BLOOD GLUCOSE        I&O's Detail    20 May 2020 07:01  -  21 May 2020 07:00  --------------------------------------------------------  IN:    Oral Fluid: 740 mL  Total IN: 740 mL    OUT:    Voided: 1400 mL  Total OUT: 1400 mL    Total NET: -660 mL          Physical exam:  Gen: NAD  Abdomen: Softly distended. Nontender  ______________________________________________  LABS:  CBC Full  -  ( 21 May 2020 06:45 )  WBC Count : 6.15 K/uL  RBC Count : 3.21 M/uL  Hemoglobin : 8.1 g/dL  Hematocrit : 26.4 %  Platelet Count - Automated : 98 K/uL  Mean Cell Volume : 82.2 fL  Mean Cell Hemoglobin : 25.2 pg  Mean Cell Hemoglobin Concentration : 30.7 %  Auto Neutrophil # : x  Auto Lymphocyte # : x  Auto Monocyte # : x  Auto Eosinophil # : x  Auto Basophil # : x  Auto Neutrophil % : x  Auto Lymphocyte % : x  Auto Monocyte % : x  Auto Eosinophil % : x  Auto Basophil % : x    05-21    143  |  110<H>  |  9   ----------------------------<  91  3.6   |  26  |  0.68    Ca    7.3<L>      21 May 2020 06:45  Phos  2.7     05-21  Mg     1.9     05-21    TPro  4.5<L>  /  Alb  2.0<L>  /  TBili  1.8<H>  /  DBili  x   /  AST  40<H>  /  ALT  40<H>  /  AlkPhos  93  05-21    _____________________________________________  RADIOLOGY:  CT Abdomen and Pelvis w/ IV Cont (05.17.20 @ 18:45)   CHEST:     LUNGS AND LARGE AIRWAYS: Patent central airways. Bilateral patchy airspace opacity. Passive atelectasis of both lung bases.  PLEURA: Moderate right and small left pleural effusion.  VESSELS: Within normal limits.  HEART: Heart size is normal. No pericardial effusion.  MEDIASTINUM ANDHILA: No lymphadenopathy.  CHEST WALL AND LOWER NECK: Tiny left thyroid nodule measuring 5 mm.    ABDOMEN AND PELVIS:    LIVER: Indeterminate hyperenhancing lesions in the right hepatic lobe measuring 1.2 cm and 0.5 cm, respectively.  BILE DUCTS: Normal caliber.  GALLBLADDER: Within normal limits.  SPLEEN: Within normal limits.  PANCREAS: Status post Whipple. The native pancreas is atrophic, without ductal dilatation. Region of the pancreatic jejunal and hepaticojejunal anastomoses are unremarkable.  ADRENALS: Within normal limits.  KIDNEYS/URETERS: Bilateral hydronephrosis versus parapelvic cysts.     BLADDER: Within normal limits.  REPRODUCTIVE ORGANS: Uterus and bilateral adnexa are within normal limits.    BOWEL: Postoperative changes. Nobowel obstruction. Appendix is not visualized  PERITONEUM: Large amount of abdominal pelvic ascites.  VESSELS: Within normal limits.  RETROPERITONEUM/LYMPH NODES: Confluent retroperitoneal soft tissue density surrounding the celiac axis and SMA.    ABDOMINAL WALL: Anasarca.  BONES: Within normal limits.    IMPRESSION:     CT findings of pulmonary infection/inflammation compatible with many entities (C19V-2).    Confluent retroperitoneal soft tissue density surrounding the celiac axis and SMA, concerning for malignancy.    Indeterminate hyperenhancing lesions in the right hepatic lobe largest measuring 1.2 cm.    Pleural effusions, anasarca, and large abdominal pelvic ascites.

## 2020-05-22 DIAGNOSIS — R19.00 INTRA-ABDOMINAL AND PELVIC SWELLING, MASS AND LUMP, UNSPECIFIED SITE: ICD-10-CM

## 2020-05-22 LAB
ALBUMIN SERPL ELPH-MCNC: 2.3 G/DL — LOW (ref 3.3–5)
ALP SERPL-CCNC: 125 U/L — HIGH (ref 40–120)
ALT FLD-CCNC: 50 U/L — HIGH (ref 4–33)
ANION GAP SERPL CALC-SCNC: 8 MMO/L — SIGNIFICANT CHANGE UP (ref 7–14)
ANTIBODY ID 1_1: SIGNIFICANT CHANGE UP
AST SERPL-CCNC: 52 U/L — HIGH (ref 4–32)
BILIRUB SERPL-MCNC: 2.2 MG/DL — HIGH (ref 0.2–1.2)
BLD GP AB SCN SERPL QL: POSITIVE — SIGNIFICANT CHANGE UP
BUN SERPL-MCNC: 7 MG/DL — SIGNIFICANT CHANGE UP (ref 7–23)
CALCIUM SERPL-MCNC: 7.5 MG/DL — LOW (ref 8.4–10.5)
CHLORIDE SERPL-SCNC: 103 MMOL/L — SIGNIFICANT CHANGE UP (ref 98–107)
CO2 SERPL-SCNC: 30 MMOL/L — SIGNIFICANT CHANGE UP (ref 22–31)
CREAT SERPL-MCNC: 0.74 MG/DL — SIGNIFICANT CHANGE UP (ref 0.5–1.3)
DAT POLY-SP REAG RBC QL: NEGATIVE — SIGNIFICANT CHANGE UP
GLUCOSE SERPL-MCNC: 89 MG/DL — SIGNIFICANT CHANGE UP (ref 70–99)
HCT VFR BLD CALC: 29.2 % — LOW (ref 34.5–45)
HGB BLD-MCNC: 8.7 G/DL — LOW (ref 11.5–15.5)
IGA FLD-MCNC: 504 MG/DL — HIGH (ref 70–400)
IGG FLD-MCNC: 1373 MG/DL — SIGNIFICANT CHANGE UP (ref 700–1600)
IGM SERPL-MCNC: 122 MG/DL — SIGNIFICANT CHANGE UP (ref 40–230)
MAGNESIUM SERPL-MCNC: 1.8 MG/DL — SIGNIFICANT CHANGE UP (ref 1.6–2.6)
MCHC RBC-ENTMCNC: 24.7 PG — LOW (ref 27–34)
MCHC RBC-ENTMCNC: 29.8 % — LOW (ref 32–36)
MCV RBC AUTO: 83 FL — SIGNIFICANT CHANGE UP (ref 80–100)
NRBC # FLD: 0 K/UL — SIGNIFICANT CHANGE UP (ref 0–0)
PHOSPHATE SERPL-MCNC: 3 MG/DL — SIGNIFICANT CHANGE UP (ref 2.5–4.5)
PLATELET # BLD AUTO: 123 K/UL — LOW (ref 150–400)
PMV BLD: SIGNIFICANT CHANGE UP FL (ref 7–13)
POTASSIUM SERPL-MCNC: 4.1 MMOL/L — SIGNIFICANT CHANGE UP (ref 3.5–5.3)
POTASSIUM SERPL-SCNC: 4.1 MMOL/L — SIGNIFICANT CHANGE UP (ref 3.5–5.3)
PROT SERPL-MCNC: 5.4 G/DL — LOW (ref 6–8.3)
RBC # BLD: 3.52 M/UL — LOW (ref 3.8–5.2)
RBC # FLD: 27 % — HIGH (ref 10.3–14.5)
RH IG SCN BLD-IMP: POSITIVE — SIGNIFICANT CHANGE UP
SODIUM SERPL-SCNC: 141 MMOL/L — SIGNIFICANT CHANGE UP (ref 135–145)
WBC # BLD: 8.77 K/UL — SIGNIFICANT CHANGE UP (ref 3.8–10.5)
WBC # FLD AUTO: 8.77 K/UL — SIGNIFICANT CHANGE UP (ref 3.8–10.5)

## 2020-05-22 PROCEDURE — 99232 SBSQ HOSP IP/OBS MODERATE 35: CPT | Mod: GC

## 2020-05-22 PROCEDURE — 86077 PHYS BLOOD BANK SERV XMATCH: CPT

## 2020-05-22 PROCEDURE — 99233 SBSQ HOSP IP/OBS HIGH 50: CPT | Mod: GC

## 2020-05-22 RX ORDER — FUROSEMIDE 40 MG
40 TABLET ORAL EVERY 12 HOURS
Refills: 0 | Status: DISCONTINUED | OUTPATIENT
Start: 2020-05-22 | End: 2020-05-26

## 2020-05-22 RX ORDER — ENOXAPARIN SODIUM 100 MG/ML
40 INJECTION SUBCUTANEOUS AT BEDTIME
Refills: 0 | Status: DISCONTINUED | OUTPATIENT
Start: 2020-05-22 | End: 2020-05-26

## 2020-05-22 RX ADMIN — Medication 325 MILLIGRAM(S): at 11:05

## 2020-05-22 RX ADMIN — ENOXAPARIN SODIUM 40 MILLIGRAM(S): 100 INJECTION SUBCUTANEOUS at 22:34

## 2020-05-22 RX ADMIN — Medication 40 MILLIGRAM(S): at 17:08

## 2020-05-22 RX ADMIN — Medication 40 MILLIGRAM(S): at 05:14

## 2020-05-22 NOTE — PROGRESS NOTE ADULT - ASSESSMENT
49 year-old female with history of pancreatic mass (benign as per patient) s/p Whipple in 2011 who presented to the ED sent in from her PCP office due to abnormal labs and weakness. Patient found with microcytic anemia to 3.6 s/p PRBC transfusion with now H/H stable. Found a RP mass on imaging pending work up. Complicated by ascites/anasarca s/p diagnostic (5/16) and therapeutic paracentesis (5/19) requiring diuresis.

## 2020-05-22 NOTE — PROGRESS NOTE ADULT - PROBLEM SELECTOR PLAN 3
Pt with pulmonary nodules not on CT scan, could possibly be malignancy  - CT chest w/ pleural effusion b/l patchy opacities  - Left a voicemail for PMD for outpatient malignancy workup to arrange reliable f/u Pt with pulmonary nodules not on CT scan, could possibly be malignancy  - CT chest w/ pleural effusion b/l patchy opacities

## 2020-05-22 NOTE — PROGRESS NOTE ADULT - PROBLEM SELECTOR PLAN 4
Pt presented w/ anemia to 3.8 and chronic fatigue. Microcytic. Now improving s/p 4u PRBCs. Suspect iron deficiency vs anemia of chronic illness. Low suspicion for GI Bleed. Concerning for malignancy given she had pancreatic mass (unclear hx). Low suspicion for hemolysis.  - iron lvl decreased; supplemented  - Hgb stable   - monitor HgB and transfuse if <7

## 2020-05-22 NOTE — PROGRESS NOTE ADULT - SUBJECTIVE AND OBJECTIVE BOX
Department of Internal Medicine  Oswaldo Coulter M.D. | PGY-2  Pager: [phone] ([place]), [phone] ([place])        Patient is a 49y old  Female who presents with a chief complaint of Anemia (21 May 2020 14:08)      SUBJECTIVE / OVERNIGHT EVENTS:  ADDITIONAL REVIEW OF SYSTEMS:    MEDICATIONS  (STANDING):  ferrous    sulfate 325 milliGRAM(s) Oral daily  furosemide   Injectable 40 milliGRAM(s) IV Push daily  lidocaine 1% Injectable 20 milliLiter(s) Local Injection once    MEDICATIONS  (PRN):  ALBUTerol    90 MICROgram(s) HFA Inhaler 2 Puff(s) Inhalation every 6 hours PRN Shortness of Breath and/or Wheezing  benzocaine 15 mG/menthol 3.6 mG (Sugar-Free) Lozenge 1 Lozenge Oral every 6 hours PRN Sore Throat      CAPILLARY BLOOD GLUCOSE        I&O's Summary    21 May 2020 07:01  -  22 May 2020 07:00  --------------------------------------------------------  IN: 940 mL / OUT: 3200 mL / NET: -2260 mL        PHYSICAL EXAM:  Vital Signs Last 24 Hrs  T(C): 36.5 (22 May 2020 05:12), Max: 36.8 (21 May 2020 21:23)  T(F): 97.7 (22 May 2020 05:12), Max: 98.2 (21 May 2020 21:23)  HR: 62 (22 May 2020 05:12) (60 - 77)  BP: 126/50 (22 May 2020 05:12) (116/65 - 129/67)  BP(mean): --  RR: 18 (22 May 2020 05:12) (16 - 18)  SpO2: 100% (22 May 2020 05:12) (98% - 100%)  CONSTITUTIONAL: NAD, well-developed, well-groomed  EYES: PERRLA; conjunctiva and sclera clear  ENMT: Moist oral mucosa, no pharyngeal injection or exudates; normal dentition  NECK: Supple, no palpable masses; no thyromegaly  RESPIRATORY: Normal respiratory effort; lungs are clear to auscultation bilaterally  CARDIOVASCULAR: Regular rate and rhythm, normal S1 and S2, no murmur/rub/gallop; No lower extremity edema; Peripheral pulses are 2+ bilaterally  ABDOMEN: Nontender to palpation, normoactive bowel sounds, no rebound/guarding; No hepatosplenomegaly  MUSCULOSKELETAL:  Normal gait; no clubbing or cyanosis of digits; no joint swelling or tenderness to palpation  PSYCH: A+O to person, place, and time; affect appropriate  NEUROLOGY: CN 2-12 are intact and symmetric; no gross sensory deficits   SKIN: No rashes; no palpable lesions    LABS:                        8.1    6.15  )-----------( 98       ( 21 May 2020 06:45 )             26.4     05-21    143  |  110<H>  |  9   ----------------------------<  91  3.6   |  26  |  0.68    Ca    7.3<L>      21 May 2020 06:45  Phos  2.7     05-21  Mg     1.9     05-21    TPro  4.5<L>  /  Alb  2.0<L>  /  TBili  1.8<H>  /  DBili  x   /  AST  40<H>  /  ALT  40<H>  /  AlkPhos  93  05-21              Culture - Acid Fast - Body Fluid w/Smear (collected 20 May 2020 22:53)  Source: .Body Fluid        RADIOLOGY & ADDITIONAL TESTS:  Results Reviewed:   Imaging Personally Reviewed:  Electrocardiogram Personally Reviewed:    COORDINATION OF CARE:  Care Discussed with Consultants/Other Providers [Y/N]:  Prior or Outpatient Records Reviewed [Y/N]: Department of Internal Medicine  Oswaldo Coulter M.D. | PGY-2  Pager: [phone] ([facility]), [phone] ([facility])        Patient is a 49y old  Female who presents with a chief complaint of Anemia (21 May 2020 14:08)      SUBJECTIVE / OVERNIGHT EVENTS:  - No acute events overnight  - Reports feeling "lighter" and urinating frequently, abdominal discomfort  ADDITIONAL REVIEW OF SYSTEMS:    MEDICATIONS  (STANDING):  ferrous    sulfate 325 milliGRAM(s) Oral daily  furosemide   Injectable 40 milliGRAM(s) IV Push daily  lidocaine 1% Injectable 20 milliLiter(s) Local Injection once    MEDICATIONS  (PRN):  ALBUTerol    90 MICROgram(s) HFA Inhaler 2 Puff(s) Inhalation every 6 hours PRN Shortness of Breath and/or Wheezing  benzocaine 15 mG/menthol 3.6 mG (Sugar-Free) Lozenge 1 Lozenge Oral every 6 hours PRN Sore Throat      CAPILLARY BLOOD GLUCOSE        I&O's Summary    21 May 2020 07:01  -  22 May 2020 07:00  --------------------------------------------------------  IN: 940 mL / OUT: 3200 mL / NET: -2260 mL        PHYSICAL EXAM:  Vital Signs Last 24 Hrs  T(C): 36.5 (22 May 2020 05:12), Max: 36.8 (21 May 2020 21:23)  T(F): 97.7 (22 May 2020 05:12), Max: 98.2 (21 May 2020 21:23)  HR: 62 (22 May 2020 05:12) (60 - 77)  BP: 126/50 (22 May 2020 05:12) (116/65 - 129/67)  BP(mean): --  RR: 18 (22 May 2020 05:12) (16 - 18)  SpO2: 100% (22 May 2020 05:12) (98% - 100%)  CONSTITUTIONAL: NAD, well-developed, well-groomed  EYES: PERRLA; conjunctiva and sclera clear  ENMT: Moist oral mucosa, no pharyngeal injection or exudates; normal dentition  NECK: Supple, no palpable masses; no thyromegaly  RESPIRATORY: Normal respiratory effort; lungs are clear to auscultation bilaterally  CARDIOVASCULAR: Regular rate and rhythm, normal S1 and S2, no murmur/rub/gallop; No lower extremity edema; Peripheral pulses are 2+ bilaterally  ABDOMEN: Nontender to palpation, normoactive bowel sounds, no rebound/guarding; No hepatosplenomegaly  MUSCULOSKELETAL:  Normal gait; no clubbing or cyanosis of digits; no joint swelling or tenderness to palpation  PSYCH: A+O to person, place, and time; affect appropriate  NEUROLOGY: CN 2-12 are intact and symmetric; no gross sensory deficits   SKIN: No rashes; no palpable lesions    LABS:                        8.1    6.15  )-----------( 98       ( 21 May 2020 06:45 )             26.4     05-21    143  |  110<H>  |  9   ----------------------------<  91  3.6   |  26  |  0.68    Ca    7.3<L>      21 May 2020 06:45  Phos  2.7     05-21  Mg     1.9     05-21    TPro  4.5<L>  /  Alb  2.0<L>  /  TBili  1.8<H>  /  DBili  x   /  AST  40<H>  /  ALT  40<H>  /  AlkPhos  93  05-21              Culture - Acid Fast - Body Fluid w/Smear (collected 20 May 2020 22:53)  Source: .Body Fluid        RADIOLOGY & ADDITIONAL TESTS:  Results Reviewed:   Imaging Personally Reviewed:  Electrocardiogram Personally Reviewed:    COORDINATION OF CARE:  Care Discussed with Consultants/Other Providers [Y/N]:  Prior or Outpatient Records Reviewed [Y/N]: Department of Internal Medicine  Oswaldo Coulter M.D. | PGY-2  Pager: [phone] ([place]), [phone] ([facility])        Patient is a 49y old  Female who presents with a chief complaint of Anemia (21 May 2020 14:08)      SUBJECTIVE / OVERNIGHT EVENTS:  - No acute events overnight  - Reports feeling "lighter" and urinating frequently, abdominal discomfort  ADDITIONAL REVIEW OF SYSTEMS:    MEDICATIONS  (STANDING):  ferrous    sulfate 325 milliGRAM(s) Oral daily  furosemide   Injectable 40 milliGRAM(s) IV Push daily  lidocaine 1% Injectable 20 milliLiter(s) Local Injection once    MEDICATIONS  (PRN):  ALBUTerol    90 MICROgram(s) HFA Inhaler 2 Puff(s) Inhalation every 6 hours PRN Shortness of Breath and/or Wheezing  benzocaine 15 mG/menthol 3.6 mG (Sugar-Free) Lozenge 1 Lozenge Oral every 6 hours PRN Sore Throat      CAPILLARY BLOOD GLUCOSE        I&O's Summary    21 May 2020 07:01  -  22 May 2020 07:00  --------------------------------------------------------  IN: 940 mL / OUT: 3200 mL / NET: -2260 mL        PHYSICAL EXAM:  Vital Signs Last 24 Hrs  T(C): 36.5 (22 May 2020 05:12), Max: 36.8 (21 May 2020 21:23)  T(F): 97.7 (22 May 2020 05:12), Max: 98.2 (21 May 2020 21:23)  HR: 62 (22 May 2020 05:12) (60 - 77)  BP: 126/50 (22 May 2020 05:12) (116/65 - 129/67)  BP(mean): --  RR: 18 (22 May 2020 05:12) (16 - 18)  SpO2: 100% (22 May 2020 05:12) (98% - 100%)      CONSTITUTIONAL: NAD  EYES: conjunctiva and sclera clear  NECK: Supple no JVD   RESPIRATORY: Normal respiratory effort; lungs are clear to auscultation bilaterally  CARDIOVASCULAR: Regular rate and rhythm, normal S1 and S2, no murmur  ABDOMEN: Nontender to palpation, distended normoactive bowel sounds, no rebound/guarding;  MUSCULOSKELETAL:   4+ pitting edema up to the knees   NEURO: Alert and following commands appropriately   PSYCH: Calm   SKIN: No rashes    LABS:                        8.1    6.15  )-----------( 98       ( 21 May 2020 06:45 )             26.4     05-21    143  |  110<H>  |  9   ----------------------------<  91  3.6   |  26  |  0.68    Ca    7.3<L>      21 May 2020 06:45  Phos  2.7     05-21  Mg     1.9     05-21    TPro  4.5<L>  /  Alb  2.0<L>  /  TBili  1.8<H>  /  DBili  x   /  AST  40<H>  /  ALT  40<H>  /  AlkPhos  93  05-21              Culture - Acid Fast - Body Fluid w/Smear (collected 20 May 2020 22:53)  Source: .Body Fluid

## 2020-05-22 NOTE — PROGRESS NOTE ADULT - ASSESSMENT
Impression:     1. Retroperitoneal mass: ? lymphoma vs. TB vs. mass forming pancreatitis/mesenteritis.    2. Volume overload/ascites: Possible protein losing enteropathy in the setting of retroperitoneal process. SAAG is elevated but overall picture seems more consistent with low oncotic state.    3. Abnormal liver tests/possible portal hypertension     4. Iron deficiency anemia, thrombocytopenia. Suggestive of malabsorbtion.    5. Bowel thickening on imaging: bowel congestion or enteritis?    Recommendation:  -would consider EGD with instestinal biopsy and EUS however must consider appropriate timing given patient is still grossly very volume overloaded and receiving IV diuretics. -follow up quant gold, can add on ADA to ascites studies  -agree it would be helpful to obtain collateral information from the surgeon.  -check TTG/Total IgA, vitamin D level  -check stool antitrypsin  -check ESR/CRP

## 2020-05-22 NOTE — CHART NOTE - NSCHARTNOTEFT_GEN_A_CORE
Please consult anestheiology for anticipated procedure early next week.  Also please swab for COVID on Monday for preop. Please consult anestheiology for anticipated procedure early next week.  Also please swab for COVID on Monday for preop. Patient would likely benefit from further paracentesis to optimize respiratory status preop.

## 2020-05-22 NOTE — PROGRESS NOTE ADULT - PROBLEM SELECTOR PLAN 6
DVT: SCDs  Diet: Regular   COVID: neg x1  Call for PT re-eval DVT: Lovenox 40 SQ daily   Diet: Regular   COVID: neg x1  Call for PT re-eval

## 2020-05-22 NOTE — PROGRESS NOTE ADULT - PROBLEM SELECTOR PLAN 2
Anasarca (significant LLE and abdominal swelling) in the setting of decreased oncotic pressure/ hypoalbuminemia and mild hypoproteinemia  - MR Liver show hepatic lesions c/w hemangioma and Perivascular soft tissue encasing celiac axis/SMA  - GI recs appreciated, possibility for EGD with intestinal biopsy and EUS but will require optimization  - f/u TTG/Total IgA, vitamin D level, stool fat and elastase, quant TB (indeterminate) and acid fast culture from ascitic fluid (NGTD)  - cytopath shows no malignant cells  - f/u medical records from [place], #in provider handoff, forms in chart  - Duplex w/ no DVT Anasarca (significant LLE and abdominal swelling) in the setting of decreased oncotic pressure/ hypoalbuminemia and mild hypoproteinemia  - MR Liver show hepatic lesions c/w hemangioma and Perivascular soft tissue encasing celiac axis/SMA  - Lasix increased to 40mg BID IV   - GI recs appreciated, possibility for EGD with intestinal biopsy and EUS but will require optimization  - f/u TTG/Total IgA, vitamin D level, stool fat and elastase, quant TB (indeterminate) and acid fast culture from ascitic fluid (NGTD)  - cytopath shows no malignant cells  - f/u medical records from [place], #in provider handoff, forms in chart  - Duplex w/ no DVT

## 2020-05-22 NOTE — PROGRESS NOTE ADULT - SUBJECTIVE AND OBJECTIVE BOX
Patient is a 49y old  Female who presents with a chief complaint of Anemia (22 May 2020 07:04)      SUBJECTIVE / OVERNIGHT EVENTS:    MEDICATIONS  (STANDING):  ferrous    sulfate 325 milliGRAM(s) Oral daily  furosemide   Injectable 40 milliGRAM(s) IV Push daily  lidocaine 1% Injectable 20 milliLiter(s) Local Injection once    MEDICATIONS  (PRN):  ALBUTerol    90 MICROgram(s) HFA Inhaler 2 Puff(s) Inhalation every 6 hours PRN Shortness of Breath and/or Wheezing  benzocaine 15 mG/menthol 3.6 mG (Sugar-Free) Lozenge 1 Lozenge Oral every 6 hours PRN Sore Throat          ROS: Denies fever, shortness of breath, chest pain, dysuria, or diarrhea    PHYSICAL EXAM:  GENERAL: NAD, well-developed  HEAD:  Atraumatic, Normocephalic  EYES: EOMI, PERRLA, conjunctiva and sclera anicteric  NECK: Supple, No JVD  CHEST/LUNG: Clear to auscultation bilaterally; No wheeze  HEART: Regular rate and rhythm; No murmurs, rubs, or gallops  ABDOMEN: Soft, Nontender, Nondistended; Bowel sounds present, no hepatosplenomegaly, no rebound or guarding  EXTREMITIES:  2+ Peripheral Pulses, No clubbing, cyanosis, or edema  PSYCH: AAOx3  NEUROLOGY: non-focal, no asterixis  SKIN: No rashes or lesion    LABS:                        8.7    8.77  )-----------( 123      ( 22 May 2020 06:45 )             29.2     05-22    141  |  103  |  7   ----------------------------<  89  4.1   |  30  |  0.74    Ca    7.5<L>      22 May 2020 06:45  Phos  3.0     05-22  Mg     1.8     05-22    TPro  5.4<L>  /  Alb  2.3<L>  /  TBili  2.2<H>  /  DBili  x   /  AST  52<H>  /  ALT  50<H>  /  AlkPhos  125<H>  05-22    LIVER FUNCTIONS - ( 22 May 2020 06:45 )  Alb: 2.3 g/dL / Pro: 5.4 g/dL / ALK PHOS: 125 u/L / ALT: 50 u/L / AST: 52 u/L / GGT: x                     RADIOLOGY & ADDITIONAL TESTS: Patient is a 49y old  Female who presents with a chief complaint of Anemia (22 May 2020 07:04)      SUBJECTIVE / OVERNIGHT EVENTS:  no acute events  MEDICATIONS  (STANDING):  ferrous    sulfate 325 milliGRAM(s) Oral daily  furosemide   Injectable 40 milliGRAM(s) IV Push daily  lidocaine 1% Injectable 20 milliLiter(s) Local Injection once    MEDICATIONS  (PRN):  ALBUTerol    90 MICROgram(s) HFA Inhaler 2 Puff(s) Inhalation every 6 hours PRN Shortness of Breath and/or Wheezing  benzocaine 15 mG/menthol 3.6 mG (Sugar-Free) Lozenge 1 Lozenge Oral every 6 hours PRN Sore Throat          ROS: Denies fever, shortness of breath, chest pain, dysuria, or diarrhea    PHYSICAL EXAM:  GENERAL: NAD, well-developed  HEAD:  Atraumatic, Normocephalic  EYES: EOMI, PERRLA, conjunctiva and sclera anicteric  NECK: Supple, No JVD  CHEST/LUNG: Clear to auscultation bilaterally; No wheeze  HEART: Regular rate and rhythm; No murmurs, rubs, or gallops  ABDOMEN: distended; Bowel sounds present, no hepatosplenomegaly, no rebound or guarding  EXTREMITIES:  massive edema  PSYCH: AAOx3  NEUROLOGY: non-focal, no asterixis  SKIN: No rashes or lesion    LABS:                        8.7    8.77  )-----------( 123      ( 22 May 2020 06:45 )             29.2     05-22    141  |  103  |  7   ----------------------------<  89  4.1   |  30  |  0.74    Ca    7.5<L>      22 May 2020 06:45  Phos  3.0     05-22  Mg     1.8     05-22    TPro  5.4<L>  /  Alb  2.3<L>  /  TBili  2.2<H>  /  DBili  x   /  AST  52<H>  /  ALT  50<H>  /  AlkPhos  125<H>  05-22    LIVER FUNCTIONS - ( 22 May 2020 06:45 )  Alb: 2.3 g/dL / Pro: 5.4 g/dL / ALK PHOS: 125 u/L / ALT: 50 u/L / AST: 52 u/L / GGT: x                     RADIOLOGY & ADDITIONAL TESTS:

## 2020-05-22 NOTE — PROGRESS NOTE ADULT - PROBLEM SELECTOR PLAN 1
GI planning for possible EUS with biopsy next week  CA 19-9 unremarkable but CEA and  elevated CT A/P  Indeterminate hyperenhancing lesions in the right hepatic lobe measuring 1.2 cm and 0.5 cm. CA 19-9 unremarkable but CEA and  elevated  - GI planning for possible EUS with biopsy next week  - Trying to obtain records   - Anesthesia consulted

## 2020-05-23 LAB
ALBUMIN SERPL ELPH-MCNC: 2.3 G/DL — LOW (ref 3.3–5)
ALP SERPL-CCNC: 127 U/L — HIGH (ref 40–120)
ALT FLD-CCNC: 51 U/L — HIGH (ref 4–33)
ANION GAP SERPL CALC-SCNC: 12 MMO/L — SIGNIFICANT CHANGE UP (ref 7–14)
AST SERPL-CCNC: 51 U/L — HIGH (ref 4–32)
BASOPHILS # BLD AUTO: 0.04 K/UL — SIGNIFICANT CHANGE UP (ref 0–0.2)
BASOPHILS NFR BLD AUTO: 0.4 % — SIGNIFICANT CHANGE UP (ref 0–2)
BILIRUB SERPL-MCNC: 2.3 MG/DL — HIGH (ref 0.2–1.2)
BUN SERPL-MCNC: 9 MG/DL — SIGNIFICANT CHANGE UP (ref 7–23)
CALCIUM SERPL-MCNC: 7.8 MG/DL — LOW (ref 8.4–10.5)
CHLORIDE SERPL-SCNC: 101 MMOL/L — SIGNIFICANT CHANGE UP (ref 98–107)
CO2 SERPL-SCNC: 28 MMOL/L — SIGNIFICANT CHANGE UP (ref 22–31)
CREAT SERPL-MCNC: 0.78 MG/DL — SIGNIFICANT CHANGE UP (ref 0.5–1.3)
EOSINOPHIL # BLD AUTO: 0.35 K/UL — SIGNIFICANT CHANGE UP (ref 0–0.5)
EOSINOPHIL NFR BLD AUTO: 3.3 % — SIGNIFICANT CHANGE UP (ref 0–6)
GLUCOSE SERPL-MCNC: 118 MG/DL — HIGH (ref 70–99)
HCT VFR BLD CALC: 30.3 % — LOW (ref 34.5–45)
HGB BLD-MCNC: 8.7 G/DL — LOW (ref 11.5–15.5)
IMM GRANULOCYTES NFR BLD AUTO: 0.6 % — SIGNIFICANT CHANGE UP (ref 0–1.5)
LYMPHOCYTES # BLD AUTO: 0.64 K/UL — LOW (ref 1–3.3)
LYMPHOCYTES # BLD AUTO: 6 % — LOW (ref 13–44)
MAGNESIUM SERPL-MCNC: 1.8 MG/DL — SIGNIFICANT CHANGE UP (ref 1.6–2.6)
MCHC RBC-ENTMCNC: 24.6 PG — LOW (ref 27–34)
MCHC RBC-ENTMCNC: 28.7 % — LOW (ref 32–36)
MCV RBC AUTO: 85.8 FL — SIGNIFICANT CHANGE UP (ref 80–100)
MONOCYTES # BLD AUTO: 0.49 K/UL — SIGNIFICANT CHANGE UP (ref 0–0.9)
MONOCYTES NFR BLD AUTO: 4.6 % — SIGNIFICANT CHANGE UP (ref 2–14)
NEUTROPHILS # BLD AUTO: 9.12 K/UL — HIGH (ref 1.8–7.4)
NEUTROPHILS NFR BLD AUTO: 85.1 % — HIGH (ref 43–77)
NRBC # FLD: 0 K/UL — SIGNIFICANT CHANGE UP (ref 0–0)
PHOSPHATE SERPL-MCNC: 2.5 MG/DL — SIGNIFICANT CHANGE UP (ref 2.5–4.5)
PLATELET # BLD AUTO: 129 K/UL — LOW (ref 150–400)
PMV BLD: SIGNIFICANT CHANGE UP FL (ref 7–13)
POTASSIUM SERPL-MCNC: 4.1 MMOL/L — SIGNIFICANT CHANGE UP (ref 3.5–5.3)
POTASSIUM SERPL-SCNC: 4.1 MMOL/L — SIGNIFICANT CHANGE UP (ref 3.5–5.3)
PROT SERPL-MCNC: 5.8 G/DL — LOW (ref 6–8.3)
RBC # BLD: 3.53 M/UL — LOW (ref 3.8–5.2)
RBC # FLD: 28.2 % — HIGH (ref 10.3–14.5)
SODIUM SERPL-SCNC: 141 MMOL/L — SIGNIFICANT CHANGE UP (ref 135–145)
WBC # BLD: 10.7 K/UL — HIGH (ref 3.8–10.5)
WBC # FLD AUTO: 10.7 K/UL — HIGH (ref 3.8–10.5)

## 2020-05-23 PROCEDURE — 99233 SBSQ HOSP IP/OBS HIGH 50: CPT | Mod: GC

## 2020-05-23 RX ADMIN — Medication 325 MILLIGRAM(S): at 13:55

## 2020-05-23 RX ADMIN — Medication 40 MILLIGRAM(S): at 17:29

## 2020-05-23 RX ADMIN — ENOXAPARIN SODIUM 40 MILLIGRAM(S): 100 INJECTION SUBCUTANEOUS at 21:07

## 2020-05-23 RX ADMIN — Medication 40 MILLIGRAM(S): at 05:18

## 2020-05-23 NOTE — CHART NOTE - NSCHARTNOTEFT_GEN_A_CORE
Anesthesia consulted for optimization for surgery. 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 Pt came in with a hgb of 3.6, and required multiple transfusions. Pt hgb has stabilized to 8.7, and does not require any additional transfusions currently for anesthesia. Pt will need up to date 2x T&S and multiple units of blood available in the room just in case of massive bleeding.

## 2020-05-23 NOTE — PROGRESS NOTE ADULT - PROBLEM SELECTOR PLAN 2
Anasarca (significant LLE and abdominal swelling) in the setting of decreased oncotic pressure/ hypoalbuminemia and mild hypoproteinemia  - MR Liver show hepatic lesions c/w hemangioma and Perivascular soft tissue encasing celiac axis/SMA  - Lasix increased to 40mg BID IV   - GI recs appreciated, possibility for EGD with intestinal biopsy and EUS but will require optimization  - f/u TTG/Total IgA, vitamin D level, stool fat and elastase, quant TB (indeterminate) and acid fast culture from ascitic fluid (NGTD)  - cytopath shows no malignant cells  - f/u medical records from [place], #in provider handoff, forms in chart  - Duplex w/ no DVT Anasarca (significant LLE and abdominal swelling) in the setting of decreased oncotic pressure/ hypoalbuminemia and mild hypoproteinemia  - MR Liver show hepatic lesions c/w hemangioma and Perivascular soft tissue encasing celiac axis/SMA  - c/w Lasix 40mg BID IV   - GI recs appreciated, possibility for EGD with intestinal biopsy and EUS  - f/u TTG, vitamin D level, stool fat and elastase; quant TB (indeterminate), acid fast culture from ascitic fluid (NGTD), total IgA elevated  - cytopath shows no malignant cells  - f/u medical records from [place], #in provider handoff, forms in chart  - Duplex w/ no DVT

## 2020-05-23 NOTE — PROGRESS NOTE ADULT - PROBLEM SELECTOR PLAN 1
CT A/P  Indeterminate hyperenhancing lesions in the right hepatic lobe measuring 1.2 cm and 0.5 cm. CA 19-9 unremarkable but CEA and  elevated  - GI planning for possible EUS with biopsy next week  - Trying to obtain records   - Anesthesia consulted CT A/P Indeterminate hyperenhancing lesions in the right hepatic lobe measuring 1.2 cm and 0.5 cm. CA 19-9 unremarkable but CEA and  elevated  - GI planning for possible EUS with biopsy next week  - Patient optimized for EUS; anesthesia recs appreciated   - Trying to obtain records

## 2020-05-23 NOTE — PROGRESS NOTE ADULT - SUBJECTIVE AND OBJECTIVE BOX
PROGRESS NOTE:     Patient is a 49y old  Female who presents with a chief complaint of Anemia (22 May 2020 07:59)      SUBJECTIVE / OVERNIGHT EVENTS:        MEDICATIONS  (STANDING):  enoxaparin Injectable 40 milliGRAM(s) SubCutaneous at bedtime  ferrous    sulfate 325 milliGRAM(s) Oral daily  furosemide   Injectable 40 milliGRAM(s) IV Push every 12 hours  lidocaine 1% Injectable 20 milliLiter(s) Local Injection once    MEDICATIONS  (PRN):  ALBUTerol    90 MICROgram(s) HFA Inhaler 2 Puff(s) Inhalation every 6 hours PRN Shortness of Breath and/or Wheezing  benzocaine 15 mG/menthol 3.6 mG (Sugar-Free) Lozenge 1 Lozenge Oral every 6 hours PRN Sore Throat      CAPILLARY BLOOD GLUCOSE        I&O's Summary    22 May 2020 07:01  -  23 May 2020 07:00  --------------------------------------------------------  IN: 400 mL / OUT: 2550 mL / NET: -2150 mL        PHYSICAL EXAM:  Vital Signs Last 24 Hrs  T(C): 36.6 (23 May 2020 05:13), Max: 36.9 (22 May 2020 14:27)  T(F): 97.9 (23 May 2020 05:13), Max: 98.4 (22 May 2020 14:27)  HR: 65 (23 May 2020 05:13) (65 - 77)  BP: 120/67 (23 May 2020 05:13) (103/54 - 129/74)  BP(mean): --  RR: 16 (23 May 2020 05:13) (16 - 19)  SpO2: 100% (23 May 2020 05:13) (98% - 100%)    CONSTITUTIONAL: NAD, well-developed  RESPIRATORY: Normal respiratory effort; lungs are clear to auscultation bilaterally  CARDIOVASCULAR: Regular rate and rhythm, normal S1 and S2, no murmur/rub/gallop; No lower extremity edema; Peripheral pulses are 2+ bilaterally  ABDOMEN: Nontender to palpation, normoactive bowel sounds, no rebound/guarding; No hepatosplenomegaly  MUSCLOSKELETAL: no clubbing or cyanosis of digits; no joint swelling or tenderness to palpation  NEURO: CN 2-12 grossly intact, moves all limbs spontaneously  PSYCH: A+O to person, place, and time; affect appropriate    LABS:                        8.7    10.70 )-----------( 129      ( 23 May 2020 06:30 )             30.3     05-23    141  |  101  |  9   ----------------------------<  118<H>  4.1   |  28  |  0.78    Ca    7.8<L>      23 May 2020 06:30  Phos  2.5     05-23  Mg     1.8     05-23    TPro  5.8<L>  /  Alb  2.3<L>  /  TBili  2.3<H>  /  DBili  x   /  AST  51<H>  /  ALT  51<H>  /  AlkPhos  127<H>  05-23              Culture - Acid Fast - Body Fluid w/Smear (collected 20 May 2020 22:53)  Source: .Body Fluid        RADIOLOGY & ADDITIONAL TESTS:  Results Reviewed:   Imaging Personally Reviewed:  Electrocardiogram Personally Reviewed:    COORDINATION OF CARE:  Care Discussed with Consultants/Other Providers [Y/N]:  Prior or Outpatient Records Reviewed [Y/N]: PROGRESS NOTE:     Patient is a 49y old  Female who presents with a chief complaint of Anemia (22 May 2020 07:59)      SUBJECTIVE / OVERNIGHT EVENTS:  - naeo  - states she is urinating a lot w/ improvement in LE/abd edema  - walking around more and able to bed knees now  - denies cp, abd pain, n/v, sob     MEDICATIONS  (STANDING):  enoxaparin Injectable 40 milliGRAM(s) SubCutaneous at bedtime  ferrous    sulfate 325 milliGRAM(s) Oral daily  furosemide   Injectable 40 milliGRAM(s) IV Push every 12 hours  lidocaine 1% Injectable 20 milliLiter(s) Local Injection once    MEDICATIONS  (PRN):  ALBUTerol    90 MICROgram(s) HFA Inhaler 2 Puff(s) Inhalation every 6 hours PRN Shortness of Breath and/or Wheezing  benzocaine 15 mG/menthol 3.6 mG (Sugar-Free) Lozenge 1 Lozenge Oral every 6 hours PRN Sore Throat      CAPILLARY BLOOD GLUCOSE        I&O's Summary    22 May 2020 07:01  -  23 May 2020 07:00  --------------------------------------------------------  IN: 400 mL / OUT: 2550 mL / NET: -2150 mL        PHYSICAL EXAM:  Vital Signs Last 24 Hrs  T(C): 36.6 (23 May 2020 05:13), Max: 36.9 (22 May 2020 14:27)  T(F): 97.9 (23 May 2020 05:13), Max: 98.4 (22 May 2020 14:27)  HR: 65 (23 May 2020 05:13) (65 - 77)  BP: 120/67 (23 May 2020 05:13) (103/54 - 129/74)  BP(mean): --  RR: 16 (23 May 2020 05:13) (16 - 19)  SpO2: 100% (23 May 2020 05:13) (98% - 100%)    General: well appearing female, sitting up in bed  HEENT: neck supple  Cardiovascular: normal s1 s2 no grm  Respiratory: cta b/l   Abdominal: soft, distended non tense, no ttp  Extremities: 4+ pitting edema   Neurologic: non focal  Psych: awake alert answering questions     LABS:                        8.7    10.70 )-----------( 129      ( 23 May 2020 06:30 )             30.3     05-23    141  |  101  |  9   ----------------------------<  118<H>  4.1   |  28  |  0.78    Ca    7.8<L>      23 May 2020 06:30  Phos  2.5     05-23  Mg     1.8     05-23    TPro  5.8<L>  /  Alb  2.3<L>  /  TBili  2.3<H>  /  DBili  x   /  AST  51<H>  /  ALT  51<H>  /  AlkPhos  127<H>  05-23              Culture - Acid Fast - Body Fluid w/Smear (collected 20 May 2020 22:53)  Source: .Body Fluid

## 2020-05-23 NOTE — PROGRESS NOTE ADULT - PROBLEM SELECTOR PLAN 3
Pt with pulmonary nodules not on CT scan, could possibly be malignancy  - CT chest w/ pleural effusion b/l patchy opacities

## 2020-05-24 DIAGNOSIS — D72.829 ELEVATED WHITE BLOOD CELL COUNT, UNSPECIFIED: ICD-10-CM

## 2020-05-24 LAB
ALBUMIN SERPL ELPH-MCNC: 2.3 G/DL — LOW (ref 3.3–5)
ALP SERPL-CCNC: 136 U/L — HIGH (ref 40–120)
ALT FLD-CCNC: 48 U/L — HIGH (ref 4–33)
ANION GAP SERPL CALC-SCNC: 8 MMO/L — SIGNIFICANT CHANGE UP (ref 7–14)
AST SERPL-CCNC: 47 U/L — HIGH (ref 4–32)
BASOPHILS # BLD AUTO: 0.06 K/UL — SIGNIFICANT CHANGE UP (ref 0–0.2)
BASOPHILS NFR BLD AUTO: 0.5 % — SIGNIFICANT CHANGE UP (ref 0–2)
BILIRUB SERPL-MCNC: 2.1 MG/DL — HIGH (ref 0.2–1.2)
BUN SERPL-MCNC: 10 MG/DL — SIGNIFICANT CHANGE UP (ref 7–23)
CALCIUM SERPL-MCNC: 8 MG/DL — LOW (ref 8.4–10.5)
CHLORIDE SERPL-SCNC: 99 MMOL/L — SIGNIFICANT CHANGE UP (ref 98–107)
CO2 SERPL-SCNC: 31 MMOL/L — SIGNIFICANT CHANGE UP (ref 22–31)
CREAT SERPL-MCNC: 0.74 MG/DL — SIGNIFICANT CHANGE UP (ref 0.5–1.3)
CRP SERPL-MCNC: 12.1 MG/L — HIGH
EOSINOPHIL # BLD AUTO: 0.5 K/UL — SIGNIFICANT CHANGE UP (ref 0–0.5)
EOSINOPHIL NFR BLD AUTO: 4 % — SIGNIFICANT CHANGE UP (ref 0–6)
ERYTHROCYTE [SEDIMENTATION RATE] IN BLOOD: 10 MM/HR — SIGNIFICANT CHANGE UP (ref 4–25)
GLUCOSE SERPL-MCNC: 82 MG/DL — SIGNIFICANT CHANGE UP (ref 70–99)
HCT VFR BLD CALC: 31 % — LOW (ref 34.5–45)
HGB BLD-MCNC: 9.3 G/DL — LOW (ref 11.5–15.5)
IMM GRANULOCYTES NFR BLD AUTO: 0.6 % — SIGNIFICANT CHANGE UP (ref 0–1.5)
LYMPHOCYTES # BLD AUTO: 1.39 K/UL — SIGNIFICANT CHANGE UP (ref 1–3.3)
LYMPHOCYTES # BLD AUTO: 11 % — LOW (ref 13–44)
MAGNESIUM SERPL-MCNC: 1.9 MG/DL — SIGNIFICANT CHANGE UP (ref 1.6–2.6)
MCHC RBC-ENTMCNC: 26 PG — LOW (ref 27–34)
MCHC RBC-ENTMCNC: 30 % — LOW (ref 32–36)
MCV RBC AUTO: 86.6 FL — SIGNIFICANT CHANGE UP (ref 80–100)
MONOCYTES # BLD AUTO: 0.75 K/UL — SIGNIFICANT CHANGE UP (ref 0–0.9)
MONOCYTES NFR BLD AUTO: 5.9 % — SIGNIFICANT CHANGE UP (ref 2–14)
NEUTROPHILS # BLD AUTO: 9.86 K/UL — HIGH (ref 1.8–7.4)
NEUTROPHILS NFR BLD AUTO: 78 % — HIGH (ref 43–77)
NRBC # FLD: 0 K/UL — SIGNIFICANT CHANGE UP (ref 0–0)
PHOSPHATE SERPL-MCNC: 2.8 MG/DL — SIGNIFICANT CHANGE UP (ref 2.5–4.5)
PLATELET # BLD AUTO: 172 K/UL — SIGNIFICANT CHANGE UP (ref 150–400)
PMV BLD: SIGNIFICANT CHANGE UP FL (ref 7–13)
POTASSIUM SERPL-MCNC: 4.2 MMOL/L — SIGNIFICANT CHANGE UP (ref 3.5–5.3)
POTASSIUM SERPL-SCNC: 4.2 MMOL/L — SIGNIFICANT CHANGE UP (ref 3.5–5.3)
PROT SERPL-MCNC: 5.9 G/DL — LOW (ref 6–8.3)
RBC # BLD: 3.58 M/UL — LOW (ref 3.8–5.2)
RBC # FLD: 29.3 % — HIGH (ref 10.3–14.5)
SODIUM SERPL-SCNC: 138 MMOL/L — SIGNIFICANT CHANGE UP (ref 135–145)
WBC # BLD: 12.63 K/UL — HIGH (ref 3.8–10.5)
WBC # FLD AUTO: 12.63 K/UL — HIGH (ref 3.8–10.5)

## 2020-05-24 PROCEDURE — 99233 SBSQ HOSP IP/OBS HIGH 50: CPT | Mod: GC

## 2020-05-24 RX ORDER — SODIUM CHLORIDE 0.65 %
1 AEROSOL, SPRAY (ML) NASAL DAILY
Refills: 0 | Status: DISCONTINUED | OUTPATIENT
Start: 2020-05-24 | End: 2020-05-28

## 2020-05-24 RX ADMIN — Medication 40 MILLIGRAM(S): at 17:37

## 2020-05-24 RX ADMIN — Medication 325 MILLIGRAM(S): at 12:44

## 2020-05-24 RX ADMIN — Medication 40 MILLIGRAM(S): at 05:27

## 2020-05-24 RX ADMIN — ENOXAPARIN SODIUM 40 MILLIGRAM(S): 100 INJECTION SUBCUTANEOUS at 21:37

## 2020-05-24 NOTE — PROGRESS NOTE ADULT - PROBLEM SELECTOR PLAN 6
DVT: Lovenox 40 SQ daily   Diet: Regular   COVID: neg x1  Call for PT re-eval Likely in setting of hepatic congestion, ascites demonstrated on CT but no evidence of cirrhosis. Improving. Elevated indirect bili.  - Hep panel neg  - monitor

## 2020-05-24 NOTE — PROGRESS NOTE ADULT - PROBLEM SELECTOR PLAN 5
Likely in setting of hepatic congestion, ascites demonstrated on CT but no evidence of cirrhosis. Improving. Elevated indirect bili.  - Hep panel neg  - monitor Pt presented w/ anemia to 3.8 and chronic fatigue. Microcytic. Now improving s/p 4u PRBCs. Suspect iron deficiency vs anemia of chronic illness. Low suspicion for GI Bleed. Concerning for malignancy given she had pancreatic mass (unclear hx). Low suspicion for hemolysis.  - iron lvl decreased; supplemented  - Hgb stable   - monitor HgB and transfuse if <7

## 2020-05-24 NOTE — PROGRESS NOTE ADULT - PROBLEM SELECTOR PLAN 4
Pt presented w/ anemia to 3.8 and chronic fatigue. Microcytic. Now improving s/p 4u PRBCs. Suspect iron deficiency vs anemia of chronic illness. Low suspicion for GI Bleed. Concerning for malignancy given she had pancreatic mass (unclear hx). Low suspicion for hemolysis.  - iron lvl decreased; supplemented  - Hgb stable   - monitor HgB and transfuse if <7 Pt with pulmonary nodules not on CT scan, could possibly be malignancy  - CT chest w/ pleural effusion b/l patchy opacities

## 2020-05-24 NOTE — CHART NOTE - NSCHARTNOTEFT_GEN_A_CORE
Source: [facility] reviewed  Unable to conduct in-person interview or nutrition-focused physical exam due to COVID19 contact precautions to mitigate spread of virus.   Attempted to contact pt on room phone and personal cell phone but calls were unavailing.     Current Diet : Diet, Regular:   Supplement Feeding Modality:  Oral  Ensure Enlive Cans or Servings Per Day:  1       Frequency:  Daily (05-20-20 @ 18:04)    Current Weight: Weight 5/22 97.1 kg   Adm wt 97.1 kg    Pt continues with work up for retroperitoneal mass. Unable to ascertain oral intake at this time. No GI issues noted; last BM 5/23. Edema notably improving as pt is on diuresis regimen. Pt remains at severe risk for malnutrition based on noted extremity edema and predicted suboptimal oral intake. Please record meal percentages consumed on nursing flow sheet to better assess pt's nutrition status. RDN services to remain available as needed.     __________________ Pertinent Medications__________________   MEDICATIONS  (STANDING):  enoxaparin Injectable 40 milliGRAM(s) SubCutaneous at bedtime  ferrous    sulfate 325 milliGRAM(s) Oral daily  furosemide   Injectable 40 milliGRAM(s) IV Push every 12 hours  lidocaine 1% Injectable 20 milliLiter(s) Local Injection once    MEDICATIONS  (PRN):  ALBUTerol    90 MICROgram(s) HFA Inhaler 2 Puff(s) Inhalation every 6 hours PRN Shortness of Breath and/or Wheezing  benzocaine 15 mG/menthol 3.6 mG (Sugar-Free) Lozenge 1 Lozenge Oral every 6 hours PRN Sore Throat  sodium chloride 0.65% Nasal 1 Spray(s) Both Nostrils daily PRN dry nose      __________________ Pertinent Labs__________________   05-24 Na138 mmol/L Glu 82 mg/dL K+ 4.2 mmol/L Cr  0.74 mg/dL BUN 10 mg/dL 05-24 Phos 2.8 mg/dL 05-24 Alb 2.3 g/dL<L>      Edema: 3+ L/R feet  Skin: Intact    Estimated Needs:   [x ] no change since previous assessment      Previous Nutrition Diagnosis:     Severe Malnutrition     Nutrition Diagnosis is [ x] ongoing       Recommend:    1). Continue with nutrition care plan as ordered.    2). Please document percentage of meals consumed in nursing flow sheet.    3). Monitor weights, labs, BM's, skin integrity, p.o. intake and edema.    4). Follow pt as per protocol.

## 2020-05-24 NOTE — PROGRESS NOTE ADULT - PROBLEM SELECTOR PLAN 3
Pt with pulmonary nodules not on CT scan, could possibly be malignancy  - CT chest w/ pleural effusion b/l patchy opacities afebrile, no signs of infection; prior UA/Blood cx neg, CT showing b/l patchy airspace opacities w/ no respiratory sx. Likely 2/2 to hemoconcentration 2/2 to diuretics  -cont to monitor off abx  -if spikes fever, reculture, start empiric abx

## 2020-05-24 NOTE — PROGRESS NOTE ADULT - PROBLEM SELECTOR PLAN 2
Anasarca (significant LLE and abdominal swelling) in the setting of decreased oncotic pressure/ hypoalbuminemia and mild hypoproteinemia  - MR Liver show hepatic lesions c/w hemangioma and Perivascular soft tissue encasing celiac axis/SMA  - c/w Lasix 40mg BID IV   - GI recs appreciated, possibility for EGD with intestinal biopsy and EUS  - f/u TTG, vitamin D level, stool fat and elastase; quant TB (indeterminate), acid fast culture from ascitic fluid (NGTD), total IgA elevated  - cytopath shows no malignant cells  - f/u medical records from [place], #in provider handoff, forms in chart  - Duplex w/ no DVT

## 2020-05-24 NOTE — PROGRESS NOTE ADULT - SUBJECTIVE AND OBJECTIVE BOX
PROGRESS NOTE:     Patient is a 49y old  Female who presents with a chief complaint of Anemia (23 May 2020 08:06)      SUBJECTIVE / OVERNIGHT EVENTS:        MEDICATIONS  (STANDING):  enoxaparin Injectable 40 milliGRAM(s) SubCutaneous at bedtime  ferrous    sulfate 325 milliGRAM(s) Oral daily  furosemide   Injectable 40 milliGRAM(s) IV Push every 12 hours  lidocaine 1% Injectable 20 milliLiter(s) Local Injection once    MEDICATIONS  (PRN):  ALBUTerol    90 MICROgram(s) HFA Inhaler 2 Puff(s) Inhalation every 6 hours PRN Shortness of Breath and/or Wheezing  benzocaine 15 mG/menthol 3.6 mG (Sugar-Free) Lozenge 1 Lozenge Oral every 6 hours PRN Sore Throat      CAPILLARY BLOOD GLUCOSE        I&O's Summary    22 May 2020 07:01  -  23 May 2020 07:00  --------------------------------------------------------  IN: 400 mL / OUT: 2550 mL / NET: -2150 mL    23 May 2020 07:01  -  24 May 2020 06:34  --------------------------------------------------------  IN: 1020 mL / OUT: 1700 mL / NET: -680 mL        PHYSICAL EXAM:  Vital Signs Last 24 Hrs  T(C): 36.8 (24 May 2020 05:20), Max: 36.8 (23 May 2020 17:30)  T(F): 98.2 (24 May 2020 05:20), Max: 98.3 (23 May 2020 17:30)  HR: 65 (24 May 2020 05:36) (65 - 75)  BP: 126/70 (24 May 2020 05:36) (117/55 - 129/70)  BP(mean): --  RR: 17 (24 May 2020 05:36) (17 - 18)  SpO2: 95% (24 May 2020 05:36) (95% - 99%)    CONSTITUTIONAL: NAD, well-developed  RESPIRATORY: Normal respiratory effort; lungs are clear to auscultation bilaterally  CARDIOVASCULAR: Regular rate and rhythm, normal S1 and S2, no murmur/rub/gallop; No lower extremity edema; Peripheral pulses are 2+ bilaterally  ABDOMEN: Nontender to palpation, normoactive bowel sounds, no rebound/guarding; No hepatosplenomegaly  MUSCLOSKELETAL: no clubbing or cyanosis of digits; no joint swelling or tenderness to palpation  NEURO: CN 2-12 grossly intact, moves all limbs spontaneously  PSYCH: A+O to person, place, and time; affect appropriate    LABS:                        8.7    10.70 )-----------( 129      ( 23 May 2020 06:30 )             30.3     05-23    141  |  101  |  9   ----------------------------<  118<H>  4.1   |  28  |  0.78    Ca    7.8<L>      23 May 2020 06:30  Phos  2.5     05-23  Mg     1.8     05-23    TPro  5.8<L>  /  Alb  2.3<L>  /  TBili  2.3<H>  /  DBili  x   /  AST  51<H>  /  ALT  51<H>  /  AlkPhos  127<H>  05-23 PROGRESS NOTE:     Patient is a 49y old  Female who presents with a chief complaint of Anemia (23 May 2020 08:06)      SUBJECTIVE / OVERNIGHT EVENTS:  - c/o 1 episode of self resolving wheezing  - states she is urinating a lot w/ improvement of leg/abd swelling  - denies pain/discomfort; no abd pain, sob, cp, n/v       MEDICATIONS  (STANDING):  enoxaparin Injectable 40 milliGRAM(s) SubCutaneous at bedtime  ferrous    sulfate 325 milliGRAM(s) Oral daily  furosemide   Injectable 40 milliGRAM(s) IV Push every 12 hours  lidocaine 1% Injectable 20 milliLiter(s) Local Injection once    MEDICATIONS  (PRN):  ALBUTerol    90 MICROgram(s) HFA Inhaler 2 Puff(s) Inhalation every 6 hours PRN Shortness of Breath and/or Wheezing  benzocaine 15 mG/menthol 3.6 mG (Sugar-Free) Lozenge 1 Lozenge Oral every 6 hours PRN Sore Throat      CAPILLARY BLOOD GLUCOSE        I&O's Summary    22 May 2020 07:01  -  23 May 2020 07:00  --------------------------------------------------------  IN: 400 mL / OUT: 2550 mL / NET: -2150 mL    23 May 2020 07:01  -  24 May 2020 06:34  --------------------------------------------------------  IN: 1020 mL / OUT: 1700 mL / NET: -680 mL        PHYSICAL EXAM:  Vital Signs Last 24 Hrs  T(C): 36.8 (24 May 2020 05:20), Max: 36.8 (23 May 2020 17:30)  T(F): 98.2 (24 May 2020 05:20), Max: 98.3 (23 May 2020 17:30)  HR: 65 (24 May 2020 05:36) (65 - 75)  BP: 126/70 (24 May 2020 05:36) (117/55 - 129/70)  BP(mean): --  RR: 17 (24 May 2020 05:36) (17 - 18)  SpO2: 95% (24 May 2020 05:36) (95% - 99%)    CONSTITUTIONAL: NAD, well-developed  RESPIRATORY: Normal respiratory effort; lungs are clear to auscultation bilaterally  CARDIOVASCULAR: Regular rate and rhythm, normal S1 and S2, no murmur/rub/gallop; No lower extremity edema; Peripheral pulses are 2+ bilaterally  ABDOMEN: Nontender to palpation, normoactive bowel sounds, no rebound/guarding; No hepatosplenomegaly  MUSCLOSKELETAL: no clubbing or cyanosis of digits; no joint swelling or tenderness to palpation  NEURO: CN 2-12 grossly intact, moves all limbs spontaneously  PSYCH: A+O to person, place, and time; affect appropriate    LABS:                        8.7    10.70 )-----------( 129      ( 23 May 2020 06:30 )             30.3     05-23    141  |  101  |  9   ----------------------------<  118<H>  4.1   |  28  |  0.78    Ca    7.8<L>      23 May 2020 06:30  Phos  2.5     05-23  Mg     1.8     05-23    TPro  5.8<L>  /  Alb  2.3<L>  /  TBili  2.3<H>  /  DBili  x   /  AST  51<H>  /  ALT  51<H>  /  AlkPhos  127<H>  05-23 PROGRESS NOTE:     Patient is a 49y old  Female who presents with a chief complaint of Anemia (23 May 2020 08:06)      SUBJECTIVE / OVERNIGHT EVENTS:  - c/o 1 episode of self resolving wheezing  - states she is urinating a lot w/ improvement of leg/abd swelling  - denies pain/discomfort; no abd pain, sob, cp, n/v       MEDICATIONS  (STANDING):  enoxaparin Injectable 40 milliGRAM(s) SubCutaneous at bedtime  ferrous    sulfate 325 milliGRAM(s) Oral daily  furosemide   Injectable 40 milliGRAM(s) IV Push every 12 hours  lidocaine 1% Injectable 20 milliLiter(s) Local Injection once    MEDICATIONS  (PRN):  ALBUTerol    90 MICROgram(s) HFA Inhaler 2 Puff(s) Inhalation every 6 hours PRN Shortness of Breath and/or Wheezing  benzocaine 15 mG/menthol 3.6 mG (Sugar-Free) Lozenge 1 Lozenge Oral every 6 hours PRN Sore Throat      CAPILLARY BLOOD GLUCOSE        I&O's Summary    22 May 2020 07:01  -  23 May 2020 07:00  --------------------------------------------------------  IN: 400 mL / OUT: 2550 mL / NET: -2150 mL    23 May 2020 07:01  -  24 May 2020 06:34  --------------------------------------------------------  IN: 1020 mL / OUT: 1700 mL / NET: -680 mL        PHYSICAL EXAM:  Vital Signs Last 24 Hrs  T(C): 36.8 (24 May 2020 05:20), Max: 36.8 (23 May 2020 17:30)  T(F): 98.2 (24 May 2020 05:20), Max: 98.3 (23 May 2020 17:30)  HR: 65 (24 May 2020 05:36) (65 - 75)  BP: 126/70 (24 May 2020 05:36) (117/55 - 129/70)  BP(mean): --  RR: 17 (24 May 2020 05:36) (17 - 18)  SpO2: 95% (24 May 2020 05:36) (95% - 99%)    General: well appearing female, sitting up in bed  HEENT: neck supple  Cardiovascular: normal s1 s2 no grm  Respiratory: cta b/l   Abdominal: soft, distended non tense, no ttp  Extremities: 4+ pitting edema   Neurologic: non focal  Psych: awake alert answering questions     LABS:                        8.7    10.70 )-----------( 129      ( 23 May 2020 06:30 )             30.3     05-23    141  |  101  |  9   ----------------------------<  118<H>  4.1   |  28  |  0.78    Ca    7.8<L>      23 May 2020 06:30  Phos  2.5     05-23  Mg     1.8     05-23    TPro  5.8<L>  /  Alb  2.3<L>  /  TBili  2.3<H>  /  DBili  x   /  AST  51<H>  /  ALT  51<H>  /  AlkPhos  127<H>  05-23

## 2020-05-24 NOTE — PROGRESS NOTE ADULT - PROBLEM SELECTOR PLAN 1
CT A/P Indeterminate hyperenhancing lesions in the right hepatic lobe measuring 1.2 cm and 0.5 cm. CA 19-9 unremarkable but CEA and  elevated  - GI planning for possible EUS with biopsy next week  - Patient optimized for EUS; anesthesia recs appreciated   - Trying to obtain records

## 2020-05-24 NOTE — PROGRESS NOTE ADULT - PROBLEM SELECTOR PROBLEM 7
Normal vision: sees adequately in most situations; can see medication labels, newsprint Prophylactic measure

## 2020-05-24 NOTE — PROGRESS NOTE ADULT - ASSESSMENT
49 year-old female with history of pancreatic mass (benign as per patient) s/p Whipple in 2011 who presented to the ED sent in from her PCP office due to abnormal labs and weakness. Patient found with microcytic anemia to 3.6 s/p PRBC transfusion with now H/H stable. Found a RP mass on imaging pending work up. Complicated by ascites/anasarca s/p diagnostic (5/16) and therapeutic paracentesis (5/19) requiring diuresis. 49 year-old female with history of pancreatic mass (benign as per patient) s/p Whipple in 2011 who presented to the ED sent in from her PCP office due to abnormal labs and weakness. Patient found with microcytic anemia to 3.6 s/p PRBC transfusion with now H/H stable. Found a RP mass on imaging pending further work up. Complicated by ascites/anasarca s/p diagnostic (5/16) and therapeutic paracentesis (5/19) requiring diuresis. 49 year-old female with history of pancreatic mass (benign as per patient) s/p Whipple in 2011 who presented to the ED sent in from her PCP office due to abnormal labs and weakness. Patient found with microcytic anemia to 3.6 s/p PRBC transfusion with now H/H stable. Complicated by ascites/anasarca s/p diagnostic (5/16) and therapeutic paracentesis (5/19) requiring diuresis now pending biopsy via EUS for RP mass

## 2020-05-25 LAB
ALBUMIN SERPL ELPH-MCNC: 1.8 G/DL — LOW (ref 3.3–5)
ALP SERPL-CCNC: 111 U/L — SIGNIFICANT CHANGE UP (ref 40–120)
ALT FLD-CCNC: 39 U/L — HIGH (ref 4–33)
ANION GAP SERPL CALC-SCNC: 9 MMO/L — SIGNIFICANT CHANGE UP (ref 7–14)
AST SERPL-CCNC: 37 U/L — HIGH (ref 4–32)
BASOPHILS # BLD AUTO: 0.03 K/UL — SIGNIFICANT CHANGE UP (ref 0–0.2)
BASOPHILS NFR BLD AUTO: 0.4 % — SIGNIFICANT CHANGE UP (ref 0–2)
BILIRUB SERPL-MCNC: 1.7 MG/DL — HIGH (ref 0.2–1.2)
BUN SERPL-MCNC: 11 MG/DL — SIGNIFICANT CHANGE UP (ref 7–23)
CALCIUM SERPL-MCNC: 7.6 MG/DL — LOW (ref 8.4–10.5)
CHLORIDE SERPL-SCNC: 101 MMOL/L — SIGNIFICANT CHANGE UP (ref 98–107)
CO2 SERPL-SCNC: 30 MMOL/L — SIGNIFICANT CHANGE UP (ref 22–31)
CREAT SERPL-MCNC: 0.78 MG/DL — SIGNIFICANT CHANGE UP (ref 0.5–1.3)
EOSINOPHIL # BLD AUTO: 0.23 K/UL — SIGNIFICANT CHANGE UP (ref 0–0.5)
EOSINOPHIL NFR BLD AUTO: 2.9 % — SIGNIFICANT CHANGE UP (ref 0–6)
GLUCOSE SERPL-MCNC: 71 MG/DL — SIGNIFICANT CHANGE UP (ref 70–99)
HCT VFR BLD CALC: 25 % — LOW (ref 34.5–45)
HCT VFR BLD CALC: 26.3 % — LOW (ref 34.5–45)
HGB BLD-MCNC: 7.4 G/DL — LOW (ref 11.5–15.5)
HGB BLD-MCNC: 8 G/DL — LOW (ref 11.5–15.5)
IMM GRANULOCYTES NFR BLD AUTO: 0.4 % — SIGNIFICANT CHANGE UP (ref 0–1.5)
LYMPHOCYTES # BLD AUTO: 0.77 K/UL — LOW (ref 1–3.3)
LYMPHOCYTES # BLD AUTO: 9.8 % — LOW (ref 13–44)
MAGNESIUM SERPL-MCNC: 1.8 MG/DL — SIGNIFICANT CHANGE UP (ref 1.6–2.6)
MCHC RBC-ENTMCNC: 25.7 PG — LOW (ref 27–34)
MCHC RBC-ENTMCNC: 26.4 PG — LOW (ref 27–34)
MCHC RBC-ENTMCNC: 29.6 % — LOW (ref 32–36)
MCHC RBC-ENTMCNC: 30.4 % — LOW (ref 32–36)
MCV RBC AUTO: 86.8 FL — SIGNIFICANT CHANGE UP (ref 80–100)
MCV RBC AUTO: 86.8 FL — SIGNIFICANT CHANGE UP (ref 80–100)
MONOCYTES # BLD AUTO: 0.65 K/UL — SIGNIFICANT CHANGE UP (ref 0–0.9)
MONOCYTES NFR BLD AUTO: 8.3 % — SIGNIFICANT CHANGE UP (ref 2–14)
NEUTROPHILS # BLD AUTO: 6.15 K/UL — SIGNIFICANT CHANGE UP (ref 1.8–7.4)
NEUTROPHILS NFR BLD AUTO: 78.2 % — HIGH (ref 43–77)
NRBC # FLD: 0 K/UL — SIGNIFICANT CHANGE UP (ref 0–0)
NRBC # FLD: 0 K/UL — SIGNIFICANT CHANGE UP (ref 0–0)
PHOSPHATE SERPL-MCNC: 2.9 MG/DL — SIGNIFICANT CHANGE UP (ref 2.5–4.5)
PLATELET # BLD AUTO: 114 K/UL — LOW (ref 150–400)
PLATELET # BLD AUTO: 148 K/UL — LOW (ref 150–400)
PMV BLD: 11.5 FL — SIGNIFICANT CHANGE UP (ref 7–13)
PMV BLD: SIGNIFICANT CHANGE UP FL (ref 7–13)
POTASSIUM SERPL-MCNC: 3.7 MMOL/L — SIGNIFICANT CHANGE UP (ref 3.5–5.3)
POTASSIUM SERPL-SCNC: 3.7 MMOL/L — SIGNIFICANT CHANGE UP (ref 3.5–5.3)
PROT SERPL-MCNC: 5.2 G/DL — LOW (ref 6–8.3)
RBC # BLD: 2.88 M/UL — LOW (ref 3.8–5.2)
RBC # BLD: 3.03 M/UL — LOW (ref 3.8–5.2)
RBC # FLD: 30 % — HIGH (ref 10.3–14.5)
RBC # FLD: 30.5 % — HIGH (ref 10.3–14.5)
SODIUM SERPL-SCNC: 140 MMOL/L — SIGNIFICANT CHANGE UP (ref 135–145)
WBC # BLD: 7.86 K/UL — SIGNIFICANT CHANGE UP (ref 3.8–10.5)
WBC # BLD: 8.29 K/UL — SIGNIFICANT CHANGE UP (ref 3.8–10.5)
WBC # FLD AUTO: 7.86 K/UL — SIGNIFICANT CHANGE UP (ref 3.8–10.5)
WBC # FLD AUTO: 8.29 K/UL — SIGNIFICANT CHANGE UP (ref 3.8–10.5)

## 2020-05-25 PROCEDURE — 99233 SBSQ HOSP IP/OBS HIGH 50: CPT | Mod: GC

## 2020-05-25 RX ADMIN — Medication 40 MILLIGRAM(S): at 18:27

## 2020-05-25 RX ADMIN — Medication 40 MILLIGRAM(S): at 05:46

## 2020-05-25 RX ADMIN — Medication 325 MILLIGRAM(S): at 12:15

## 2020-05-25 RX ADMIN — ENOXAPARIN SODIUM 40 MILLIGRAM(S): 100 INJECTION SUBCUTANEOUS at 22:32

## 2020-05-25 NOTE — PROGRESS NOTE ADULT - PROBLEM SELECTOR PLAN 3
afebrile, no signs of infection; prior UA/Blood cx neg, CT showing b/l patchy airspace opacities w/ no respiratory sx. Likely 2/2 to hemoconcentration 2/2 to diuretics  -cont to monitor off abx  -if spikes fever, reculture, start empiric abx Resolved  afebrile, no signs of infection; prior UA/Blood cx neg, CT showing b/l patchy airspace opacities w/ no respiratory sx. Likely 2/2 to hemoconcentration 2/2 to diuretics  -cont to monitor off abx  -if spikes fever, reculture, start empiric abx

## 2020-05-25 NOTE — PROGRESS NOTE ADULT - PROBLEM SELECTOR PLAN 1
CT A/P Indeterminate hyperenhancing lesions in the right hepatic lobe measuring 1.2 cm and 0.5 cm. CA 19-9 unremarkable but CEA and  elevated  - GI planning for possible EUS with biopsy next week  - Patient optimized for EUS; anesthesia recs appreciated   - Trying to obtain records CT A/P Indeterminate hyperenhancing lesions in the right hepatic lobe measuring 1.2 cm and 0.5 cm. CA 19-9 unremarkable but CEA and  elevated  - GI planning for possible EUS with biopsy this week  - Patient optimized for EUS; anesthesia recs appreciated   - Trying to obtain records CT A/P Indeterminate hyper enhancing lesions in the right hepatic lobe measuring 1.2 cm and 0.5 cm. CA 19-9 unremarkable but CEA and  elevated  - GI planning for possible EUS with biopsy this week  c anesthesia recs appreciated   - Trying to obtain records

## 2020-05-25 NOTE — PROGRESS NOTE ADULT - ASSESSMENT
49 year-old female with history of pancreatic mass (benign as per patient) s/p Whipple in 2011 who presented to the ED sent in from her PCP office due to abnormal labs and weakness. Patient found with microcytic anemia to 3.6 s/p PRBC transfusion with now H/H stable. Complicated by ascites/anasarca s/p diagnostic (5/16) and therapeutic paracentesis (5/19) requiring diuresis now pending biopsy via EUS for RP mass

## 2020-05-25 NOTE — PROGRESS NOTE ADULT - SUBJECTIVE AND OBJECTIVE BOX
PROGRESS NOTE:     Patient is a 49y old  Female who presents with a chief complaint of Anemia (24 May 2020 06:34)      SUBJECTIVE / OVERNIGHT EVENTS:        MEDICATIONS  (STANDING):  enoxaparin Injectable 40 milliGRAM(s) SubCutaneous at bedtime  ferrous    sulfate 325 milliGRAM(s) Oral daily  furosemide   Injectable 40 milliGRAM(s) IV Push every 12 hours  lidocaine 1% Injectable 20 milliLiter(s) Local Injection once    MEDICATIONS  (PRN):  ALBUTerol    90 MICROgram(s) HFA Inhaler 2 Puff(s) Inhalation every 6 hours PRN Shortness of Breath and/or Wheezing  benzocaine 15 mG/menthol 3.6 mG (Sugar-Free) Lozenge 1 Lozenge Oral every 6 hours PRN Sore Throat  sodium chloride 0.65% Nasal 1 Spray(s) Both Nostrils daily PRN dry nose      CAPILLARY BLOOD GLUCOSE        I&O's Summary    24 May 2020 07:01  -  25 May 2020 07:00  --------------------------------------------------------  IN: 480 mL / OUT: 3050 mL / NET: -2570 mL        PHYSICAL EXAM:  Vital Signs Last 24 Hrs  T(C): 36.7 (25 May 2020 05:48), Max: 37 (24 May 2020 21:27)  T(F): 98.1 (25 May 2020 05:48), Max: 98.6 (24 May 2020 21:27)  HR: 76 (25 May 2020 05:57) (68 - 83)  BP: 107/61 (25 May 2020 05:57) (107/58 - 140/67)  BP(mean): --  RR: 18 (25 May 2020 05:57) (17 - 18)  SpO2: 96% (25 May 2020 05:57) (95% - 99%)    CONSTITUTIONAL: NAD, well-developed  RESPIRATORY: Normal respiratory effort; lungs are clear to auscultation bilaterally  CARDIOVASCULAR: Regular rate and rhythm, normal S1 and S2, no murmur/rub/gallop; No lower extremity edema; Peripheral pulses are 2+ bilaterally  ABDOMEN: Nontender to palpation, normoactive bowel sounds, no rebound/guarding; No hepatosplenomegaly  MUSCLOSKELETAL: no clubbing or cyanosis of digits; no joint swelling or tenderness to palpation  NEURO: CN 2-12 grossly intact, moves all limbs spontaneously  PSYCH: A+O to person, place, and time; affect appropriate    LABS:                        7.4    7.86  )-----------( 114      ( 25 May 2020 06:55 )             25.0     05-25    140  |  101  |  11  ----------------------------<  71  3.7   |  30  |  0.78    Ca    7.6<L>      25 May 2020 06:55  Phos  2.9     05-25  Mg     1.8     05-25    TPro  5.2<L>  /  Alb  1.8<L>  /  TBili  1.7<H>  /  DBili  x   /  AST  37<H>  /  ALT  39<H>  /  AlkPhos  111  05-25                RADIOLOGY & ADDITIONAL TESTS:  Results Reviewed:   Imaging Personally Reviewed:  Electrocardiogram Personally Reviewed:    COORDINATION OF CARE:  Care Discussed with Consultants/Other Providers [Y/N]:  Prior or Outpatient Records Reviewed [Y/N]: PROGRESS NOTE:     Patient is a 49y old  Female who presents with a chief complaint of Anemia (24 May 2020 06:34)      SUBJECTIVE / OVERNIGHT EVENTS:  - no subjective complaints  - states that she is urinating a lot w/ lasix  - states symptomatic improvement w/ lasix, able to range LEs more freely  - denies abd pain, n/v, sob   - NAEO      MEDICATIONS  (STANDING):  enoxaparin Injectable 40 milliGRAM(s) SubCutaneous at bedtime  ferrous    sulfate 325 milliGRAM(s) Oral daily  furosemide   Injectable 40 milliGRAM(s) IV Push every 12 hours  lidocaine 1% Injectable 20 milliLiter(s) Local Injection once    MEDICATIONS  (PRN):  ALBUTerol    90 MICROgram(s) HFA Inhaler 2 Puff(s) Inhalation every 6 hours PRN Shortness of Breath and/or Wheezing  benzocaine 15 mG/menthol 3.6 mG (Sugar-Free) Lozenge 1 Lozenge Oral every 6 hours PRN Sore Throat  sodium chloride 0.65% Nasal 1 Spray(s) Both Nostrils daily PRN dry nose      CAPILLARY BLOOD GLUCOSE        I&O's Summary    24 May 2020 07:01  -  25 May 2020 07:00  --------------------------------------------------------  IN: 480 mL / OUT: 3050 mL / NET: -2570 mL        PHYSICAL EXAM:  Vital Signs Last 24 Hrs  T(C): 36.7 (25 May 2020 05:48), Max: 37 (24 May 2020 21:27)  T(F): 98.1 (25 May 2020 05:48), Max: 98.6 (24 May 2020 21:27)  HR: 76 (25 May 2020 05:57) (68 - 83)  BP: 107/61 (25 May 2020 05:57) (107/58 - 140/67)  BP(mean): --  RR: 18 (25 May 2020 05:57) (17 - 18)  SpO2: 96% (25 May 2020 05:57) (95% - 99%)    General: well appearing female, sitting up in bed  HEENT: neck supple  Cardiovascular: normal s1 s2 no grm  Respiratory: cta b/l   Abdominal: soft, distended non tense, no ttp  Extremities: 4+ pitting edema   Neurologic: non focal  Psych: awake alert answering questions appropriate    LABS:                        7.4    7.86  )-----------( 114      ( 25 May 2020 06:55 )             25.0     05-25    140  |  101  |  11  ----------------------------<  71  3.7   |  30  |  0.78    Ca    7.6<L>      25 May 2020 06:55  Phos  2.9     05-25  Mg     1.8     05-25    TPro  5.2<L>  /  Alb  1.8<L>  /  TBili  1.7<H>  /  DBili  x   /  AST  37<H>  /  ALT  39<H>  /  AlkPhos  111  05-25

## 2020-05-25 NOTE — PROGRESS NOTE ADULT - PROBLEM SELECTOR PLAN 2
Anasarca (significant LLE and abdominal swelling) in the setting of decreased oncotic pressure/ hypoalbuminemia and mild hypoproteinemia  - MR Liver show hepatic lesions c/w hemangioma and Perivascular soft tissue encasing celiac axis/SMA  - c/w Lasix 40mg BID IV   - GI recs appreciated, possibility for EGD with intestinal biopsy and EUS  - f/u TTG, vitamin D level, stool fat and elastase; quant TB (indeterminate), acid fast culture from ascitic fluid (NGTD), total IgA elevated  - cytopath shows no malignant cells  - f/u medical records from [place], #in provider handoff, forms in chart  - Duplex w/ no DVT Anasarca (significant LLE and abdominal swelling) in the setting of decreased oncotic pressure/ hypoalbuminemia and mild hypoproteinemia  - MR Liver show hepatic lesions c/w hemangioma and Perivascular soft tissue encasing celiac axis/SMA  - c/w Lasix 40mg BID IV; will do once a day tomorrow  - GI recs appreciated, possibility for EGD with intestinal biopsy and EUS  - f/u TTG, vitamin D level, stool fat and elastase; quant TB (indeterminate), acid fast culture from ascitic fluid (NGTD), total IgA elevated  - cytopath shows no malignant cells  - f/u medical records from [place], #in provider handoff, forms in chart  - Duplex w/ no DVT

## 2020-05-26 ENCOUNTER — RESULT REVIEW (OUTPATIENT)
Age: 50
End: 2020-05-26

## 2020-05-26 LAB
ALBUMIN SERPL ELPH-MCNC: 2.2 G/DL — LOW (ref 3.3–5)
ALP SERPL-CCNC: 135 U/L — HIGH (ref 40–120)
ALT FLD-CCNC: 40 U/L — HIGH (ref 4–33)
ANION GAP SERPL CALC-SCNC: 10 MMO/L — SIGNIFICANT CHANGE UP (ref 7–14)
AST SERPL-CCNC: 42 U/L — HIGH (ref 4–32)
BASOPHILS # BLD AUTO: 0.05 K/UL — SIGNIFICANT CHANGE UP (ref 0–0.2)
BASOPHILS NFR BLD AUTO: 0.7 % — SIGNIFICANT CHANGE UP (ref 0–2)
BILIRUB SERPL-MCNC: 2.4 MG/DL — HIGH (ref 0.2–1.2)
BUN SERPL-MCNC: 12 MG/DL — SIGNIFICANT CHANGE UP (ref 7–23)
CALCIUM SERPL-MCNC: 7.7 MG/DL — LOW (ref 8.4–10.5)
CHLORIDE SERPL-SCNC: 98 MMOL/L — SIGNIFICANT CHANGE UP (ref 98–107)
CO2 SERPL-SCNC: 32 MMOL/L — HIGH (ref 22–31)
CREAT SERPL-MCNC: 0.79 MG/DL — SIGNIFICANT CHANGE UP (ref 0.5–1.3)
EOSINOPHIL # BLD AUTO: 0.26 K/UL — SIGNIFICANT CHANGE UP (ref 0–0.5)
EOSINOPHIL NFR BLD AUTO: 3.6 % — SIGNIFICANT CHANGE UP (ref 0–6)
GLUCOSE SERPL-MCNC: 80 MG/DL — SIGNIFICANT CHANGE UP (ref 70–99)
HCG SERPL-ACNC: < 5 MIU/ML — SIGNIFICANT CHANGE UP
HCT VFR BLD CALC: 27 % — LOW (ref 34.5–45)
HGB BLD-MCNC: 8.3 G/DL — LOW (ref 11.5–15.5)
IMM GRANULOCYTES NFR BLD AUTO: 0.4 % — SIGNIFICANT CHANGE UP (ref 0–1.5)
LYMPHOCYTES # BLD AUTO: 0.61 K/UL — LOW (ref 1–3.3)
LYMPHOCYTES # BLD AUTO: 8.4 % — LOW (ref 13–44)
MAGNESIUM SERPL-MCNC: 1.9 MG/DL — SIGNIFICANT CHANGE UP (ref 1.6–2.6)
MCHC RBC-ENTMCNC: 26.9 PG — LOW (ref 27–34)
MCHC RBC-ENTMCNC: 30.7 % — LOW (ref 32–36)
MCV RBC AUTO: 87.4 FL — SIGNIFICANT CHANGE UP (ref 80–100)
MONOCYTES # BLD AUTO: 0.78 K/UL — SIGNIFICANT CHANGE UP (ref 0–0.9)
MONOCYTES NFR BLD AUTO: 10.7 % — SIGNIFICANT CHANGE UP (ref 2–14)
NEUTROPHILS # BLD AUTO: 5.55 K/UL — SIGNIFICANT CHANGE UP (ref 1.8–7.4)
NEUTROPHILS NFR BLD AUTO: 76.2 % — SIGNIFICANT CHANGE UP (ref 43–77)
NRBC # FLD: 0 K/UL — SIGNIFICANT CHANGE UP (ref 0–0)
PHOSPHATE SERPL-MCNC: 3 MG/DL — SIGNIFICANT CHANGE UP (ref 2.5–4.5)
PLATELET # BLD AUTO: 168 K/UL — SIGNIFICANT CHANGE UP (ref 150–400)
PMV BLD: 12 FL — SIGNIFICANT CHANGE UP (ref 7–13)
POTASSIUM SERPL-MCNC: 3.4 MMOL/L — LOW (ref 3.5–5.3)
POTASSIUM SERPL-SCNC: 3.4 MMOL/L — LOW (ref 3.5–5.3)
PROT SERPL-MCNC: 6 G/DL — SIGNIFICANT CHANGE UP (ref 6–8.3)
RBC # BLD: 3.09 M/UL — LOW (ref 3.8–5.2)
RBC # FLD: 30.1 % — HIGH (ref 10.3–14.5)
SARS-COV-2 RNA SPEC QL NAA+PROBE: SIGNIFICANT CHANGE UP
SODIUM SERPL-SCNC: 140 MMOL/L — SIGNIFICANT CHANGE UP (ref 135–145)
TTG IGA SER-ACNC: 1.3 U/ML — SIGNIFICANT CHANGE UP
TTG IGG SER-ACNC: 6 U/ML — HIGH
WBC # BLD: 7.28 K/UL — SIGNIFICANT CHANGE UP (ref 3.8–10.5)
WBC # FLD AUTO: 7.28 K/UL — SIGNIFICANT CHANGE UP (ref 3.8–10.5)

## 2020-05-26 PROCEDURE — 88305 TISSUE EXAM BY PATHOLOGIST: CPT | Mod: 26

## 2020-05-26 PROCEDURE — 43259 EGD US EXAM DUODENUM/JEJUNUM: CPT | Mod: GC

## 2020-05-26 PROCEDURE — 99233 SBSQ HOSP IP/OBS HIGH 50: CPT | Mod: GC

## 2020-05-26 PROCEDURE — 43239 EGD BIOPSY SINGLE/MULTIPLE: CPT | Mod: GC,59

## 2020-05-26 RX ORDER — LIPASE/PROTEASE/AMYLASE 16-48-48K
1 CAPSULE,DELAYED RELEASE (ENTERIC COATED) ORAL
Refills: 0 | Status: DISCONTINUED | OUTPATIENT
Start: 2020-05-26 | End: 2020-05-28

## 2020-05-26 RX ORDER — SODIUM CHLORIDE 9 MG/ML
1000 INJECTION, SOLUTION INTRAVENOUS
Refills: 0 | Status: DISCONTINUED | OUTPATIENT
Start: 2020-05-26 | End: 2020-05-27

## 2020-05-26 RX ORDER — FUROSEMIDE 40 MG
40 TABLET ORAL DAILY
Refills: 0 | Status: DISCONTINUED | OUTPATIENT
Start: 2020-05-26 | End: 2020-05-26

## 2020-05-26 RX ORDER — POTASSIUM CHLORIDE 20 MEQ
40 PACKET (EA) ORAL EVERY 4 HOURS
Refills: 0 | Status: COMPLETED | OUTPATIENT
Start: 2020-05-26 | End: 2020-05-26

## 2020-05-26 RX ADMIN — Medication 40 MILLIEQUIVALENT(S): at 12:58

## 2020-05-26 RX ADMIN — Medication 325 MILLIGRAM(S): at 12:58

## 2020-05-26 RX ADMIN — Medication 1 CAPSULE(S): at 18:04

## 2020-05-26 RX ADMIN — Medication 40 MILLIEQUIVALENT(S): at 18:04

## 2020-05-26 NOTE — PROGRESS NOTE ADULT - PROBLEM SELECTOR PLAN 3
Resolved  afebrile, no signs of infection; prior UA/Blood cx neg, CT showing b/l patchy airspace opacities w/ no respiratory sx. Likely 2/2 to hemoconcentration 2/2 to diuretics  -cont to monitor off abx  -if spikes fever, reculture, start empiric abx

## 2020-05-26 NOTE — PROGRESS NOTE ADULT - PROBLEM SELECTOR PLAN 4
Pt with pulmonary nodules not on CT scan, could possibly be malignancy  - CT chest w/ pleural effusion b/l patchy opacities - Pt with pulmonary nodules not on CT scan, could possibly be malignancy  - CT chest w/ pleural effusion b/l patchy opacities

## 2020-05-26 NOTE — ADVANCED PRACTICE NURSE CONSULT - ASSESSMENT
General: A&O x 4, predominately bedbound independently able to turn and position from side to side, continent of urine and stool. (+) Anasarca, +4 pitting edema of bilateral lower extremities, abdomen with scattered areas of dry-patchy skin, RLQ and LUQ with dressings in place clean/dry and intact. Skin warm, dry with increased moisture in intertriginous folds, adequate skin turgor. Bilateral heels with blanchable erythema.    Risk for moisture associated dermatitis beneath abdominal pannus- skin intact.    Sacral fold extending to bilateral upper inner buttocks- mixed etiology stage 2 pressure injury complicated by moisture associated dermatitis- patient turned to left side during assessment- 4cmx3.5cmx0.2cm, irregular borders, 10% fibrin from 10-11 o'clock along wound edge extending to wound base, 90% pink-moist dermis. Scant serosanguinous drainage. Periwound skin intact with hyperpigmentation circumferentially, no increased warmth, no edema, no erythema, no induration noted. Goals of care: autolytic debridement of fibrin film, maintain moist environment to promote wound healing, protect periwound skin, protect from friction and sheer.

## 2020-05-26 NOTE — PROGRESS NOTE ADULT - SUBJECTIVE AND OBJECTIVE BOX
PROGRESS NOTE:     Patient is a 49y old  Female who presents with a chief complaint of Anemia (25 May 2020 08:12)      SUBJECTIVE / OVERNIGHT EVENTS:        MEDICATIONS  (STANDING):  enoxaparin Injectable 40 milliGRAM(s) SubCutaneous at bedtime  ferrous    sulfate 325 milliGRAM(s) Oral daily  furosemide   Injectable 40 milliGRAM(s) IV Push every 12 hours  lidocaine 1% Injectable 20 milliLiter(s) Local Injection once    MEDICATIONS  (PRN):  ALBUTerol    90 MICROgram(s) HFA Inhaler 2 Puff(s) Inhalation every 6 hours PRN Shortness of Breath and/or Wheezing  benzocaine 15 mG/menthol 3.6 mG (Sugar-Free) Lozenge 1 Lozenge Oral every 6 hours PRN Sore Throat  sodium chloride 0.65% Nasal 1 Spray(s) Both Nostrils daily PRN dry nose      CAPILLARY BLOOD GLUCOSE        I&O's Summary    24 May 2020 07:01  -  25 May 2020 07:00  --------------------------------------------------------  IN: 480 mL / OUT: 3050 mL / NET: -2570 mL    25 May 2020 07:01  -  26 May 2020 06:35  --------------------------------------------------------  IN: 240 mL / OUT: 1800 mL / NET: -1560 mL        PHYSICAL EXAM:  Vital Signs Last 24 Hrs  T(C): 36.7 (26 May 2020 05:03), Max: 37.3 (25 May 2020 17:51)  T(F): 98.1 (26 May 2020 05:03), Max: 99.2 (25 May 2020 17:51)  HR: 96 (26 May 2020 05:03) (72 - 96)  BP: 113/61 (26 May 2020 05:03) (113/61 - 126/66)  BP(mean): --  RR: 18 (26 May 2020 05:03) (18 - 20)  SpO2: 96% (26 May 2020 05:03) (96% - 100%)    CONSTITUTIONAL: NAD, well-developed  RESPIRATORY: Normal respiratory effort; lungs are clear to auscultation bilaterally  CARDIOVASCULAR: Regular rate and rhythm, normal S1 and S2, no murmur/rub/gallop; No lower extremity edema; Peripheral pulses are 2+ bilaterally  ABDOMEN: Nontender to palpation, normoactive bowel sounds, no rebound/guarding; No hepatosplenomegaly  MUSCLOSKELETAL: no clubbing or cyanosis of digits; no joint swelling or tenderness to palpation  NEURO: CN 2-12 grossly intact, moves all limbs spontaneously  PSYCH: A+O to person, place, and time; affect appropriate    LABS:                        8.0    8.29  )-----------( 148      ( 25 May 2020 18:35 )             26.3     05-25    140  |  101  |  11  ----------------------------<  71  3.7   |  30  |  0.78    Ca    7.6<L>      25 May 2020 06:55  Phos  2.9     05-25  Mg     1.8     05-25    TPro  5.2<L>  /  Alb  1.8<L>  /  TBili  1.7<H>  /  DBili  x   /  AST  37<H>  /  ALT  39<H>  /  AlkPhos  111  05-25 PROGRESS NOTE:     Patient is a 49y old  Female who presents with a chief complaint of Anemia (25 May 2020 08:12)      SUBJECTIVE / OVERNIGHT EVENTS:  - no subjective complaints, no discomfort/pain  - states she is urinating a lot, however states symptomatic improvement  - denies cp, abd pain, n/v, sob       MEDICATIONS  (STANDING):  enoxaparin Injectable 40 milliGRAM(s) SubCutaneous at bedtime  ferrous    sulfate 325 milliGRAM(s) Oral daily  furosemide   Injectable 40 milliGRAM(s) IV Push every 12 hours  lidocaine 1% Injectable 20 milliLiter(s) Local Injection once    MEDICATIONS  (PRN):  ALBUTerol    90 MICROgram(s) HFA Inhaler 2 Puff(s) Inhalation every 6 hours PRN Shortness of Breath and/or Wheezing  benzocaine 15 mG/menthol 3.6 mG (Sugar-Free) Lozenge 1 Lozenge Oral every 6 hours PRN Sore Throat  sodium chloride 0.65% Nasal 1 Spray(s) Both Nostrils daily PRN dry nose      CAPILLARY BLOOD GLUCOSE        I&O's Summary    24 May 2020 07:01  -  25 May 2020 07:00  --------------------------------------------------------  IN: 480 mL / OUT: 3050 mL / NET: -2570 mL    25 May 2020 07:01  -  26 May 2020 06:35  --------------------------------------------------------  IN: 240 mL / OUT: 1800 mL / NET: -1560 mL        PHYSICAL EXAM:  Vital Signs Last 24 Hrs  T(C): 36.7 (26 May 2020 05:03), Max: 37.3 (25 May 2020 17:51)  T(F): 98.1 (26 May 2020 05:03), Max: 99.2 (25 May 2020 17:51)  HR: 96 (26 May 2020 05:03) (72 - 96)  BP: 113/61 (26 May 2020 05:03) (113/61 - 126/66)  BP(mean): --  RR: 18 (26 May 2020 05:03) (18 - 20)  SpO2: 96% (26 May 2020 05:03) (96% - 100%)    CONSTITUTIONAL: NAD, well-developed  RESPIRATORY: Normal respiratory effort; lungs are clear to auscultation bilaterally  CARDIOVASCULAR: Regular rate and rhythm, normal S1 and S2, no murmur/rub/gallop; No lower extremity edema; Peripheral pulses are 2+ bilaterally  ABDOMEN: Nontender to palpation, normoactive bowel sounds, no rebound/guarding; No hepatosplenomegaly  MUSCLOSKELETAL: no clubbing or cyanosis of digits; no joint swelling or tenderness to palpation  NEURO: CN 2-12 grossly intact, moves all limbs spontaneously  PSYCH: A+O to person, place, and time; affect appropriate    LABS:                        8.0    8.29  )-----------( 148      ( 25 May 2020 18:35 )             26.3     05-25    140  |  101  |  11  ----------------------------<  71  3.7   |  30  |  0.78    Ca    7.6<L>      25 May 2020 06:55  Phos  2.9     05-25  Mg     1.8     05-25    TPro  5.2<L>  /  Alb  1.8<L>  /  TBili  1.7<H>  /  DBili  x   /  AST  37<H>  /  ALT  39<H>  /  AlkPhos  111  05-25 PROGRESS NOTE:     Patient is a 49y old  Female who presents with a chief complaint of Anemia (25 May 2020 08:12)      SUBJECTIVE / OVERNIGHT EVENTS:  - no subjective complaints, no discomfort/pain  - states she is urinating a lot, however states symptomatic improvement  - denies cp, abd pain, n/v, sob       MEDICATIONS  (STANDING):  enoxaparin Injectable 40 milliGRAM(s) SubCutaneous at bedtime  ferrous    sulfate 325 milliGRAM(s) Oral daily  furosemide   Injectable 40 milliGRAM(s) IV Push every 12 hours  lidocaine 1% Injectable 20 milliLiter(s) Local Injection once    MEDICATIONS  (PRN):  ALBUTerol    90 MICROgram(s) HFA Inhaler 2 Puff(s) Inhalation every 6 hours PRN Shortness of Breath and/or Wheezing  benzocaine 15 mG/menthol 3.6 mG (Sugar-Free) Lozenge 1 Lozenge Oral every 6 hours PRN Sore Throat  sodium chloride 0.65% Nasal 1 Spray(s) Both Nostrils daily PRN dry nose      CAPILLARY BLOOD GLUCOSE        I&O's Summary    24 May 2020 07:01  -  25 May 2020 07:00  --------------------------------------------------------  IN: 480 mL / OUT: 3050 mL / NET: -2570 mL    25 May 2020 07:01  -  26 May 2020 06:35  --------------------------------------------------------  IN: 240 mL / OUT: 1800 mL / NET: -1560 mL        PHYSICAL EXAM:  Vital Signs Last 24 Hrs  T(C): 36.7 (26 May 2020 05:03), Max: 37.3 (25 May 2020 17:51)  T(F): 98.1 (26 May 2020 05:03), Max: 99.2 (25 May 2020 17:51)  HR: 96 (26 May 2020 05:03) (72 - 96)  BP: 113/61 (26 May 2020 05:03) (113/61 - 126/66)  BP(mean): --  RR: 18 (26 May 2020 05:03) (18 - 20)  SpO2: 96% (26 May 2020 05:03) (96% - 100%)    General: well appearing female, sitting up in bed  HEENT: neck supple  Cardiovascular: normal s1 s2 no grm  Respiratory: cta b/l   Abdominal: soft, distended non tense, no ttp  Extremities: 4+ pitting edema   Neurologic: non focal  Psych: awake alert answering questions appropriate  LABS:                        8.0    8.29  )-----------( 148      ( 25 May 2020 18:35 )             26.3     05-25    140  |  101  |  11  ----------------------------<  71  3.7   |  30  |  0.78    Ca    7.6<L>      25 May 2020 06:55  Phos  2.9     05-25  Mg     1.8     05-25    TPro  5.2<L>  /  Alb  1.8<L>  /  TBili  1.7<H>  /  DBili  x   /  AST  37<H>  /  ALT  39<H>  /  AlkPhos  111  05-25

## 2020-05-26 NOTE — PROGRESS NOTE ADULT - PROBLEM SELECTOR PLAN 5
Pt presented w/ anemia to 3.8 and chronic fatigue. Microcytic. Now improving s/p 4u PRBCs. Suspect iron deficiency vs anemia of chronic illness. Low suspicion for GI Bleed. Concerning for malignancy given she had pancreatic mass (unclear hx). Low suspicion for hemolysis.  - iron lvl decreased; supplemented  - Hgb stable   - monitor HgB and transfuse if <7 Pt presented w/ anemia to 3.8 and chronic fatigue. Microcytic. Now improving s/p 4u PRBCs. Suspect iron deficiency vs anemia of chronic illness. Low suspicion for GI Bleed. Concerning for malignancy given she had pancreatic mass (unclear hx). Low suspicion for hemolysis.  - iron lvl decreased; supplemented  - Hgb stable   - monitor HgB and transfuse if <7  - Consider hematology evaluation for BM biopsy if nodule biopsy inconclusive

## 2020-05-26 NOTE — PROGRESS NOTE ADULT - PROBLEM SELECTOR PLAN 2
Anasarca (significant LLE and abdominal swelling) in the setting of decreased oncotic pressure/ hypoalbuminemia and mild hypoproteinemia  - MR Liver show hepatic lesions c/w hemangioma and Perivascular soft tissue encasing celiac axis/SMA  - c/w Lasix 40mg BID IV; will do once a day tomorrow  - GI recs appreciated, possibility for EGD with intestinal biopsy and EUS  - f/u TTG, vitamin D level, stool fat and elastase; quant TB (indeterminate), acid fast culture from ascitic fluid (NGTD), total IgA elevated  - cytopath shows no malignant cells  - f/u medical records from [place], #in provider handoff, forms in chart  - Duplex w/ no DVT Anasarca (significant LLE and abdominal swelling) in the setting of decreased oncotic pressure/ hypoalbuminemia and mild hypoproteinemia  - decrease lasix 40 qd 2/2 to contraction alkalosis  - s/p therapeutic para; cytopath shows no malignant cells  - f/u medical records from [place]  - Duplex w/ no DVT Anasarca (significant LLE and abdominal swelling) in the setting of decreased oncotic pressure/ hypoalbuminemia and mild hypoproteinemia  - dc Lasix 2/2 contraction alkalosis   - s/p therapeutic para; cytopath shows no malignant cells  - f/u medical records from [place]  - Duplex w/ no DVT

## 2020-05-26 NOTE — ADVANCED PRACTICE NURSE CONSULT - REASON FOR CONSULT
Patient seen on skin care rounds after wound care referral received for assessment of skin impairment and recommendations of topical management. Chart reviewed: BMI 46.6 kg/m2, Agustin 18, WBC 7.98k/uL, H/H 8.3/27.0, Plt 168. Patient interviewed, patient able to ambulate minimally, spends majority of time in bed. Denies pain/tenderness of backside but has been noticing blood spot on bed/sheets when she gets up. Denies pain/tenderness in lower extremities, endorses feelings of "heaviness." Patient H/O pancreatic mass (benign as per patient) s/p Whipple in 2011 who presented to the ED sent in from her PCP office due to abnormal labs and weakness. Patient found with microcytic anemia to 3.6 s/p PRBC transfusion with now H/H stable. Complicated by ascites/anasarca s/p diagnostic (5/16) and therapeutic paracentesis (5/19) requiring diuresis now pending biopsy via EUS for RP mass. Patient followed by Surgery for biopsy of celiac and SMA soft tissue density, Gastroenterology for RP mass.

## 2020-05-26 NOTE — ADVANCED PRACTICE NURSE CONSULT - RECOMMEDATIONS
Recommend follow up care at [facility]: [phone]. Address: [address] [place] [address].   Recommend visiting nurse services for topical management of sacrum stage 2 pressure injury, difficult location for patient to treat independently.  Nutrition consult, patient with anasarca, partial thickness sacral pressure injury; at risk for further deterioration due to limited mobility.    Topical Recommendations:  Risk for moisture associated dermatitis beneath abdominal pannus- cleanse intertriginous folds with luke-warm soap and water, dry well. Apply Interdry textile sheeting, under intertriginous folds leaving 2 inches exposed at ends to wick, remove to wash & dry affected area, then replace. Individual sheeting may be used for up to 5 days unless soiled.     Sacral fold extending ot bilateral upper inner buttocks- Cleanse wound and periwound skin with SAF-clens, rinse with NS, pat dry. Apply Liquid barrier film to periwound skin. Apply Silicone foam with border. Change daily.    Continue low air loss bed therapy, continue heel elevation with Z-flex fluidized positioning boots, continue to turn & reposition q2h with Z-angelica fluidized positioning device, continue moisture management as recommended above & single breathable pad, continue measures to decrease friction/shear/pressure.     Continue with nutritional support as per dietary/orders.    Findings and plan discussed with patient, RN and primary team.

## 2020-05-26 NOTE — PROGRESS NOTE ADULT - PROBLEM SELECTOR PLAN 1
CT A/P Indeterminate hyper enhancing lesions in the right hepatic lobe measuring 1.2 cm and 0.5 cm. CA 19-9 unremarkable but CEA and  elevated  - GI planning for possible EUS with biopsy this week  c anesthesia recs appreciated   - Trying to obtain records CT A/P Indeterminate hyper enhancing lesions in the right hepatic lobe. MR Liver show hepatic lesions c/w hemangioma and Perivascular soft tissue encasing celiac axis/SMA. CA 19-9 unremarkable but CEA and  elevated.  - f/u EUS w/ bx; appreciate GI recs  - f/u TTG, vitamin D level, stool fat and elastase; quant TB (indeterminate), acid fast culture from ascitic fluid (NGTD), total IgA elevated  - Trying to obtain records CT A/P Indeterminate hyper enhancing lesions in the right hepatic lobe. MR Liver show hepatic lesions c/w hemangioma and Perivascular soft tissue encasing celiac axis/SMA. CA 19-9 unremarkable but CEA and  elevated.  - EUS showed no discernible mass; node bx, will review images w/ radiology  - f/u TTG, vitamin D level, stool fat and elastase; quant TB (indeterminate), acid fast culture from ascitic fluid (NGTD), total IgA elevated  - Trying to obtain records CT A/P Indeterminate hyper enhancing lesions in the right hepatic lobe. MR Liver show hepatic lesions c/w hemangioma and Perivascular soft tissue encasing celiac axis/SMA. CA 19-9 unremarkable but CEA and  elevated.  - EUS showed no discernible mass; node was biopsied, will review images w/ radiology   - f/u TTG, vitamin D level, stool fat and elastase; quant TB (indeterminate), acid fast culture from ascitic fluid (NGTD), total IgA elevated  - Trying to obtain records from [facility] re: prior Whipple

## 2020-05-27 DIAGNOSIS — L89.152 PRESSURE ULCER OF SACRAL REGION, STAGE 2: ICD-10-CM

## 2020-05-27 LAB
ALBUMIN SERPL ELPH-MCNC: 1.8 G/DL — LOW (ref 3.3–5)
ALP SERPL-CCNC: 124 U/L — HIGH (ref 40–120)
ALT FLD-CCNC: 31 U/L — SIGNIFICANT CHANGE UP (ref 4–33)
ANION GAP SERPL CALC-SCNC: 5 MMO/L — LOW (ref 7–14)
AST SERPL-CCNC: 36 U/L — HIGH (ref 4–32)
BILIRUB SERPL-MCNC: 2.1 MG/DL — HIGH (ref 0.2–1.2)
BUN SERPL-MCNC: 12 MG/DL — SIGNIFICANT CHANGE UP (ref 7–23)
CALCIUM SERPL-MCNC: 7.3 MG/DL — LOW (ref 8.4–10.5)
CHLORIDE SERPL-SCNC: 102 MMOL/L — SIGNIFICANT CHANGE UP (ref 98–107)
CO2 SERPL-SCNC: 33 MMOL/L — HIGH (ref 22–31)
CREAT SERPL-MCNC: 0.73 MG/DL — SIGNIFICANT CHANGE UP (ref 0.5–1.3)
FAT STL QN: NORMAL — SIGNIFICANT CHANGE UP
FAT STL QN: NORMAL — SIGNIFICANT CHANGE UP
FOLATE SERPL-MCNC: 12.6 NG/ML — SIGNIFICANT CHANGE UP (ref 4.7–20)
GLUCOSE SERPL-MCNC: 77 MG/DL — SIGNIFICANT CHANGE UP (ref 70–99)
HCT VFR BLD CALC: 24 % — LOW (ref 34.5–45)
HGB BLD-MCNC: 7.3 G/DL — LOW (ref 11.5–15.5)
MAGNESIUM SERPL-MCNC: 1.8 MG/DL — SIGNIFICANT CHANGE UP (ref 1.6–2.6)
MCHC RBC-ENTMCNC: 26.6 PG — LOW (ref 27–34)
MCHC RBC-ENTMCNC: 30.4 % — LOW (ref 32–36)
MCV RBC AUTO: 87.6 FL — SIGNIFICANT CHANGE UP (ref 80–100)
NRBC # FLD: 0 K/UL — SIGNIFICANT CHANGE UP (ref 0–0)
PHOSPHATE SERPL-MCNC: 2.6 MG/DL — SIGNIFICANT CHANGE UP (ref 2.5–4.5)
PLATELET # BLD AUTO: 163 K/UL — SIGNIFICANT CHANGE UP (ref 150–400)
PMV BLD: 10.9 FL — SIGNIFICANT CHANGE UP (ref 7–13)
POTASSIUM SERPL-MCNC: 4 MMOL/L — SIGNIFICANT CHANGE UP (ref 3.5–5.3)
POTASSIUM SERPL-SCNC: 4 MMOL/L — SIGNIFICANT CHANGE UP (ref 3.5–5.3)
PROT SERPL-MCNC: 5.2 G/DL — LOW (ref 6–8.3)
RBC # BLD: 2.74 M/UL — LOW (ref 3.8–5.2)
RBC # FLD: 30.5 % — HIGH (ref 10.3–14.5)
RETICS #: 45 K/UL — SIGNIFICANT CHANGE UP (ref 25–125)
RETICS/RBC NFR: 1.6 % — SIGNIFICANT CHANGE UP (ref 0.5–2.5)
SODIUM SERPL-SCNC: 140 MMOL/L — SIGNIFICANT CHANGE UP (ref 135–145)
T4 FREE SERPL-MCNC: 1.56 NG/DL — SIGNIFICANT CHANGE UP (ref 0.9–1.8)
TSH SERPL-MCNC: 16.01 UIU/ML — HIGH (ref 0.27–4.2)
VIT B12 SERPL-MCNC: 1055 PG/ML — HIGH (ref 200–900)
WBC # BLD: 8 K/UL — SIGNIFICANT CHANGE UP (ref 3.8–10.5)
WBC # FLD AUTO: 8 K/UL — SIGNIFICANT CHANGE UP (ref 3.8–10.5)

## 2020-05-27 PROCEDURE — 99255 IP/OBS CONSLTJ NEW/EST HI 80: CPT | Mod: GC

## 2020-05-27 PROCEDURE — 99232 SBSQ HOSP IP/OBS MODERATE 35: CPT | Mod: GC

## 2020-05-27 PROCEDURE — 99233 SBSQ HOSP IP/OBS HIGH 50: CPT | Mod: GC

## 2020-05-27 RX ORDER — IRON SUCROSE 20 MG/ML
200 INJECTION, SOLUTION INTRAVENOUS ONCE
Refills: 0 | Status: COMPLETED | OUTPATIENT
Start: 2020-05-27 | End: 2020-05-27

## 2020-05-27 RX ADMIN — ALBUTEROL 2 PUFF(S): 90 AEROSOL, METERED ORAL at 16:09

## 2020-05-27 RX ADMIN — Medication 1 CAPSULE(S): at 09:14

## 2020-05-27 RX ADMIN — Medication 1 CAPSULE(S): at 16:33

## 2020-05-27 RX ADMIN — IRON SUCROSE 110 MILLIGRAM(S): 20 INJECTION, SOLUTION INTRAVENOUS at 16:08

## 2020-05-27 RX ADMIN — Medication 325 MILLIGRAM(S): at 11:34

## 2020-05-27 RX ADMIN — Medication 1 CAPSULE(S): at 11:34

## 2020-05-27 NOTE — CONSULT NOTE ADULT - SUBJECTIVE AND OBJECTIVE BOX
HPI:  49 year-old female with history of pancreatic mass (benign as per patient)  status-post resection in 2011 (s/p whipple) who presented to the ED sent in from her PCP office due to abnormal labs and weakness. Patient stated that for the past year she has been experience chronic fatigue, however, for the past three days has been feeling significantly worse, to the point that she was unable to ambulate on her own. She has also noticed diffuse swelling of the abdomen and bilateral lower extremities. Patient visited a new PCP yesterday (first doctor's visit in multiple years), and had blood work done which showed leukocytosis of 20; hb 3.9/hct 15, Cr 1.68 and  transaminitis. In regards to her pancreatic mass, patient said it was discovered when she was pregnant with her second child, and that the mass was benign, but was involved with multiple organs so in addition to resection of the pancreatic head, they had to remove the GB, as well as parts of the  stomach and duodenum. She stated the procedure was done at [facility], however she never had follow-up because she relocated and "could not find a doctor." She denied CP, lightheadness, nausea, vomiting, diarrhea, constipation, melena, hematochezia, hematemesis. She did however note decreased appetite and PO intake. (15 May 2020 18:14)      PAST MEDICAL & SURGICAL HISTORY:  Anemia  Asthma  History of pancreatic surgery: Whipple      Review of Systems:   CONSTITUTIONAL: No fever, weight loss, or fatigue  EYES: No eye pain, visual disturbances, or discharge  ENMT:  No difficulty hearing, tinnitus, vertigo; No sinus or throat pain  NECK: No pain or stiffness  BREASTS: No pain, masses, or nipple discharge  RESPIRATORY: No cough, wheezing, chills or hemoptysis; No shortness of breath  CARDIOVASCULAR: No chest pain, palpitations, dizziness, or leg swelling  GASTROINTESTINAL: No abdominal or epigastric pain. No nausea, vomiting, or hematemesis; No diarrhea or constipation. No melena or hematochezia.  GENITOURINARY: No dysuria, frequency, hematuria, or incontinence  NEUROLOGICAL: No headaches, memory loss, loss of strength, numbness, or tremors  SKIN: No itching, burning, rashes, or lesions   LYMPH NODES: No enlarged glands  ENDOCRINE: No heat or cold intolerance; No hair loss  MUSCULOSKELETAL: No joint pain or swelling; No muscle, back, or extremity pain  PSYCHIATRIC: No depression, anxiety, mood swings, or difficulty sleeping  HEME/LYMPH: No easy bruising, or bleeding gums  ALLERY AND IMMUNOLOGIC: No hives or eczema    Allergies    IV Contrast (Unknown)  Seafood (Anaphylaxis)    Intolerances    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, EtOH, or illicit drug use, Housewife, 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    FAMILY HISTORY:  FH: ovarian cancer: Sister  FH: hypertension: Father  FH: type 2 diabetes: Mother and Father      MEDICATIONS  (STANDING):  ferrous    sulfate 325 milliGRAM(s) Oral daily  lidocaine 1% Injectable 20 milliLiter(s) Local Injection once  pancrelipase  (CREON 24,000 Lipase Units) 1 Capsule(s) Oral three times a day with meals    MEDICATIONS  (PRN):  ALBUTerol    90 MICROgram(s) HFA Inhaler 2 Puff(s) Inhalation every 6 hours PRN Shortness of Breath and/or Wheezing  benzocaine 15 mG/menthol 3.6 mG (Sugar-Free) Lozenge 1 Lozenge Oral every 6 hours PRN Sore Throat  sodium chloride 0.65% Nasal 1 Spray(s) Both Nostrils daily PRN dry nose        CAPILLARY BLOOD GLUCOSE        I&O's Summary    26 May 2020 07:01  -  27 May 2020 07:00  --------------------------------------------------------  IN: 360 mL / OUT: 1000 mL / NET: -640 mL    27 May 2020 07:01  -  27 May 2020 20:34  --------------------------------------------------------  IN: 400 mL / OUT: 500 mL / NET: -100 mL    Vital Signs Last 24 Hrs  T(C): 36.4 (27 May 2020 17:40), Max: 36.9 (26 May 2020 22:25)  T(F): 97.6 (27 May 2020 17:40), Max: 98.5 (26 May 2020 22:25)  HR: 74 (27 May 2020 17:40) (67 - 88)  BP: 115/58 (27 May 2020 17:40) (112/60 - 126/72)  BP(mean): --  RR: 18 (27 May 2020 17:40) (17 - 18)  SpO2: 99% (27 May 2020 17:40) (97% - 99%)    PHYSICAL EXAM:  GENERAL: NAD, Sitting in bed  HEENT: NC/AT, Slightly dry mucous membranes  NECK: Supple  CHEST/LUNG: Respirations nonlabored  HEART: RRR; +S1/S2  ABDOMEN: Soft, + Abdominal distentions  EXTREMITIES: + B/L LE edema  NEUROLOGY: Awake and alert, Answering questions and following commands appropriately  SKIN: Warm and dry  PSYCH: Calm and cooperative    LABS:                        7.3    8.00  )-----------( 163      ( 27 May 2020 06:04 )             24.0     05-27    140  |  102  |  12  ----------------------------<  77  4.0   |  33<H>  |  0.73    Ca    7.3<L>      27 May 2020 06:04  Phos  2.6     05-27  Mg     1.8     05-27    TPro  5.2<L>  /  Alb  1.8<L>  /  TBili  2.1<H>  /  DBili  x   /  AST  36<H>  /  ALT  31  /  AlkPhos  124<H>  05-27    RADIOLOGY & ADDITIONAL TESTS:  Studies reviewed. HPI:  Patient is a 50 y/o F w/ a PMHx of a pancreatic pseudo-papillary tumor s/p resection/whipple in 2011 who initially presented with worsening weakness and edema for ~ 2 months, found to have profound anemia, SCOTT, and hypoalbuminemia, and was admitted to Medicine for further management. During patient's hospitalization, has been given a total of 4U pRBCs (last on 5/16). A CT C/A/P was obtained on 5/17 which showed a confluent RP soft tissue density surrounding the celiac axis and SMA, concerning for malignancy, indeterminate hyperenhancing lesions in the right hepatic lobe, pleural effusions, anasarca, and large abdominal pelvic ascites. She underwent a diagnostic paracentesis on 5/16 and cytology was negative for malignant cells. She underwent a therapeutic paracentesis on 5/19 in which ~ 10.5L of fluid was drained. MRI Abdomen (5/20) showed 2 small hepatic lesions consistent with hemangioma, no hepatic metastasis, severe hepatic steatosis, and persistent large volume ascites. Interestingly, the perivascular soft tissue density encasing the celiac axis/SMA was better appreciated on CT. This soft tissue density was unable to be accessed by IR and so GI and Surg Onc were consulted for potential biopsy. Patient underwent an upper EUS on 5/26 which showed a post-Whipple anatomy and a nodule at the anastomosis which was biopsied. Interestingly, a EUS was performed without any ismael-celiac/SMA soft tissue masses seen. Tumor markers checked during admission revealed: CA-125 = 239, CEA = 18.5, CA 19-9 = < 2. Given concern for an underlying malignancy, Oncology was consulted for further evaluation.    Patient seen in the late afternoon. She reports that her strength is slightly better today compared to the past few days. She is able to walk a short distance with a walker. On ROS, she endorses generalized fatigue, but denies any chest pain, shortness of breath, vomiting, abdominal pain, melena, or hematochezia. Patient was afebrile overnight.    Pathology report from 2011 was reviewed. She underwent pancreatic tumor resection which involved stomach/antrum partial resection and ileum and colon resection. Resected pancreatic, supra-pancreatic, ileum/colon, and stomach LNs were benign. Pathology showed a solid pseudo-papillary tumor (~ 12.5cm) which was adherent to small intestine. No invasion into adjacent structures were identified.       PAST MEDICAL & SURGICAL HISTORY:  Anemia  Asthma  History of pancreatic surgery: [place]      Review of Systems:   CONSTITUTIONAL: + Generalized weakness, No fever  EYES: No eye pain or discharge  ENMT:  No sinus or throat pain  NECK: No pain or stiffness  RESPIRATORY: No shortness of breath or cough  CARDIOVASCULAR: + LE edema, No chest pain or palpitations  GASTROINTESTINAL: + Abdominal distention, No vomiting, melena, or hematochezia  GENITOURINARY: No dysuria or hematuria  NEUROLOGICAL: No headache or syncope  SKIN: No itching or rash  MUSCULOSKELETAL: No joint pain or back pain      Allergies    IV Contrast (Unknown)  Seafood (Anaphylaxis)    Intolerances    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, EtOH, or illicit drug use, Housewife, 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    FAMILY HISTORY:  FH: ovarian cancer: Sister  FH: hypertension: Father  FH: type 2 diabetes: Mother and Father      MEDICATIONS  (STANDING):  ferrous    sulfate 325 milliGRAM(s) Oral daily  lidocaine 1% Injectable 20 milliLiter(s) Local Injection once  pancrelipase  (CREON 24,000 Lipase Units) 1 Capsule(s) Oral three times a day with meals    MEDICATIONS  (PRN):  ALBUTerol    90 MICROgram(s) HFA Inhaler 2 Puff(s) Inhalation every 6 hours PRN Shortness of Breath and/or Wheezing  benzocaine 15 mG/menthol 3.6 mG (Sugar-Free) Lozenge 1 Lozenge Oral every 6 hours PRN Sore Throat  sodium chloride 0.65% Nasal 1 Spray(s) Both Nostrils daily PRN dry nose        CAPILLARY BLOOD GLUCOSE        I&O's Summary    26 May 2020 07:01  -  27 May 2020 07:00  --------------------------------------------------------  IN: 360 mL / OUT: 1000 mL / NET: -640 mL    27 May 2020 07:01  -  27 May 2020 20:34  --------------------------------------------------------  IN: 400 mL / OUT: 500 mL / NET: -100 mL    Vital Signs Last 24 Hrs  T(C): 36.4 (27 May 2020 17:40), Max: 36.9 (26 May 2020 22:25)  T(F): 97.6 (27 May 2020 17:40), Max: 98.5 (26 May 2020 22:25)  HR: 74 (27 May 2020 17:40) (67 - 88)  BP: 115/58 (27 May 2020 17:40) (112/60 - 126/72)  BP(mean): --  RR: 18 (27 May 2020 17:40) (17 - 18)  SpO2: 99% (27 May 2020 17:40) (97% - 99%)    PHYSICAL EXAM:  GENERAL: NAD, Sitting in bed  HEENT: NC/AT, Slightly dry mucous membranes  NECK: Supple  CHEST/LUNG: Respirations nonlabored  HEART: RRR; +S1/S2  ABDOMEN: Soft, + Abdominal distentions  EXTREMITIES: + B/L LE edema  NEUROLOGY: Awake and alert, Answering questions and following commands appropriately  SKIN: Warm and dry  PSYCH: Calm and cooperative    LABS:                        7.3    8.00  )-----------( 163      ( 27 May 2020 06:04 )             24.0     05-27    140  |  102  |  12  ----------------------------<  77  4.0   |  33<H>  |  0.73    Ca    7.3<L>      27 May 2020 06:04  Phos  2.6     05-27  Mg     1.8     05-27    TPro  5.2<L>  /  Alb  1.8<L>  /  TBili  2.1<H>  /  DBili  x   /  AST  36<H>  /  ALT  31  /  AlkPhos  124<H>  05-27    RADIOLOGY & ADDITIONAL TESTS:  Studies reviewed.

## 2020-05-27 NOTE — PROGRESS NOTE ADULT - PROBLEM SELECTOR PLAN 4
- Pt with pulmonary nodules not on CT scan, could possibly be malignancy  - CT chest w/ pleural effusion b/l patchy opacities Pt presented w/ anemia to 3.8 and chronic fatigue. Microcytic. Now improving s/p 4u PRBCs. Suspect iron deficiency vs anemia of chronic illness. Low suspicion for GI Bleed. Concerning for malignancy given she had pancreatic mass (unclear hx). Low suspicion for hemolysis.  - iron lvl decreased; supplemented  - Hgb stable   - monitor HgB and transfuse if <7  - Consider hematology evaluation for BM biopsy if nodule biopsy inconclusive

## 2020-05-27 NOTE — PROVIDER CONTACT NOTE (OTHER) - ASSESSMENT
VS stable, new arrow site causing patient 4 or 5 out of 10 pain, appears infiltrated. new peripheral IV inserted, #22, per nursing education RN can remove arrow

## 2020-05-27 NOTE — CHART NOTE - NSCHARTNOTEFT_GEN_A_CORE
Source: Patient [X ]  via phone ([phone])  Family [ ]     other [X ] electronic chart, MD   Diet : Diet, Regular:   DASH/TLC {Sodium & Cholesterol Restricted} (DASH) (05-26-20 @ 12:47)    Nutrition follow-up severe malnutrition/consult. Nutrition consult received for 2/2 anasarca (significant LLE and abdominal swelling) partial thickness sacral pressure injury and patient at risk for further deterioration due to limited mobility per chart review. Noted wound care assessment (5/26) and patient diagnosed with pressure injury of sacral region- stage 2. Unable to have face to face encounter with patient to perform nutrition interview and/or nutrition-focused physical exam due to visitation limitations in efforts to mitigate the spread of COVID-19. Collateral information obtained from patient and extensive chart review. Spoke to patient via phone. Patient reports overall appetite improving, however, she felt tired since procedure yesterday and did not eat much at breakfast today. Usually consuming >50-75% po intake. She was consuming Ensure X 1 per day which was d/c'd but patient requesting for same and trial of Ensure Pudding, which she would like to alternate between po supplement Ensure Enlive vs. Ensure Pudding. Offered PO supplement No Carb Prosource (15 grams protein/ 30 mL packet.) to increase protein intake to promote wound healing Patient denies any nausea/vomiting/diarrhea/constipation or difficulty chewing and swallowing. + BM on 5/27. Spoke to physician and verbalized diet recommendations. Also to consider liberalizing diet to Low sodium diet and d/c DASH/TLC (cholesterol and sodium restricted) diet.       Current Weight: Weight (kg): 97.6 (05-26 @ 08:49) 5/22-97.1kg 5/15 97.6kg Weight change likely fluid related, patient w/ anasarca and +3       Pertinent Medications: ferrous    sulfate  lactated ringers.  pancrelipase  (CREON 24,000 Lipase Units)    Pertinent Labs:  05-27 Na140 mmol/L Glu 77 mg/dL K+ 4.0 mmol/L Cr  0.73 mg/dL BUN 12 mg/dL 05-27 Phos 2.6 mg/dL 05-27 Alb 1.8 g/dL<L>    Edema: B/L lower extremities     Skin: pressure injury-Sacral fold extending to bilateral upper inner buttocks stage 2.    Estimated Needs:   [ X] no change since previous assessment  [ ] recalculated:     Previous Nutrition Diagnosis:      [X] Malnutrition, Severe 5/20/20     Nutrition Diagnosis is [ X] ongoing  [ ] resolved [ ] not applicable     New Nutrition Diagnosis: Increase Nutrient Needs (protein, kcal, micronutrients), related to increased demand for wound healing as evidenced by impaired skin integrity, pressure injury and anasarca.     Additional Recommendations:     1. As discussed with MD, suggest liberalizing diet to Low Sodium.   2. Please add PO supplement Ensure Enlive 240mls 1x daily (350 kcal, 20g protein), Ensure Pudding 2x daily (340 kcals, 8g protein). and No Carb Prosource (15 grams protein/ 30 mL packet.) x 2 for optimal nutrition and increased demand for wound healing.   3. Monitor weights, labs, BM's, skin integrity, p.o. intake. RN to please monitor po intake in flowsheet.   4. Consider addition of Multivitamin with minerals for micronutrient coverage.   5. Please Encourage po intake, assist with meals and menu selections, provide alternatives PRN. Source: Patient [X ]  via phone ([phone])  Family [ ]     other [X ] electronic chart, MD   Diet : Diet, Regular:   DASH/TLC {Sodium & Cholesterol Restricted} (DASH) (05-26-20 @ 12:47)    Nutrition follow-up severe malnutrition/consult. Nutrition consult received for 2/2 anasarca (significant LLE and abdominal swelling) partial thickness sacral pressure injury and patient at risk for further deterioration due to limited mobility per chart review. Noted wound care assessment (5/26) and patient diagnosed with pressure injury of sacral region- stage 2. Unable to have face to face encounter with patient to perform nutrition interview and/or nutrition-focused physical exam due to visitation limitations in efforts to mitigate the spread of COVID-19. Collateral information obtained from patient and extensive chart review. Spoke to patient via phone. Patient reports overall appetite improving, however, she felt tired since procedure yesterday and did not eat much at breakfast today. Usually consuming >50-75% po intake. She was consuming Ensure X 1 per day which was d/c'd but patient requesting for same and trial of Ensure Pudding, which she would like to alternate between po supplement Ensure Enlive vs. Ensure Pudding. Offered PO supplement No Carb Prosource (15 grams protein/ 30 mL packet.) to increase protein intake to promote wound healing Patient denies any nausea/vomiting/diarrhea/constipation or difficulty chewing and swallowing. + BM on 5/27. Emphasized importance of good po intake and having proteins at each meal and snacks, and to have po supplement between meals for optimal nutritional intake. Patient receptive to information provided. Verbalized good understanding and no further nutrition-related questions/concerns voiced at this time. RDN remains available, patient made aware. Spoke to physician and verbalized diet recommendations. Also to consider liberalizing diet to Low sodium diet and d/c DASH/TLC (cholesterol and sodium restricted) diet.       Current Weight: Weight (kg): 97.6 (05-26 @ 08:49) 5/22-97.1kg 5/15 97.6kg Weight change likely fluid related, patient w/ anasarca and +3       Pertinent Medications: ferrous    sulfate  lactated ringers.  pancrelipase  (CREON 24,000 Lipase Units)    Pertinent Labs:  05-27 Na140 mmol/L Glu 77 mg/dL K+ 4.0 mmol/L Cr  0.73 mg/dL BUN 12 mg/dL 05-27 Phos 2.6 mg/dL 05-27 Alb 1.8 g/dL<L>    Edema: B/L lower extremities     Skin: pressure injury-Sacral fold extending to bilateral upper inner buttocks stage 2.    Estimated Needs:   [ X] no change since previous assessment  [ ] recalculated:     Previous Nutrition Diagnosis:      [X] Malnutrition, Severe 5/20/20     Nutrition Diagnosis is [ X] ongoing  [ ] resolved [ ] not applicable     New Nutrition Diagnosis: Increase Nutrient Needs (protein, kcal, micronutrients), related to increased demand for wound healing as evidenced by impaired skin integrity, pressure injury and anasarca.     Additional Recommendations:     1. As discussed with MD, suggest liberalizing diet to Low Sodium.   2. Please add PO supplement Ensure Enlive 240mls 1x daily (350 kcal, 20g protein), Ensure Pudding 2x daily (340 kcals, 8g protein). and No Carb Prosource (15 grams protein/ 30 mL packet.) x 2 for optimal nutrition and increased demand for wound healing.   3. Monitor weights, labs, BM's, skin integrity, p.o. intake. RN to please monitor po intake in flowsheet.   4. Consider addition of Multivitamin with minerals for micronutrient coverage.   5. Please Encourage po intake, assist with meals and menu selections, provide alternatives PRN.

## 2020-05-27 NOTE — PROGRESS NOTE ADULT - ASSESSMENT
Impression:     1. Possible retroperitoneal mass seen on CT, questionable on MR, and not seen on EUS  2. 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 SAAG is elevated but overall picture seems more consistent with low oncotic state.  3. Abnormal liver tests  4. Iron deficiency anemia, thrombocytopenia. Suggestive of malabsorption.  5. History of Whipple for pancreas mass (per patient, benign mass, unclear what it was, no records)    Recommendation:  - no further endoscopic intervention warranted currently as nothing seen on EUS to biopsy  - would consider bone marrow biopsy given continued concern for lymphoma or malignancy  - would check for malabsorptive processes: stool alpha-1-antitrypsin as well as serum level (check for A1AT deficiency) stool GI PCR (check for tropical sprue, Whipple's), stool fecal elastase level (check for pancreatic insufficiency), ascitic fluid adenosine deaminase (to check for TB in ascites), TTG/IgA to check for Celiacs  - fluid management per primary team

## 2020-05-27 NOTE — CONSULT NOTE ADULT - ATTENDING COMMENTS
49 Y F with h/o whipple 9 years ago for a pancreatic tumor- claims benign  Now admitted with 2 month h/o abdominal distension with fatigue and weakness  CT abd- reveals large ascites, post whipple anatomy, patent portal vein  Unclear etiology of ascites  Soft tissue mass encasing celiac and SMA trunk at root of mesentery  Plan:  No plans of surgical intervention at this time  Would need to review path and operative notes from [facility] from 9 years ago  Consult Hepatology   If clinically a biopsy is indicated consult GI for possible EUS guided biopsy. A surgical biopsy is not warranted at this time.  - I have discussed the diagnosis and therapeutic plan with the patient in detail. Patient expressed verbal understanding and willingness to proceed with proposed plan. All questions answered  -Thank you for allowing me to take care of your patient. I will follow the patient with you.    Regards  Kieran Rivas MD, FACS, FICS  - [employer] [facility]  Division of Surgical Oncology, Department of Surgery  [address] [address]    [address] [facility], [facility] [address]    [phone] [address]  Ph: [phone]  Fax: [phone]
50 y/o F with PMH of a pancreatic pseudo-papillary tumor s/p resection/whipple in 2011 who initially presented with worsening weakness and edema for ~ 2 months, found to have profound anemia, SCOTT, and hypoalbuminemia, and was admitted to Medicine for further management. Workup during hospitalization was notable for a confluent RP soft tissue density surrounding the celiac axis and SMA on CT scan; however, this was not seen on upper EUS. Patient is s/p paracentesis with cytology negative for malignant cells. She is also s/p biopsy of anastomosis nodule seen on upper EUS. Although she has elevated tumor markers this is also in the setting of severe malnutrition with low albumin and severe iron deficiency anemia which may be a result of long standing absorptive issues related to her previous surgery. Await biopsy and will refer her to [facility] following discharge for further management of anemia and evaluation for additional treatment if appropriate.
Patient seen and examined with the GI fellow. I agree with the above assessment and plan. Thank you for allowing us to care for your patient.    Pt with RP mass of unclear etiology. Pt needs optimization prior to possible procedure.

## 2020-05-27 NOTE — PROGRESS NOTE ADULT - PROBLEM SELECTOR PLAN 2
Anasarca (significant LLE and abdominal swelling) in the setting of decreased oncotic pressure/ hypoalbuminemia and mild hypoproteinemia  - dc Lasix 2/2 contraction alkalosis   - s/p therapeutic para; cytopath shows no malignant cells  - f/u medical records from [place]  - Duplex w/ no DVT Anasarca (significant LLE and abdominal swelling) in the setting of decreased oncotic pressure/ hypoalbuminemia and mild hypoproteinemia  - dc Lasix 2/2 contraction alkalosis   - s/p therapeutic para; cytopath shows no malignant cells  - f/u medical records from [facility]   - Duplex w/ no DVT Anasarca (significant LLE and abdominal swelling) in the setting of decreased oncotic pressure/ hypoalbuminemia and mild hypoproteinemia  - hold off Lasix 2/2 contraction alkalosis   - s/p therapeutic para; cytopath shows no malignant cells  - Duplex w/ no DVT

## 2020-05-27 NOTE — PROVIDER CONTACT NOTE (OTHER) - SITUATION
new arrow site causing patient 4 or 5 out of 10 pain, appears infiltrated. new peripheral IV inserted, #22, okay to remove arrow?

## 2020-05-27 NOTE — PROGRESS NOTE ADULT - SUBJECTIVE AND OBJECTIVE BOX
PROGRESS NOTE:     Patient is a 49y old  Female who presents with a chief complaint of Anemia (26 May 2020 06:35)      SUBJECTIVE / OVERNIGHT EVENTS:        MEDICATIONS  (STANDING):  ferrous    sulfate 325 milliGRAM(s) Oral daily  lactated ringers. 1000 milliLiter(s) (30 mL/Hr) IV Continuous <Continuous>  lidocaine 1% Injectable 20 milliLiter(s) Local Injection once  pancrelipase  (CREON 24,000 Lipase Units) 1 Capsule(s) Oral three times a day with meals    MEDICATIONS  (PRN):  ALBUTerol    90 MICROgram(s) HFA Inhaler 2 Puff(s) Inhalation every 6 hours PRN Shortness of Breath and/or Wheezing  benzocaine 15 mG/menthol 3.6 mG (Sugar-Free) Lozenge 1 Lozenge Oral every 6 hours PRN Sore Throat  sodium chloride 0.65% Nasal 1 Spray(s) Both Nostrils daily PRN dry nose      CAPILLARY BLOOD GLUCOSE        I&O's Summary    25 May 2020 07:01  -  26 May 2020 07:00  --------------------------------------------------------  IN: 240 mL / OUT: 1800 mL / NET: -1560 mL    26 May 2020 07:01  -  27 May 2020 06:37  --------------------------------------------------------  IN: 240 mL / OUT: 700 mL / NET: -460 mL        PHYSICAL EXAM:  Vital Signs Last 24 Hrs  T(C): 36.9 (26 May 2020 22:25), Max: 36.9 (26 May 2020 22:25)  T(F): 98.5 (26 May 2020 22:25), Max: 98.5 (26 May 2020 22:25)  HR: 88 (26 May 2020 22:25) (60 - 88)  BP: 126/72 (26 May 2020 22:25) (100/60 - 144/64)  BP(mean): --  RR: 18 (26 May 2020 22:25) (14 - 18)  SpO2: 99% (26 May 2020 22:25) (93% - 99%)    CONSTITUTIONAL: NAD, well-developed  RESPIRATORY: Normal respiratory effort; lungs are clear to auscultation bilaterally  CARDIOVASCULAR: Regular rate and rhythm, normal S1 and S2, no murmur/rub/gallop; No lower extremity edema; Peripheral pulses are 2+ bilaterally  ABDOMEN: Nontender to palpation, normoactive bowel sounds, no rebound/guarding; No hepatosplenomegaly  MUSCLOSKELETAL: no clubbing or cyanosis of digits; no joint swelling or tenderness to palpation  NEURO: CN 2-12 grossly intact, moves all limbs spontaneously  PSYCH: A+O to person, place, and time; affect appropriate    LABS:                        8.3    7.28  )-----------( 168      ( 26 May 2020 06:43 )             27.0     05-26    140  |  98  |  12  ----------------------------<  80  3.4<L>   |  32<H>  |  0.79    Ca    7.7<L>      26 May 2020 06:43  Phos  3.0     05-26  Mg     1.9     05-26    TPro  6.0  /  Alb  2.2<L>  /  TBili  2.4<H>  /  DBili  x   /  AST  42<H>  /  ALT  40<H>  /  AlkPhos  135<H>  05-26                RADIOLOGY & ADDITIONAL TESTS:  Results Reviewed:   Imaging Personally Reviewed:  Electrocardiogram Personally Reviewed:    COORDINATION OF CARE:  Care Discussed with Consultants/Other Providers [Y/N]:  Prior or Outpatient Records Reviewed [Y/N]: PROGRESS NOTE:     Patient is a 49y old  Female who presents with a chief complaint of Anemia (26 May 2020 06:35)      SUBJECTIVE / OVERNIGHT EVENTS:  - pt w/ c/o of softer stools  - denies abd pain, n/v, sob, cp  - naeo      MEDICATIONS  (STANDING):  ferrous    sulfate 325 milliGRAM(s) Oral daily  lactated ringers. 1000 milliLiter(s) (30 mL/Hr) IV Continuous <Continuous>  lidocaine 1% Injectable 20 milliLiter(s) Local Injection once  pancrelipase  (CREON 24,000 Lipase Units) 1 Capsule(s) Oral three times a day with meals    MEDICATIONS  (PRN):  ALBUTerol    90 MICROgram(s) HFA Inhaler 2 Puff(s) Inhalation every 6 hours PRN Shortness of Breath and/or Wheezing  benzocaine 15 mG/menthol 3.6 mG (Sugar-Free) Lozenge 1 Lozenge Oral every 6 hours PRN Sore Throat  sodium chloride 0.65% Nasal 1 Spray(s) Both Nostrils daily PRN dry nose      CAPILLARY BLOOD GLUCOSE        I&O's Summary    25 May 2020 07:01  -  26 May 2020 07:00  --------------------------------------------------------  IN: 240 mL / OUT: 1800 mL / NET: -1560 mL    26 May 2020 07:01  -  27 May 2020 06:37  --------------------------------------------------------  IN: 240 mL / OUT: 700 mL / NET: -460 mL        PHYSICAL EXAM:  Vital Signs Last 24 Hrs  T(C): 36.9 (26 May 2020 22:25), Max: 36.9 (26 May 2020 22:25)  T(F): 98.5 (26 May 2020 22:25), Max: 98.5 (26 May 2020 22:25)  HR: 88 (26 May 2020 22:25) (60 - 88)  BP: 126/72 (26 May 2020 22:25) (100/60 - 144/64)  BP(mean): --  RR: 18 (26 May 2020 22:25) (14 - 18)  SpO2: 99% (26 May 2020 22:25) (93% - 99%)    General: well appearing female, sitting up in bed  HEENT: neck supple  Cardiovascular: normal s1 s2 no grm  Respiratory: cta b/l   Abdominal: soft, distended non tense, no ttp  Extremities: 4+ pitting edema   Neurologic: non focal  Psych: awake alert answering questions appropriate    LABS:                        8.3    7.28  )-----------( 168      ( 26 May 2020 06:43 )             27.0     05-26    140  |  98  |  12  ----------------------------<  80  3.4<L>   |  32<H>  |  0.79    Ca    7.7<L>      26 May 2020 06:43  Phos  3.0     05-26  Mg     1.9     05-26    TPro  6.0  /  Alb  2.2<L>  /  TBili  2.4<H>  /  DBili  x   /  AST  42<H>  /  ALT  40<H>  /  AlkPhos  135<H>  05-26

## 2020-05-27 NOTE — CONSULT NOTE ADULT - ASSESSMENT
*** NOTE INCOMPLETE *** Patient is a 48 y/o F w/ a PMHx of a pancreatic pseudo-papillary tumor s/p resection/whipple in 2011 who initially presented with worsening weakness and edema for ~ 2 months, found to have profound anemia, SCOTT, and hypoalbuminemia, and was admitted to Medicine for further management. Workup during hospitalization was notable for a confluent RP soft tissue density surrounding the celiac axis and SMA on CT scan; however, this was not seen on upper EUS. Patient is s/p paracentesis with cytology negative for malignant cells. She is also s/p biopsy of anastomosis nodule seen on upper EUS. Oncology consulted for further evaluation.    # Concern for malignancy  - Patient initially p/w worsening weakness and edema for ~ 2 months and was ultimately found to have multiple lab abnormalities including profound anemia, SCOTT, and hypoalbuminemia.   - Workup during hospitalization was reviewed: CT C/A/P (5/17): Confluent RP soft tissue density surrounding the celiac axis and SMA, concerning for malignancy. Indeterminate hyperenhancing lesions in the right hepatic lobe. Pleural effusions, anasarca, and large abdominal pelvic ascites.   - MRI Abdomen (5/20): 2 small hepatic lesions consistent with hemangioma. No hepatic metastasis. Severe hepatic steatosis. Persistent large volume ascites.  - S/p diagnostic paracentesis on 5/16 with cytology negative for malignant cells. Interestingly, patient's fluid studies are consistent with a transudative process.  - S/p therapeutic paracentesis on 5/19 in which ~ 10.5L of fluid was drained.   - S/p upper EUS on 5/26 which showed a post-Whipple anatomy and a nodule at the anastomosis which was biopsied.   - Tumor markers: CA-125 = 239, CEA = 18.5, CA 19-9 = < 2  - Given history of extensive GI surgery, marked hypoalbuminemia, iron deficiency, and transudative ascites, it is unclear to what degree patient's symptoms are related to underlying malabsorptive process. However, given imaging findings, it is difficult to rule out malignancy in this case  - Will follow-up Pathology results  - Management of anemia as noted below  - Appreciate excellent care as per primary team     # Anemia  - Patient found to have a profound microcytic anemia on presentation (Hgb = 3.8, MCV = 69.0). She has received a total of 4U pRBCs (last on 5/16).  - Labs reviewed: Iron studies are consistent with iron deficiency anemia. LDH was elevated, though Haptoglobin was normal which argues against hemolysis. B12/Folate were WNL. Reticulocyte index noted to be inappropriately low (while on PO iron supplementation)  - Agree with IV iron supplementation as per primary team. Low reticulocyte count may be 2/2 iron deficiency.   - Please trend CBC and reticulocyte count daily    - Peripheral smear to be reviewed  - Further management as noted above    Elijah Hope, PGY4  Hematology-Oncology Fellow  Pager: [phone] / [phone] Patient is a 48 y/o F w/ a PMHx of a pancreatic pseudo-papillary tumor s/p resection/whipple in 2011 who initially presented with worsening weakness and edema for ~ 2 months, found to have profound anemia, SCOTT, and hypoalbuminemia, and was admitted to Medicine for further management. Workup during hospitalization was notable for a confluent RP soft tissue density surrounding the celiac axis and SMA on CT scan; however, this was not seen on upper EUS. Patient is s/p paracentesis with cytology negative for malignant cells. She is also s/p biopsy of anastomosis nodule seen on upper EUS. Oncology consulted for further evaluation.    # Concern for malignancy  - Patient initially p/w worsening weakness and edema for ~ 2 months and was ultimately found to have multiple lab abnormalities including profound anemia, SCOTT, and hypoalbuminemia.   - Workup during hospitalization was reviewed: CT C/A/P (5/17): Confluent RP soft tissue density surrounding the celiac axis and SMA, concerning for malignancy. Indeterminate hyperenhancing lesions in the right hepatic lobe. Pleural effusions, anasarca, and large abdominal pelvic ascites.   - MRI Abdomen (5/20): 2 small hepatic lesions consistent with hemangioma. No hepatic metastasis. Severe hepatic steatosis. Persistent large volume ascites.  - S/p diagnostic paracentesis on 5/16 with cytology negative for malignant cells. Interestingly, patient's fluid studies are consistent with a transudative process.  - S/p therapeutic paracentesis on 5/19 in which ~ 10.5L of fluid was drained.   - S/p upper EUS on 5/26 which showed a post-Whipple anatomy and a nodule at the anastomosis which was biopsied.   - Tumor markers: CA-125 = 239, CEA = 18.5, CA 19-9 = < 2  - Given history of extensive GI surgery, marked hypoalbuminemia, iron deficiency, and transudative ascites, it is unclear to what degree patient's symptoms are related to an underlying malabsorptive process. However, given imaging findings, it is difficult to rule out malignancy in this case  - Will follow-up Pathology results  - Management of anemia as noted below  - Appreciate excellent care as per primary team     # Anemia  - Patient found to have a profound microcytic anemia on presentation (Hgb = 3.8, MCV = 69.0). She has received a total of 4U pRBCs (last on 5/16).  - Labs reviewed: Iron studies are consistent with iron deficiency anemia. LDH was elevated, though Haptoglobin was normal which argues against hemolysis. B12/Folate were WNL. Reticulocyte index noted to be inappropriately low (while on PO iron supplementation)  - Agree with IV iron supplementation as per primary team. Low reticulocyte count may be 2/2 iron deficiency.   - Please trend CBC and reticulocyte count daily    - Peripheral smear to be reviewed  - Further management as noted above    Elijah Hope, PGY4  Hematology-Oncology Fellow  Pager: [phone] / [phone]

## 2020-05-27 NOTE — PROGRESS NOTE ADULT - PROBLEM SELECTOR PLAN 1
CT A/P Indeterminate hyper enhancing lesions in the right hepatic lobe. MR Liver show hepatic lesions c/w hemangioma and Perivascular soft tissue encasing celiac axis/SMA. CA 19-9 unremarkable but CEA and  elevated.  - EUS showed no discernible mass; node was biopsied, will review images w/ radiology   - f/u TTG, vitamin D level, stool fat and elastase; quant TB (indeterminate), acid fast culture from ascitic fluid (NGTD), total IgA elevated  - Trying to obtain records from [facility] re: prior Whipple CT A/P Indeterminate hyper enhancing lesions in the right hepatic lobe. MR Liver show hepatic lesions c/w hemangioma and Perivascular soft tissue encasing celiac axis/SMA. CA 19-9 unremarkable but CEA and  elevated  - EUS showed no discernible mass; node was biopsied  - images reviewed w/ rads; soft tissue infiltrating mesentery c/w confluent lymph nodes   - f/u TTG, vitamin D level, stool fat and elastase; quant TB (indeterminate), acid fast culture from ascitic fluid (NGTD), total IgA elevated  - Trying to obtain records from [facility] re: prior Whipple CT A/P Indeterminate hyper enhancing lesions in the right hepatic lobe. MR Liver show hepatic lesions c/w hemangioma and Perivascular soft tissue encasing celiac axis/SMA. CA 19-9 unremarkable but CEA and  elevated  - EUS showed no discernible mass; node was biopsied  - images reviewed w/ rads; soft tissue infiltrating mesentery c/w confluent lymph nodes   - f/u vitamin D level, stool fat and elastase; quant TB (indeterminate), acid fast culture from ascitic fluid (NGTD), total IgA elevated, TTG IgG elevated, TTG IgA wnl  - records obtained; 12.5cm pancreatic pseudopapillary tumor resected along w/ colon, stomach, and gall bladder; path and GI will review records

## 2020-05-27 NOTE — PROGRESS NOTE ADULT - PROBLEM SELECTOR PLAN 6
Likely in setting of hepatic congestion, ascites demonstrated on CT but no evidence of cirrhosis. Improving. Elevated indirect bili.  - Hep panel neg  - monitor DVT: Lovenox 40 SQ daily   Diet: Regular   COVID: neg x1  Call for PT re-eval

## 2020-05-27 NOTE — PROGRESS NOTE ADULT - SUBJECTIVE AND OBJECTIVE BOX
Chief Complaint:  Patient is a 49y old  Female who presents with a chief complaint of Anemia (27 May 2020 06:37)      Interval Events:     Allergies:  IV Contrast (Unknown)  Seafood (Anaphylaxis)      Hospital Medications:  ALBUTerol    90 MICROgram(s) HFA Inhaler 2 Puff(s) Inhalation every 6 hours PRN  benzocaine 15 mG/menthol 3.6 mG (Sugar-Free) Lozenge 1 Lozenge Oral every 6 hours PRN  ferrous    sulfate 325 milliGRAM(s) Oral daily  lactated ringers. 1000 milliLiter(s) IV Continuous <Continuous>  lidocaine 1% Injectable 20 milliLiter(s) Local Injection once  pancrelipase  (CREON 24,000 Lipase Units) 1 Capsule(s) Oral three times a day with meals  sodium chloride 0.65% Nasal 1 Spray(s) Both Nostrils daily PRN      PMHX/PSHX:  Anemia  Asthma  No pertinent past medical history  History of pancreatic surgery      Family history:  FH: ovarian cancer  FH: hypertension  FH: type 2 diabetes      ROS:     General:  No wt loss, fevers, chills, night sweats, fatigue,   Eyes:  Good vision, no reported pain  ENT:  No sore throat, pain, runny nose, dysphagia  CV:  No pain, palpitations, hypo/hypertension  Resp:  No dyspnea, cough, tachypnea, wheezing  GI:  See HPI  :  No pain, bleeding, incontinence, nocturia  Muscle:  No pain, weakness  Neuro:  No weakness, tingling, memory problems  Psych:  No fatigue, insomnia, mood problems, depression  Endocrine:  No polyuria, polydipsia, cold/heat intolerance  Heme:  No petechiae, ecchymosis, easy bruisability  Skin:  No rash, edema      PHYSICAL EXAM:     GENERAL:  Appears stated age, well-groomed, well-nourished, no distress  HEENT:  NC/AT,  conjunctivae clear, sclera -anicteric  CHEST:  Full & symmetric excursion, no increased effort, breath sounds clear  HEART:  Regular rhythm, S1, S2, no murmur/rub/S3/S4,  no edema  ABDOMEN:  Soft, non-tender, non-distended, normoactive bowel sounds,  no masses ,no hepato-splenomegaly,   EXTREMITIES:  no cyanosis,clubbing or edema  SKIN:  No rash/erythema/ecchymoses/petechiae/wounds/abscess/warm/dry  NEURO:  Alert, oriented    Vital Signs:  Vital Signs Last 24 Hrs  T(C): 36.7 (27 May 2020 06:55), Max: 36.9 (26 May 2020 22:25)  T(F): 98.1 (27 May 2020 06:55), Max: 98.5 (26 May 2020 22:25)  HR: 67 (27 May 2020 06:55) (60 - 88)  BP: 112/60 (27 May 2020 06:55) (100/60 - 144/64)  BP(mean): --  RR: 17 (27 May 2020 06:55) (14 - 18)  SpO2: 97% (27 May 2020 06:55) (93% - 99%)  Daily Height in cm: 144.78 (26 May 2020 08:49)    Daily     LABS:                        7.3    8.00  )-----------( 163      ( 27 May 2020 06:04 )             24.0     05-26    140  |  98  |  12  ----------------------------<  80  3.4<L>   |  32<H>  |  0.79    Ca    7.7<L>      26 May 2020 06:43  Phos  3.0     05-26  Mg     1.9     05-26    TPro  6.0  /  Alb  2.2<L>  /  TBili  2.4<H>  /  DBili  x   /  AST  42<H>  /  ALT  40<H>  /  AlkPhos  135<H>  05-26    LIVER FUNCTIONS - ( 26 May 2020 06:43 )  Alb: 2.2 g/dL / Pro: 6.0 g/dL / ALK PHOS: 135 u/L / ALT: 40 u/L / AST: 42 u/L / GGT: x                   Imaging: Chief Complaint:  Patient is a 49y old  Female who presents with a chief complaint of Anemia (27 May 2020 06:37)      Interval Events: PT feeling ok today, states swelling in legs is better.  She has an appetite.  She states prior to hospitalization she was eating fine but did not eat red meat.  She states she did take pancreatic enzymes in past for steatorrhea but would forget often.  Recently stool is formed (soft) without oil.      Allergies:  IV Contrast (Unknown)  Seafood (Anaphylaxis)      Hospital Medications:  ALBUTerol    90 MICROgram(s) HFA Inhaler 2 Puff(s) Inhalation every 6 hours PRN  benzocaine 15 mG/menthol 3.6 mG (Sugar-Free) Lozenge 1 Lozenge Oral every 6 hours PRN  ferrous    sulfate 325 milliGRAM(s) Oral daily  lactated ringers. 1000 milliLiter(s) IV Continuous <Continuous>  lidocaine 1% Injectable 20 milliLiter(s) Local Injection once  pancrelipase  (CREON 24,000 Lipase Units) 1 Capsule(s) Oral three times a day with meals  sodium chloride 0.65% Nasal 1 Spray(s) Both Nostrils daily PRN      PMHX/PSHX:  Anemia  Asthma  No pertinent past medical history  History of pancreatic surgery      Family history:  FH: ovarian cancer  FH: hypertension  FH: type 2 diabetes      ROS:     General:  No wt loss, fevers, chills, night sweats, fatigue,   Eyes:  Good vision, no reported pain  ENT:  No sore throat, pain, runny nose, dysphagia  CV:  No pain, palpitations, hypo/hypertension  Resp:  No dyspnea, cough, tachypnea, wheezing  GI:  See HPI  :  No pain, bleeding, incontinence, nocturia  Muscle:  No pain, weakness  Neuro:  No weakness, tingling, memory problems  Psych:  No fatigue, insomnia, mood problems, depression  Endocrine:  No polyuria, polydipsia, cold/heat intolerance  Heme:  No petechiae, ecchymosis, easy bruisability  Skin:  No rash, edema      PHYSICAL EXAM:     GENERAL: NAD  HEENT:  NC/AT  CHEST:  Full & symmetric excursion, no increased effort  ABDOMEN:  Soft, non-tender, non-distended, +BS  EXTREMITIES: ++BLE edema  SKIN:  No rash  NEURO:  Alert      Vital Signs:  Vital Signs Last 24 Hrs  T(C): 36.7 (27 May 2020 06:55), Max: 36.9 (26 May 2020 22:25)  T(F): 98.1 (27 May 2020 06:55), Max: 98.5 (26 May 2020 22:25)  HR: 67 (27 May 2020 06:55) (60 - 88)  BP: 112/60 (27 May 2020 06:55) (100/60 - 144/64)  BP(mean): --  RR: 17 (27 May 2020 06:55) (14 - 18)  SpO2: 97% (27 May 2020 06:55) (93% - 99%)  Daily Height in cm: 144.78 (26 May 2020 08:49)    Daily     LABS:                        7.3    8.00  )-----------( 163      ( 27 May 2020 06:04 )             24.0     05-26    140  |  98  |  12  ----------------------------<  80  3.4<L>   |  32<H>  |  0.79    Ca    7.7<L>      26 May 2020 06:43  Phos  3.0     05-26  Mg     1.9     05-26    TPro  6.0  /  Alb  2.2<L>  /  TBili  2.4<H>  /  DBili  x   /  AST  42<H>  /  ALT  40<H>  /  AlkPhos  135<H>  05-26    LIVER FUNCTIONS - ( 26 May 2020 06:43 )  Alb: 2.2 g/dL / Pro: 6.0 g/dL / ALK PHOS: 135 u/L / ALT: 40 u/L / AST: 42 u/L / GGT: x               Imaging:  < from: Upper EUS (05.26.20 @ 09:44) >    [facility]  _______________________________________________________________________________  Patient Name: John Vogt              Procedure Date: 5/26/2020 9:44 AM  MRN: [id number]                     Account Number: [id number]  YOB: 1970              Admit Type: Inpatient  Room: ENDO 01                         Gender: Female  Attending MD: GERI DENIS MD      _______________________________________________________________________________     Procedure:           Upper EUS  Indications:         Abnormal abdominal/pelvic CT scan and MRI; concern for                        RP mass involving celiac axis  Providers:           GERI DENIS MD, BENTLEY KEY (Fellow)  Medicines:           Monitored Anesthesia Care  Complications:       No immediate complications.  Procedure:           Pre-Anesthesia Assessment:                       - Prior to the procedure, a History and Physical was                        performed, and patient medications andallergies were                        reviewed. The patient is competent. The risks and                        benefits of the procedure and the sedation options and                        risks were discussed with the patient. All questions                 were answered and informed consent was obtained. Patient                        identification and proposed procedure were verified by                        the physician, the nurse and the anesthetist in the                        endoscopy suite. Prophylactic Antibiotics: The patient                        does not require prophylactic antibiotics. Prior                        Anticoagulants: The patient has taken no previous                        anticoagulant or antiplatelet agents. ASA Grade                        Assessment: III - A patient with severe systemic                        disease. After reviewing the risks and benefits, the                        patient was deemed in satisfactory condition to undergo                  the procedure. The anesthesia plan was to use monitored                        anesthesia care (MAC). Immediately prior to                        administration of medications, the patient was                        re-assessed for adequacy to receive sedatives. The heart                        rate, respiratory rate, oxygen saturations, blood                        pressure, adequacy of pulmonary ventilation, and                        response to care were monitored throughout the                        procedure. The physical status of the patient was                        re-assessed after the procedure.                       After obtaining informed consent, the endoscope was                        passed under direct vision. Throughout the procedure,                        the patient's blood pressure, pulse, and oxygen                        saturations were monitored continuously. The was                        introduced through the mouth, and advanced to the                     jejunum. The upper EUS was accomplished without                        difficulty. The patient tolerated the procedure well.                                                                                   Findings:       EGD: :     The examined esophagus was normal.       The stomach mucosa was normal.       There was evidence of previous gastrojejunostomy (Whipple anatomy).       There was a nodule at the anastamosis. Biopsied for histology.       The examined jejunum appeared normal.       EUS:       The exam was performed with a linear echoendoscope.       The celiac artery, splenic vessels and SMA were carefully examined.        There was no visualized soft tissue mass in this region. There were no        celiac lymph nodes.       The pancreatic body and tail appeared normal with normal PD.       There was large ascites.                                                                                   Impression:          - Post-Whipple anatomy.      - Nodule at anastamosis. Biopsied.                       - EUS performed without any ismael-celiac/SMA soft tissue                        masses seen .  Recommendation:      - [facility].                       - Followup nodule biopsy.                       - Defer to primary team for further workup.                                                                                   Attending Participation:       I was present and participated during the entire procedure, including        non-key portions.                                                                                     ___________________  GERI DENIS MD  5/26/2020 10:46:47 AM  This report has been signed electronically.  Number of Addenda: 0    Note Initiated On: 5/26/2020 9:44 AM    < end of copied text >    < from: MR Abdomen w/ IV Cont (05.20.20 @ 11:17) >    EXAM:  MR ABDOMEN IC        PROCEDURE DATE:  May 20 2020         INTERPRETATION:  CLINICAL INFORMATION: pancreatic mass s/p whipple, liver lesion, perivascular soft tissue mass, assess for metastasis    COMPARISON: CT 5/17/2020    PROCEDURE:   MRI of the abdomen was performed with and without intravenous contrast.  IV Contrast: Gadavist. 8 cc administered, 2 cc discarded.  MRCP was performed.    FINDINGS:    LOWER CHEST: Pleural effusions.    LIVER: Severe steatosis. 2 small hemangiomas in the right lobe the largest 0.8 cm.  BILE DUCTS: Normal caliber.  GALLBLADDER: Cholecystectomy.  SPLEEN: Within normal limits.  PANCREAS: Post Whipple's resection of the pancreatic head. No ductal dilatation.  ADRENALS: Within normal limits.  KIDNEYS/URETERS: Mild bilateral hydronephrosis.    VISUALIZED PORTIONS:    BOWEL: Diffusely thickened bowel.   PERITONEUM: Large volume ascites.  VESSELS: Markedly limited evaluation due to ascites and motion artifact. Soft tissue encasing celiac axis and SMA may be enhancing but is not diffusion restricting.  RETROPERITONEUM/LYMPH NODES: No lymphadenopathy.    ABDOMINAL WALL: Anasarca  BONES: Within normal limits.    IMPRESSION:     2 small hepatic lesions consistent with hemangioma. No hepatic metastasis. Severe hepatic steatosis. Persistent large volume ascites.    Limited evaluation of the vascular structures. Perivascular soft tissue encasing celiac axis/SMA better appreciated on CT.    Mild bilateral hydronephrosis? Urinary retention.        KASHIF CHRISOK M.D., ATTENDING RADIOLOGIST  This document has been electronically signed. May 20 2020 12:02PM      < from: CT Chest w/ IV Cont (05.17.20 @ 18:45) >    EXAM:  CT ABDOMEN AND PELVIS IC      EXAM:  CT CHEST IC        PROCEDURE DATE:  May 17 2020         INTERPRETATION:  CLINICAL INFORMATION: Ascites. Evaluate for malignancy. Anemia.    COMPARISON: CT scan 5/16/2020    PROCEDURE:   CT of the Chest, Abdomen and Pelvis was performed with intravenous contrast.   Intravenous contrast: 90 ml Omnipaque 350. 10 ml discarded.  Oral contrast: None.  Sagittal and coronal reformats were performed.    FINDINGS:    CHEST:     LUNGS AND LARGE AIRWAYS: Patent central airways. Bilateral patchy airspace opacity. Passive atelectasis of both lung bases.  PLEURA: Moderate right and small left pleural effusion.  VESSELS: Within normal limits.  HEART: Heart size is normal. No pericardial effusion.  MEDIASTINUM ANDHILA: No lymphadenopathy.  CHEST WALL AND LOWER NECK: Tiny left thyroid nodule measuring 5 mm.    ABDOMEN AND PELVIS:    LIVER: Indeterminate hyperenhancing lesions in the right hepatic lobe measuring 1.2 cm and 0.5 cm, respectively.  BILE DUCTS: Normal caliber.  GALLBLADDER: Within normal limits.  SPLEEN: Within normal limits.  PANCREAS: Status post Whipple. The native pancreas is atrophic, without ductal dilatation. Region of the pancreatic jejunal and hepaticojejunal anastomoses are unremarkable.  ADRENALS: Within normal limits.  KIDNEYS/URETERS: Bilateral hydronephrosis versus parapelvic cysts.     BLADDER: Within normal limits.  REPRODUCTIVE ORGANS: Uterus and bilateral adnexa are within normal limits.    BOWEL: Postoperative changes. Nobowel obstruction. Appendix is not visualized  PERITONEUM: Large amount of abdominal pelvic ascites.  VESSELS: Within normal limits.  RETROPERITONEUM/LYMPH NODES: Confluent retroperitoneal soft tissue density surrounding the celiac axis and SMA.    ABDOMINAL WALL: Anasarca.  BONES: Within normal limits.    IMPRESSION:     CT findings of pulmonary infection/inflammation compatible with many entities (C19V-2).    Confluent retroperitoneal soft tissue density surrounding the celiac axis and SMA, concerning for malignancy.    Indeterminate hyperenhancing lesions in the right hepatic lobe largest measuring 1.2 cm.    Pleural effusions, anasarca, and large abdominal pelvic ascites.      ANDREA QUINTANA M.D., ATTENDING RADIOLOGIST  This document has been electronically signed. May 18 2020  8:45AM          < end of copied text >  < end of copied text >

## 2020-05-27 NOTE — PROGRESS NOTE ADULT - PROBLEM SELECTOR PLAN 3
Resolved  afebrile, no signs of infection; prior UA/Blood cx neg, CT showing b/l patchy airspace opacities w/ no respiratory sx. Likely 2/2 to hemoconcentration 2/2 to diuretics  -cont to monitor off abx  -if spikes fever, reculture, start empiric abx Evaluated by wound care  - following wound care recs  - nutrition c/s  - outpt f/u Evaluated by wound care  - following wound care recs  - nutrition recs appreciated   - outpt f/u

## 2020-05-27 NOTE — CHART NOTE - NSCHARTNOTEFT_GEN_A_CORE
GI EUS/EGD report noted, no soft tissue mass seen. No surgical oncology intervention is indicated at this time    Surgical Onc team will now sign off, please page back with any questions    DULCE MARIA Lemons PGY-2  D Surgery  [phone]

## 2020-05-27 NOTE — PROGRESS NOTE ADULT - PROBLEM SELECTOR PLAN 5
Pt presented w/ anemia to 3.8 and chronic fatigue. Microcytic. Now improving s/p 4u PRBCs. Suspect iron deficiency vs anemia of chronic illness. Low suspicion for GI Bleed. Concerning for malignancy given she had pancreatic mass (unclear hx). Low suspicion for hemolysis.  - iron lvl decreased; supplemented  - Hgb stable   - monitor HgB and transfuse if <7  - Consider hematology evaluation for BM biopsy if nodule biopsy inconclusive Likely in setting of hepatic congestion, ascites demonstrated on CT but no evidence of cirrhosis. Improving. Elevated indirect bili.  - Hep panel neg  - monitor

## 2020-05-28 ENCOUNTER — TRANSCRIPTION ENCOUNTER (OUTPATIENT)
Age: 50
End: 2020-05-28

## 2020-05-28 VITALS
RESPIRATION RATE: 20 BRPM | HEART RATE: 88 BPM | OXYGEN SATURATION: 98 % | TEMPERATURE: 99 F | SYSTOLIC BLOOD PRESSURE: 113 MMHG | DIASTOLIC BLOOD PRESSURE: 78 MMHG

## 2020-05-28 LAB
A1AT SERPL-MCNC: 148 MG/DL — SIGNIFICANT CHANGE UP (ref 90–200)
ALBUMIN SERPL ELPH-MCNC: 2 G/DL — LOW (ref 3.3–5)
ALP SERPL-CCNC: 141 U/L — HIGH (ref 40–120)
ALT FLD-CCNC: 34 U/L — HIGH (ref 4–33)
ANION GAP SERPL CALC-SCNC: 7 MMO/L — SIGNIFICANT CHANGE UP (ref 7–14)
AST SERPL-CCNC: 40 U/L — HIGH (ref 4–32)
BILIRUB SERPL-MCNC: 2.3 MG/DL — HIGH (ref 0.2–1.2)
BUN SERPL-MCNC: 11 MG/DL — SIGNIFICANT CHANGE UP (ref 7–23)
CALCIUM SERPL-MCNC: 7.5 MG/DL — LOW (ref 8.4–10.5)
CHLORIDE SERPL-SCNC: 102 MMOL/L — SIGNIFICANT CHANGE UP (ref 98–107)
CO2 SERPL-SCNC: 29 MMOL/L — SIGNIFICANT CHANGE UP (ref 22–31)
CREAT SERPL-MCNC: 0.74 MG/DL — SIGNIFICANT CHANGE UP (ref 0.5–1.3)
GLUCOSE SERPL-MCNC: 68 MG/DL — LOW (ref 70–99)
HCT VFR BLD CALC: 26.8 % — LOW (ref 34.5–45)
HGB BLD-MCNC: 7.9 G/DL — LOW (ref 11.5–15.5)
MAGNESIUM SERPL-MCNC: 1.9 MG/DL — SIGNIFICANT CHANGE UP (ref 1.6–2.6)
MCHC RBC-ENTMCNC: 26.5 PG — LOW (ref 27–34)
MCHC RBC-ENTMCNC: 29.5 % — LOW (ref 32–36)
MCV RBC AUTO: 89.9 FL — SIGNIFICANT CHANGE UP (ref 80–100)
NRBC # FLD: 0 K/UL — SIGNIFICANT CHANGE UP (ref 0–0)
PHOSPHATE SERPL-MCNC: 2.6 MG/DL — SIGNIFICANT CHANGE UP (ref 2.5–4.5)
PLATELET # BLD AUTO: 182 K/UL — SIGNIFICANT CHANGE UP (ref 150–400)
PMV BLD: 10.9 FL — SIGNIFICANT CHANGE UP (ref 7–13)
POTASSIUM SERPL-MCNC: 3.9 MMOL/L — SIGNIFICANT CHANGE UP (ref 3.5–5.3)
POTASSIUM SERPL-SCNC: 3.9 MMOL/L — SIGNIFICANT CHANGE UP (ref 3.5–5.3)
PROT SERPL-MCNC: 5.3 G/DL — LOW (ref 6–8.3)
RBC # BLD: 2.98 M/UL — LOW (ref 3.8–5.2)
RBC # FLD: SIGNIFICANT CHANGE UP % (ref 10.3–14.5)
RETICS #: 57 K/UL — SIGNIFICANT CHANGE UP (ref 25–125)
RETICS/RBC NFR: 1.9 % — SIGNIFICANT CHANGE UP (ref 0.5–2.5)
SODIUM SERPL-SCNC: 138 MMOL/L — SIGNIFICANT CHANGE UP (ref 135–145)
WBC # BLD: 11.16 K/UL — HIGH (ref 3.8–10.5)
WBC # FLD AUTO: 11.16 K/UL — HIGH (ref 3.8–10.5)

## 2020-05-28 PROCEDURE — 99232 SBSQ HOSP IP/OBS MODERATE 35: CPT | Mod: GC

## 2020-05-28 PROCEDURE — 99239 HOSP IP/OBS DSCHRG MGMT >30: CPT | Mod: GC

## 2020-05-28 RX ORDER — FUROSEMIDE 40 MG
1 TABLET ORAL
Qty: 60 | Refills: 0
Start: 2020-05-28 | End: 2020-06-26

## 2020-05-28 RX ORDER — FERROUS SULFATE 325(65) MG
1 TABLET ORAL
Qty: 90 | Refills: 0
Start: 2020-05-28 | End: 2020-06-26

## 2020-05-28 RX ORDER — FUROSEMIDE 40 MG
1 TABLET ORAL
Qty: 0 | Refills: 0 | DISCHARGE

## 2020-05-28 RX ORDER — LIPASE/PROTEASE/AMYLASE 16-48-48K
1 CAPSULE,DELAYED RELEASE (ENTERIC COATED) ORAL
Qty: 90 | Refills: 0
Start: 2020-05-28 | End: 2020-06-26

## 2020-05-28 RX ADMIN — Medication 325 MILLIGRAM(S): at 12:59

## 2020-05-28 RX ADMIN — Medication 1 CAPSULE(S): at 09:17

## 2020-05-28 RX ADMIN — Medication 1 CAPSULE(S): at 12:59

## 2020-05-28 NOTE — DISCHARGE NOTE NURSING/CASE MANAGEMENT/SOCIAL WORK - NSSCNAMETXT_GEN_ALL_CORE
[facility] . Initial visit will be day after discharge home. A nurse will call prior to home visit

## 2020-05-28 NOTE — PROGRESS NOTE ADULT - NSHPATTENDINGPLANDISCUSS_GEN_ALL_CORE
HS, patient
resident
HS, patient

## 2020-05-28 NOTE — CHART NOTE - NSCHARTNOTEFT_GEN_A_CORE
Pathology from anastomotic nodule was negative for malignancy. Case was discussed with primary team. Patient is planned to be discharged today. Given underlying iron deficiency anemia requiring IV iron infusions, will refer patient to [facility] in order to follow-up with Hematology.    Plan d/w primary team    Elijah Hope, PGY4  Hematology-Oncology Fellow  Pager: [phone] / [phone]

## 2020-05-28 NOTE — PROGRESS NOTE ADULT - SUBJECTIVE AND OBJECTIVE BOX
PROGRESS NOTE:     Patient is a 49y old  Female who presents with a chief complaint of Anemia (27 May 2020 20:34)      SUBJECTIVE / OVERNIGHT EVENTS:        MEDICATIONS  (STANDING):  ferrous    sulfate 325 milliGRAM(s) Oral daily  lidocaine 1% Injectable 20 milliLiter(s) Local Injection once  pancrelipase  (CREON 24,000 Lipase Units) 1 Capsule(s) Oral three times a day with meals    MEDICATIONS  (PRN):  ALBUTerol    90 MICROgram(s) HFA Inhaler 2 Puff(s) Inhalation every 6 hours PRN Shortness of Breath and/or Wheezing  benzocaine 15 mG/menthol 3.6 mG (Sugar-Free) Lozenge 1 Lozenge Oral every 6 hours PRN Sore Throat  sodium chloride 0.65% Nasal 1 Spray(s) Both Nostrils daily PRN dry nose      CAPILLARY BLOOD GLUCOSE        I&O's Summary    26 May 2020 07:01  -  27 May 2020 07:00  --------------------------------------------------------  IN: 360 mL / OUT: 1000 mL / NET: -640 mL    27 May 2020 07:01  -  28 May 2020 06:41  --------------------------------------------------------  IN: 640 mL / OUT: 1000 mL / NET: -360 mL        PHYSICAL EXAM:  Vital Signs Last 24 Hrs  T(C): 36.9 (27 May 2020 22:44), Max: 36.9 (27 May 2020 22:44)  T(F): 98.5 (27 May 2020 22:44), Max: 98.5 (27 May 2020 22:44)  HR: 87 (27 May 2020 22:44) (67 - 87)  BP: 125/55 (27 May 2020 22:44) (112/60 - 125/55)  BP(mean): --  RR: 17 (27 May 2020 22:44) (17 - 18)  SpO2: 99% (27 May 2020 22:44) (97% - 99%)    CONSTITUTIONAL: NAD, well-developed  RESPIRATORY: Normal respiratory effort; lungs are clear to auscultation bilaterally  CARDIOVASCULAR: Regular rate and rhythm, normal S1 and S2, no murmur/rub/gallop; No lower extremity edema; Peripheral pulses are 2+ bilaterally  ABDOMEN: Nontender to palpation, normoactive bowel sounds, no rebound/guarding; No hepatosplenomegaly  MUSCLOSKELETAL: no clubbing or cyanosis of digits; no joint swelling or tenderness to palpation  NEURO: CN 2-12 grossly intact, moves all limbs spontaneously  PSYCH: A+O to person, place, and time; affect appropriate    LABS:                        7.3    8.00  )-----------( 163      ( 27 May 2020 06:04 )             24.0     05-27    140  |  102  |  12  ----------------------------<  77  4.0   |  33<H>  |  0.73    Ca    7.3<L>      27 May 2020 06:04  Phos  2.6     05-27  Mg     1.8     05-27    TPro  5.2<L>  /  Alb  1.8<L>  /  TBili  2.1<H>  /  DBili  x   /  AST  36<H>  /  ALT  31  /  AlkPhos  124<H>  05-27 PROGRESS NOTE:     Patient is a 49y old  Female who presents with a chief complaint of Anemia (27 May 2020 20:34)      SUBJECTIVE / OVERNIGHT EVENTS:   - pt w/ complaints of L arm pain s/p infiltrated IV; gradually improving   - denies abd pain, cp, sob, n/v  - naeo         MEDICATIONS  (STANDING):  ferrous    sulfate 325 milliGRAM(s) Oral daily  lidocaine 1% Injectable 20 milliLiter(s) Local Injection once  pancrelipase  (CREON 24,000 Lipase Units) 1 Capsule(s) Oral three times a day with meals    MEDICATIONS  (PRN):  ALBUTerol    90 MICROgram(s) HFA Inhaler 2 Puff(s) Inhalation every 6 hours PRN Shortness of Breath and/or Wheezing  benzocaine 15 mG/menthol 3.6 mG (Sugar-Free) Lozenge 1 Lozenge Oral every 6 hours PRN Sore Throat  sodium chloride 0.65% Nasal 1 Spray(s) Both Nostrils daily PRN dry nose      CAPILLARY BLOOD GLUCOSE        I&O's Summary    26 May 2020 07:01  -  27 May 2020 07:00  --------------------------------------------------------  IN: 360 mL / OUT: 1000 mL / NET: -640 mL    27 May 2020 07:01  -  28 May 2020 06:41  --------------------------------------------------------  IN: 640 mL / OUT: 1000 mL / NET: -360 mL        PHYSICAL EXAM:  Vital Signs Last 24 Hrs  T(C): 36.9 (27 May 2020 22:44), Max: 36.9 (27 May 2020 22:44)  T(F): 98.5 (27 May 2020 22:44), Max: 98.5 (27 May 2020 22:44)  HR: 87 (27 May 2020 22:44) (67 - 87)  BP: 125/55 (27 May 2020 22:44) (112/60 - 125/55)  BP(mean): --  RR: 17 (27 May 2020 22:44) (17 - 18)  SpO2: 99% (27 May 2020 22:44) (97% - 99%)    General: well appearing female, sitting up in bed  HEENT: neck supple  Cardiovascular: normal s1 s2 no grm  Respiratory: cta b/l   Abdominal: soft, distended non tense, no ttp  Extremities: 4+ pitting edema   Neurologic: non focal  Psych: awake alert answering questions appropriate    LABS:                        7.3    8.00  )-----------( 163      ( 27 May 2020 06:04 )             24.0     05-27    140  |  102  |  12  ----------------------------<  77  4.0   |  33<H>  |  0.73    Ca    7.3<L>      27 May 2020 06:04  Phos  2.6     05-27  Mg     1.8     05-27    TPro  5.2<L>  /  Alb  1.8<L>  /  TBili  2.1<H>  /  DBili  x   /  AST  36<H>  /  ALT  31  /  AlkPhos  124<H>  05-27

## 2020-05-28 NOTE — PROGRESS NOTE ADULT - PROBLEM SELECTOR PLAN 4
Pt presented w/ anemia to 3.8 and chronic fatigue. Microcytic. Now improving s/p 4u PRBCs. Suspect iron deficiency vs anemia of chronic illness. Low suspicion for GI Bleed. Concerning for malignancy given she had pancreatic mass (unclear hx). Low suspicion for hemolysis.  - iron lvl decreased; supplemented  - Hgb stable   - monitor HgB and transfuse if <7  - Consider hematology evaluation for BM biopsy if nodule biopsy inconclusive

## 2020-05-28 NOTE — DISCHARGE NOTE PROVIDER - CARE PROVIDER_API CALL
Leidy York  [address] [place] [address]  Phone: [phone]  Fax: (   )    -  Follow Up Time: Leidy York  [address] [place] [address]  Phone: [phone]  Fax: (   )    -  Scheduled Appointment: 06/01/2020

## 2020-05-28 NOTE — DISCHARGE NOTE PROVIDER - NSDCFUADDINST_GEN_ALL_CORE_FT
Please follow up with Dr. York on Monday 6/1 the office will call to verify an appointment.     Please continue taking your water pill Furosemide (Lasix) 40mg twice per day and you will need repeat lab work when you see Dr. York.    Please follow up with Hematology regarding your low iron levels and Gastroenterology regarding the concern for your malabsorption

## 2020-05-28 NOTE — PROGRESS NOTE ADULT - PROBLEM SELECTOR PLAN 2
Anasarca (significant LLE and abdominal swelling) in the setting of decreased oncotic pressure/ hypoalbuminemia and mild hypoproteinemia  - hold off Lasix 2/2 contraction alkalosis   - s/p therapeutic para; cytopath shows no malignant cells  - Duplex w/ no DVT

## 2020-05-28 NOTE — DISCHARGE NOTE NURSING/CASE MANAGEMENT/SOCIAL WORK - PATIENT PORTAL LINK FT
You can access the FollowMyHealth Patient Portal offered by [facility] by registering at the following website: [email or web address]. By joining [facility]’s FollowMyHealth portal, you will also be able to view your health information using other applications (apps) compatible with our system.

## 2020-05-28 NOTE — PROGRESS NOTE ADULT - ASSESSMENT
Impression:     1. Possible retroperitoneal mass seen on CT, questionable on MR, and not seen on EUS  2. 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 SAAG is elevated but overall picture seems more consistent with low oncotic state.  3. Abnormal liver tests  4. Iron deficiency anemia, thrombocytopenia. Suggestive of malabsorption.  5. History of Whipple for large panc psuedopapillary tumor x 11 years ago    Recommendation:  - reviewed path from gastrojejunostomy nodule - negative for acute pathology (just inflammation)  - appreciate heme/onc consultation and recs  - followup workup for malabsorptive processes: stool alpha-1-antitrypsin as well as serum level (check for A1AT deficiency) stool GI PCR (check for tropical sprue, Whipple's), stool fecal elastase level (check for pancreatic insufficiency), ascitic fluid adenosine deaminase (to check for TB in ascites), TTG/IgA to check for Celiacs  - fluid management per primary team

## 2020-05-28 NOTE — PROGRESS NOTE ADULT - ATTENDING COMMENTS
- I have seen and examined the patient on rounds. Patient's chart, labs, images and reports reviewed.  Abd distended , soft non tender  Cytology from paracentesis- no malignant cells  Elevated CEA and CA 19-9 - unknown etiology  Will follow hepatology and GI recommendations  -Thank you for allowing me to take care of your patient. I will follow the patient with you.  - I have discussed the diagnosis and therapeutic plan with the patient in detail. Patient expressed verbal understanding and willingness to proceed with proposed plan. All questions answered.  Regards  Kieran Rivas MD, FACS, FICS  - [employer] at [facility]  Division of Surgical Oncology, Department of Surgery  [address] [address]    [address] [facility] [address]    [address] [place] [id number]  Ph: [phone]  Fax: [phone]
Patient seen and examined with the GI fellow. I agree with the above assessment and plan. Thank you for allowing us to care for your patient.    Plan as per above
Patient seen and examined with the GI fellow. I agree with the above assessment and plan. Thank you for allowing us to care for your patient.    Pls consult anesthesia for procedure next week to see if she needs further optimization.
Patient was seen and examined personally by me. I have discussed the plan and reviewed the resident's note and agree with the above physical exam findings including assessment and plan except as indicated below.    Diuresing well, patient feels better with lasix.    Continue diuresis with lasix 40 IVP BID.  Possible EUS with biopsy next week per GI  Peritoneal fluid cytopath negative for malignant cells. AFB in peritoneal fluid negative so far. Serum Quant TB indeterminate.  Will also need to consider possible BM biopsy given anemia and thrombocytopenia if EUS unsuccessful to establish a diagnosis  PT recs: home PT  Awaiting outpt records from Dr. Vladislav Davila  Will need to ensure appropriate followup on DC. PCP: Dr. Leidy York.
Patient was seen and examined personally by me. I have discussed the plan and reviewed the resident's note and agree with the above physical exam findings including assessment and plan except as indicated below.    Continue with lasix 40 IVP BID. TTE with preserved LVSF but with moderate MR  Peritoneal fluid cytopath negative for malignant cells. AFB in peritoneal fluid negative so far. Serum Quant TB indeterminate.  Possible EUS with biopsy next week  Will also need to consider possible BM biopsy given anemia and thrombocytopenia if EUS unsuccessful to establish a diagnosis  Obtain collateral records from Dr. Vladislav Davila  PT recs: home PT  Will need to ensure appropriate followup on DC. PCP: Dr. Leidy York.
Patient was seen and examined personally by me. I have discussed the plan and reviewed the resident's note and agree with the above physical exam findings including assessment and plan except as indicated below.    Continue diuresis with lasix 40 IVP BID.  Possible EUS with biopsy next week per GI to help establish a diagnosis  Peritoneal fluid cytopath negative for malignant cells. AFB in peritoneal fluid negative so far. Serum Quant TB indeterminate.  Thrombocytopenia self resolved. H/H remains stable  PT recs: home PT  Awaiting outpt records from Dr. Vladislav Davila  Will need to ensure appropriate followup on DC. PCP: Dr. Leidy York.
Patient was seen and examined personally by me. I have discussed the plan and reviewed the resident's note and agree with the above physical exam findings including assessment and plan except as indicated below.    #Pancreatic pseudopapillary tumor: s/p Whipple in .  Records from [facility] pathology showing pseudopapillary tumor with a potential to become malignant.  Pt here is s/p pancreatic nodule biopsy (from anastomosis site) negative for malignancy.     #Anasarca: SAA.3 with transudative ascites, significant improvement in edema s/p IV lasix. Can start PO lasix on DC and advised repeat bloodwork within 1 week    #Microcytic anemia: H/H stable s/p multiple PRBC transfusions and venofer. Outpt heme followup    Stable for DC home. DC time: 33 min. Has followup with PCP, Dr. York to whom team had given signout
patient seen and examine at bed side   agree with above assessment and plan    monitor labs  EUS as per GI
Patient was seen and examined personally by me. I have discussed the plan and reviewed the resident's note and agree with the above physical exam findings including assessment and plan except as indicated below.    Continue with lasix as tolerated  Peritoneal fluid cytopath negative for malignant cells. Check Quant TB in serum and peritoneal fluid  MRI Abd with small hepatic lesions consistent with hemangioma and severe hepatic steatosis.   IR to evaluate for possible biopsy of RP soft tissue density.   and CEA elevated. CA 19-9 <2. Patient reports family hx of ovarian ca in sister.  PT recs: home PT  Will need to ensure appropriate followup. PCP: Dr. Leidy York.
Patient was seen and examined personally by me. I have discussed the plan and reviewed the resident's note and agree with the above physical exam findings including assessment and plan except as indicated below.    Reports feeling better. No pain or SOB at rest or with exertion    Has significant anasarca from legs up to abdomen with pitting edema likely from hypoalbuminemia. Will give Lasix 40 IVP x1 trial but less likely to be intravascular depletion. LE US neg for DVT  F/u peritoneal fluid cytopathology-expedite read if possible  CT C/A/P with hepatic lesions, large abdominopelvic ascites and confluent RP soft tissue density surrounding celiac axis and SMA highly suspicious for malignancy  Check , CEA, CA 19-9. Patient reports family hx of ovarian ca in sister.  PT recs: home PT  Will need to ensure appropriate followup with PCP and likely oncology once a diagnosis is established
Patient was seen and examined personally by me. I have discussed the plan and reviewed the resident's note and agree with the above physical exam findings including assessment and plan except as indicated below.    Continue with lasix 40 IVP daily. Check TTE  Peritoneal fluid cytopath negative for malignant cells. AFB in peritoneal fluid negative so far. F/u Serum Quant TB  MRI Abd with small hepatic lesions consistent with hemangioma and severe hepatic steatosis.   RP soft tissue density not amenable to IR biopsy. GI consult for possible EUS  Will also need to consider possible BM biopsy given anemia and thrombocytopenia  Obtain collateral records from Dr. Vladislav Davila   and CEA elevated. CA 19-9 <2. Family hx of ovarian ca in sister.  PT recs: home PT  Will need to ensure appropriate followup. PCP: Dr. Leidy York.
Patient was seen and examined personally by me. I have discussed the plan and reviewed the resident's note and agree with the above physical exam findings including assessment and plan except as indicated below.    States she feels lower extremity swelling is improved after Lasix yesterday. She reports she had increased urine output however output not documented.    Paracentesis done 5/19 and ~10L removed. Will give albumin replacement 60-80g and lasix 40 IVP afterwards depending on BP  Peritoneal fluid cytopath negative for malignant cells. Will obtain MRI Abd liver protocol to evaluate liver lesions for possible biopsy   and CEA elevated. CA 19-9 <2. Patient reports family hx of ovarian ca in sister.  PT recs: home PT  Will need to ensure appropriate followup with PCP and likely oncology once a diagnosis is established
Pt seen and examined, chart and labs reviewed.  Agree with Dr. Rivas's note as above with modifications.    Pt remains profoundly edematous, however as per team, swelling has gone down significantly with IV diuresis, which is now on hold 2/2 contraction alkalosis.  Pt went for EUS today, however no RP mass was identified during procedure and only small pancreatic nodule was biopsied.    Still unclear what the etiology of her ascites/anasarca is.  She has elevated tumor markers and given history of "pancreatic (benign) mass" s/p Whipple, suspicion of malignancy remains high.  She is severely hypoalbuminemic, likely from malnutrition post Whipple, which is also contributing to her anasarca - will start Creon supplementation. F/U stool elastase/fat assays.    Will review images with radiology to ascertain if surgical tissue biopsy if feasible, and potentially discuss BM biopsy with hematology team.
Pt seen and examined, chart and labs reviewed.  Pt reports significant improvement in edema and is able to ambulate without difficulty.  She is anxious to go home tomorrow and is in agreement with more thorough follow up with outpt providers.  She denies diarrhea, change in bowel habits, abdominal distention has improved tremendously.      #Pancreatic pseudopapillary tumor: s/p Whipple in 2011.  Records from [facility] obtained today and pathology showing pseudopapillary tumor, which does have the potential to become malignant.  Pt s/p pancreatic nodule biopsy (from anastomosis site); advanced GI team did not locate RP mass for biopsy.  Team reviewed CT scan with radiology and RP mass is now thought to be more confluent lymph nodes and not so much a large mass. Will have oncology evaluate on how to proceed with further investigation now that we have obtained records.  GI to also comment on pathology findings.     #Anasarca: unclear what this is exactly from but differential includes malignancy related (though ascites with no malignant cells) vs. severe malnutrition with hypoalbuminemic state.  Pt was not taking Creon as advised post Whipple but denies any major changes in her bowel habits.  Will continue Creon for now, f/u stool elastase and stool fat levels.  Appreciate nutrition evaluation.  Agree with holding Lasix at this time for contraction alkalosis.    #Microcytic anemia: with no overt blood loss, inappropriate reticulocyte response.  Fe studies suggestive of borderline iron deficiency from ?malabsorption syndrome, now on PO Fe supplementation.  Received 4U total pRBCs this admission with stable H/H.  Would give Fe infusion today as pt may not be absorbing PO iron effectively.
Magdy improved.  CBC responded appropriately- s/p 4 PRBC.  Plan for CT c/a/p with IV contrast after premedication.  highly suspicious for malignancy.
s/p 3 units prbc; will give another unit prbc now and recheck cbc post transfusion.  SIRS cascade on admission likely due to profound anemia - would monitor off antibiotics at this time.  Check ct c/a/p with iv contrast when nestor improves.  will likely need to discuss with IR regarding biopsy.

## 2020-05-28 NOTE — PROGRESS NOTE ADULT - SUBJECTIVE AND OBJECTIVE BOX
Chief Complaint:  Patient is a 49y old  Female who presents with a chief complaint of Anemia (28 May 2020 06:41)      Interval Events: No adverse events overnight.  Pt feels she is getting stronger. Path from gj bx neg        Allergies:  IV Contrast (Unknown)  Seafood (Anaphylaxis)      Hospital Medications:  ALBUTerol    90 MICROgram(s) HFA Inhaler 2 Puff(s) Inhalation every 6 hours PRN  benzocaine 15 mG/menthol 3.6 mG (Sugar-Free) Lozenge 1 Lozenge Oral every 6 hours PRN  ferrous    sulfate 325 milliGRAM(s) Oral daily  lidocaine 1% Injectable 20 milliLiter(s) Local Injection once  pancrelipase  (CREON 24,000 Lipase Units) 1 Capsule(s) Oral three times a day with meals  sodium chloride 0.65% Nasal 1 Spray(s) Both Nostrils daily PRN      PMHX/PSHX:  Anemia  Asthma  No pertinent past medical history  History of pancreatic surgery      Family history:  FH: ovarian cancer  FH: hypertension  FH: type 2 diabetes      ROS:     General:  No wt loss, fevers, chills, night sweats, fatigue,   Eyes:  Good vision, no reported pain  ENT:  No sore throat, pain, runny nose, dysphagia  CV:  No pain, palpitations, hypo/hypertension  Resp:  No dyspnea, cough, tachypnea, wheezing  GI:  See HPI  :  No pain, bleeding, incontinence, nocturia  Muscle:  No pain, weakness  Neuro:  No weakness, tingling, memory problems  Psych:  No fatigue, insomnia, mood problems, depression  Endocrine:  No polyuria, polydipsia, cold/heat intolerance  Heme:  No petechiae, ecchymosis, easy bruisability  Skin:  No rash, edema      PHYSICAL EXAM:     GENERAL: NAD  HEENT:  NC/AT  CHEST:  Full & symmetric excursion, no increased effort  ABDOMEN:  Soft, non-tender, non-distended, +BS  EXTREMITIES:  +BLE edema  SKIN:  No rash  NEURO:  Alert      Vital Signs:  Vital Signs Last 24 Hrs  T(C): 37.1 (28 May 2020 06:30), Max: 37.1 (28 May 2020 06:30)  T(F): 98.8 (28 May 2020 06:30), Max: 98.8 (28 May 2020 06:30)  HR: 76 (28 May 2020 06:30) (70 - 87)  BP: 121/61 (28 May 2020 06:30) (115/58 - 125/55)  BP(mean): --  RR: 17 (28 May 2020 06:30) (17 - 18)  SpO2: 97% (28 May 2020 06:30) (97% - 99%)  Daily     Daily     LABS:                        7.3    8.00  )-----------( 163      ( 27 May 2020 06:04 )             24.0     05-27    140  |  102  |  12  ----------------------------<  77  4.0   |  33<H>  |  0.73    Ca    7.3<L>      27 May 2020 06:04  Phos  2.6     05-27  Mg     1.8     05-27    TPro  5.2<L>  /  Alb  1.8<L>  /  TBili  2.1<H>  /  DBili  x   /  AST  36<H>  /  ALT  31  /  AlkPhos  124<H>  05-27    LIVER FUNCTIONS - ( 27 May 2020 06:04 )  Alb: 1.8 g/dL / Pro: 5.2 g/dL / ALK PHOS: 124 u/L / ALT: 31 u/L / AST: 36 u/L / GGT: x                   Imaging:  reviewed

## 2020-05-28 NOTE — DISCHARGE NOTE PROVIDER - NSFOLLOWUPCLINICS_GEN_ALL_ED_FT
[facility] Gastroenterology  Gastroenterology  [address] [place] [address] [address]  [place] [address]  Phone: [phone]  Fax:   Follow Up Time: 1 week    [facility]  Hematology/Oncology  [address] [place] [id number]  Phone: [phone]  Fax:   Follow Up Time: 1 week

## 2020-05-28 NOTE — DISCHARGE NOTE PROVIDER - NSDCMRMEDTOKEN_GEN_ALL_CORE_FT
albuterol 90 mcg/inh inhalation aerosol: 2 puff(s) inhaled every 6 hours, As Needed  cetirizine 10 mg oral tablet: 1 tab(s) orally once a day  Klor-Con 10 oral tablet, extended release: 1 tab(s) orally once a day albuterol 90 mcg/inh inhalation aerosol: 2 puff(s) inhaled every 6 hours, As Needed  cetirizine 10 mg oral tablet: 1 tab(s) orally once a day  Creon 24,000 units oral delayed release capsule: 1 cap(s) orally 3 times a day (with meals)  FeroSul 325 mg (65 mg elemental iron) oral tablet: 1 tab(s) orally 3 times a day  Klor-Con 10 oral tablet, extended release: 1 tab(s) orally once a day  Lasix 40 mg oral tablet: 1 tab(s) orally 2 times a day

## 2020-05-28 NOTE — DISCHARGE NOTE PROVIDER - PROVIDER TOKENS
FREE:[LAST:[York],FIRST:[Leidy],PHONE:[phone]],FAX:[(   )    -],ADDRESS:[[facility] [place] [address]]] FREE:[LAST:[York],FIRST:[Leidy],PHONE:[phone]],FAX:[(   )    -],ADDRESS:[[facility] [place] [address]],SCHEDULEDAPPT:[06/01/2020]]

## 2020-05-28 NOTE — PROGRESS NOTE ADULT - REASON FOR ADMISSION
Anemia

## 2020-05-28 NOTE — PROGRESS NOTE ADULT - PROBLEM SELECTOR PLAN 1
CT A/P Indeterminate hyper enhancing lesions in the right hepatic lobe. MR Liver show hepatic lesions c/w hemangioma and Perivascular soft tissue encasing celiac axis/SMA. CA 19-9 unremarkable but CEA and  elevated  - EUS showed no discernible mass; node was biopsied  - images reviewed w/ rads; soft tissue infiltrating mesentery c/w confluent lymph nodes   - f/u vitamin D level, stool fat and elastase; quant TB (indeterminate), acid fast culture from ascitic fluid (NGTD), total IgA elevated, TTG IgG elevated, TTG IgA wnl  - records obtained; 12.5cm pancreatic pseudopapillary tumor resected along w/ colon, stomach, and gall bladder; path and GI will review records CT A/P Indeterminate hyper enhancing lesions in the right hepatic lobe. MR Liver show hepatic lesions c/w hemangioma and Perivascular soft tissue encasing celiac axis/SMA. CA 19-9 unremarkable but CEA and  elevated  - EUS showed no discernible mass; node was biopsied  - images reviewed w/ rads; soft tissue infiltrating mesentery c/w confluent lymph nodes   - f/u vitamin D level, stool fat and elastase; quant TB (indeterminate), acid fast culture from ascitic fluid (NGTD), total IgA elevated, TTG IgG elevated, TTG IgA wnl  - records obtained; 12.5cm pancreatic pseudopapillary tumor resected along w/ colon, stomach, and gall bladder  - GI/heme-onc reviewed path; sx related to protein losing enteropathy 2/2 to malnutrition given major bowel manipulation; will cont to monitor and f/u pend GI labs CT A/P Indeterminate hyper enhancing lesions in the right hepatic lobe. MR Liver show hepatic lesions c/w hemangioma and Perivascular soft tissue encasing celiac axis/SMA. CA 19-9 unremarkable but CEA and  elevated  - EUS showed no discernible mass; node was biopsied  - images reviewed w/ rads; soft tissue infiltrating mesentery c/w confluent lymph nodes   - f/u vitamin D level, stool fat and elastase; quant TB (indeterminate), acid fast culture from ascitic fluid (NGTD), total IgA elevated, TTG IgG elevated, TTG IgA wnl  - records obtained; 12.5cm pancreatic pseudopapillary tumor resected along w/ colon, stomach, and gall bladder  - GI/heme-onc reviewed path; sx related to protein losing enteropathy 2/2 to malnutrition given major bowel manipulation  - will dc w/ appropriate heme/gi/pcp f/u

## 2020-05-28 NOTE — DISCHARGE NOTE PROVIDER - NSDCCPCAREPLAN_GEN_ALL_CORE_FT
PRINCIPAL DISCHARGE DIAGNOSIS  Diagnosis: Retroperitoneal mass  Assessment and Plan of Treatment: Retroperitoneal mass  You have been seen and evaluated for a mass in the abdomen which was seen on MRI and CT which was more consistent w/ enlarged lymph nodes. A biopsy was sent which was negative for cancer cells. This is in the setting of a pancreatic mass that was taken out 10 years ago by Dr. Vladislav Davila of [facility]. Although no confirmatory diagnosis was made dueing this hospital stay, it is imperative that you follow up wih the GI clinic, Hematolgy clinic and your PCP regarding this issue to further work up the etilogy of your symptoms. The contact info for all of these clinics have been put in the discharge paper work.      SECONDARY DISCHARGE DIAGNOSES  Diagnosis: Anemia  Assessment and Plan of Treatment: Anemia  You have been seen and evaluated for anemia requiring blood transfusion. Your hemoglobin has been stable throughout your hospital stay. You have been given iron supplementation to help alleivate your symptoms since you have also been found to be deficient during your stay. A prescription was sent to the pharmacy, please take it as directed and discuss the continuation of the medication with your PCP.    Diagnosis: Anasarca  Assessment and Plan of Treatment: You have been seen and evaluated for lower extremity and abdominal swelling of which the etiology has not been fully elucidated. The GI and hematology team have seen and evaluated and is thought that a contribnuting factor is malnutrition due to lack of pancreatic enzymes. These enzymes have been supplemented and sent to the pharmacy which must be taken as directed. It is imperative that you follow up with the GI clinic, hematology clinic and the PCP regarding this issue.

## 2020-06-01 PROBLEM — J45.909 UNSPECIFIED ASTHMA, UNCOMPLICATED: Chronic | Status: ACTIVE | Noted: 2020-05-15

## 2020-06-01 PROBLEM — D64.9 ANEMIA, UNSPECIFIED: Chronic | Status: ACTIVE | Noted: 2020-05-15

## 2020-06-09 ENCOUNTER — OUTPATIENT (OUTPATIENT)
Dept: OUTPATIENT SERVICES | Facility: HOSPITAL | Age: 50
LOS: 1 days | Discharge: HOME | End: 2020-06-09

## 2020-06-09 DIAGNOSIS — D64.9 ANEMIA, UNSPECIFIED: ICD-10-CM

## 2020-06-09 DIAGNOSIS — Z98.890 OTHER SPECIFIED POSTPROCEDURAL STATES: Chronic | ICD-10-CM

## 2020-06-10 DIAGNOSIS — R97.1 ELEVATED CANCER ANTIGEN 125 [CA 125]: ICD-10-CM

## 2020-06-10 DIAGNOSIS — J45.909 UNSPECIFIED ASTHMA, UNCOMPLICATED: ICD-10-CM

## 2020-06-10 DIAGNOSIS — Z80.41 FAMILY HISTORY OF MALIGNANT NEOPLASM OF OVARY: ICD-10-CM

## 2020-06-10 DIAGNOSIS — D50.9 IRON DEFICIENCY ANEMIA, UNSPECIFIED: ICD-10-CM

## 2020-06-10 PROBLEM — Z00.00 ENCOUNTER FOR PREVENTIVE HEALTH EXAMINATION: Status: ACTIVE | Noted: 2020-06-10

## 2020-06-10 RX ORDER — ALBUTEROL SULFATE 108 UG/1
108 (90 BASE) AEROSOL, METERED RESPIRATORY (INHALATION)
Refills: 0 | Status: ACTIVE | COMMUNITY
Start: 2020-06-10

## 2020-06-15 ENCOUNTER — APPOINTMENT (OUTPATIENT)
Dept: HEMATOLOGY ONCOLOGY | Facility: CLINIC | Age: 50
End: 2020-06-15

## 2020-09-10 ENCOUNTER — EMERGENCY (EMERGENCY)
Facility: HOSPITAL | Age: 50
LOS: 1 days | Discharge: HOME | End: 2020-09-10
Attending: EMERGENCY MEDICINE | Admitting: EMERGENCY MEDICINE
Payer: COMMERCIAL

## 2020-09-10 VITALS
SYSTOLIC BLOOD PRESSURE: 118 MMHG | DIASTOLIC BLOOD PRESSURE: 61 MMHG | OXYGEN SATURATION: 99 % | RESPIRATION RATE: 17 BRPM | HEART RATE: 87 BPM | TEMPERATURE: 98 F

## 2020-09-10 DIAGNOSIS — Z98.890 OTHER SPECIFIED POSTPROCEDURAL STATES: Chronic | ICD-10-CM

## 2020-09-10 PROCEDURE — 99284 EMERGENCY DEPT VISIT MOD MDM: CPT

## 2020-09-10 RX ORDER — CEPHALEXIN 500 MG
1 CAPSULE ORAL
Qty: 20 | Refills: 0
Start: 2020-09-10 | End: 2020-09-14

## 2020-09-10 RX ORDER — CEPHALEXIN 500 MG
500 CAPSULE ORAL ONCE
Refills: 0 | Status: COMPLETED | OUTPATIENT
Start: 2020-09-10 | End: 2020-09-10

## 2020-09-10 RX ADMIN — Medication 500 MILLIGRAM(S): at 23:12

## 2020-09-10 NOTE — ED ADULT TRIAGE NOTE - CHIEF COMPLAINT QUOTE
pt arrives w/ c/o left leg pain . pt states PMD sent to ER for US of leg to r/o DVTs. pt arrives with redness and swelling to left leg from thigh to ankle. pt ambulatory to triage.

## 2020-09-10 NOTE — ED PROVIDER NOTE - CLINICAL SUMMARY MEDICAL DECISION MAKING FREE TEXT BOX
48 y/o F hx of previous whipple, anemia, asthma, with 1 day of leg swelling. Sent for r/o DVT vs cellulitis. Venous stasis changes distal to knee, proximal small area of erythematous maculopapular rash. Neurovasc intact, full ROM. Will US to r/o DVT, if positive DC on AC, if negative dc with derm f/u 50 y/o F hx of previous whipple, anemia, asthma, with 1 day of leg swelling. Sent for r/o DVT vs cellulitis. Venous stasis changes distal to knee, proximal small area of erythematous maculopapular rash. Neurovasc intact, full ROM. Upper aspect of thigh consistent with cellulitic changes. Will US to r/o DVT, if positive DC on AC, if negative dc with derm f/u 50 y/o F hx of previous whipple, anemia, asthma, with 1 day of leg swelling. Sent for r/o DVT vs cellulitis. Venous stasis changes distal to knee, proximal small area of erythematous maculopapular rash. Neurovasc intact, full ROM. Upper aspect of thigh consistent with cellulitic changes. Will US to r/o DVT, if positive DC on AC and abx, if neg just abx

## 2020-09-10 NOTE — ED PROVIDER NOTE - NS ED ROS FT
Constitutional: (-) fever (-) vomiting  Eyes/ENT: (-) vision changes, (-) hearing changes  Cardiovascular: (-) chest pain, (-) wheezing  Respiratory: (-) cough, (-) shortness of breath  Gastrointestinal: (-) vomiting, (+) diarrhea, (-) abdominal pain  : (-) dysuria   Musculoskeletal: (-) back pain  Integumentary: (+) rash, (+) edema  Neurological: (-)loc  Allergic/Immunologic: (-) pruritus  Endocrine: No history of thyroid disease

## 2020-09-10 NOTE — ED PROVIDER NOTE - ATTENDING CONTRIBUTION TO CARE
DR. YEE, ATTENDING MD-  I performed a face to face bedside interview with the patient regarding history of present illness, review of symptoms and past medical history. I completed an independent physical exam.  I have discussed the patient's plan of care with the resident.   Documentation as above in the note.    48 y/o female h/o yungipples here with lle redness pain and swelling.  No fall or other trauma.  No h/o dvt or pe in past.  Not on ocp.  LLE is erythematous swollen ttp full rom hip/knee/ankle, distally nv intact.  C/w cellulitis, evaluate for underlying dvt.  Obtain venous duplex lle.  Pt declines obs stay for iv abx, antic dc with po abx, close pcp f/u.

## 2020-09-10 NOTE — ED PROVIDER NOTE - PHYSICAL EXAMINATION
Vitals: I have reviewed the patients vital signs.   General: thin appearing, well nourished, no acute distress  HEENT: atraumatic, normocephalic, airway patent, EOMI and appropriate tracking  Neck: no JVD, no tracheal deviation, no goiter  Chest/Lungs: no trauma, symmetric chest rise, lung sounds clear bilaterally, speaking in complete sentences  Heart: Regular rate, regular rhythm, S1S2, no MRG, skin well perfused  Abdomen: Soft and nontender throughout, no rebound or guarding  Neuro: A+Ox3, ambulating without difficulty, non dysarthric speech  MSK: LLE dusky skin changes consistent with venous stasis distal to knee, 2+ pitting edema, palpable DP pulse, able to move toes, sensory function intact. Able to range knee and ankle.   Skin: Above knee there is an area of erythema and maculopapular rash in a linear pattern, no ulceration or bleeding, negative [place], no vesicles. Lateral to L knee there is an area of blackened skin that she says is from an old scar. Vitals: I have reviewed the patients vital signs.   General: thin appearing, well nourished, no acute distress  HEENT: atraumatic, normocephalic, airway patent, EOMI and appropriate tracking  Neck: no JVD, no tracheal deviation, no goiter  Chest/Lungs: no trauma, symmetric chest rise, lung sounds clear bilaterally, speaking in complete sentences  Heart: Regular rate, regular rhythm, S1S2, no MRG, skin well perfused  Abdomen: Soft and nontender throughout, no rebound or guarding  Neuro: A+Ox3, ambulating without difficulty, non dysarthric speech  MSK: LLE dusky skin changes consistent with venous stasis distal to knee, 2+ pitting edema, palpable DP pulse, able to move toes, sensory function intact. Knee no effusion. Able to range knee and ankle.   Skin: Above knee there is an area of erythema and maculopapular rash in a linear pattern, no ulceration or bleeding, negative [place], no vesicles. Lateral to L knee there is an area of blackened skin that she says is from an old scar. Vitals: I have reviewed the patients vital signs.   General: thin appearing, well nourished, no acute distress  HEENT: atraumatic, normocephalic, airway patent, EOMI and appropriate tracking  Neck: no JVD, no tracheal deviation, no goiter  Chest/Lungs: no trauma, symmetric chest rise, lung sounds clear bilaterally, speaking in complete sentences  Heart: Regular rate, regular rhythm, S1S2, no MRG, skin well perfused  Abdomen: Soft and nontender throughout, no rebound or guarding  Neuro: A+Ox3, ambulating without difficulty, non dysarthric speech  MSK: LLE dusky skin changes consistent with venous stasis distal to knee, 2+ pitting edema, palpable DP pulse, able to move toes, sensory function intact. Knee no effusion. Able to range knee and ankle.   Skin: Above knee there is a confluent area of warm, erythematous and maculopapular rash in a approximately 6cm diameter [place], no ulceration or bleeding, negative [place], no vesicles. Lateral to L knee there is an area of blackened skin that she says is from an old scar. Distal to knee brauny changes consistent with venous stasis.

## 2020-09-10 NOTE — ED PROVIDER NOTE - OBJECTIVE STATEMENT
48 y/o F hx previous whipple for benign pancreatic mass, anemia, asthma presents with 1 day of LLE swelling and redness, sent from  for evaluation of DVT vs Cellulitis. States she had extremity swelling earlier this year, but it was bilateral and not similar to this episode. Describes an area of pain around the knee. No pruritus. No recent trauma or bug bites. Has also had diarrhea x3 days. Denies CP, SOB, n/v, abd pain, syncope. No recent travel, no recent surgeries, no estrogen containing medications

## 2020-09-10 NOTE — ED PROVIDER NOTE - PROGRESS NOTE DETAILS
DON COOPER:  Sono negative for DVT.  Rx for antibiotics sent by Dr. Cam.  Pt medically stable for discharge.  Strict return precautions given.  Pt instructed to have repeat duplex in 1 week for any continued symptoms.  Reassessment performed and plan for discharge discussed with Dr. Moreno who agrees with disposition and discharge plan.

## 2020-09-10 NOTE — ED PROVIDER NOTE - NSFOLLOWUPINSTRUCTIONS_ED_ALL_ED_FT
You came to the ER with a rash and swelling. We did an ultrasound and found _______. We sent _______ to your pharmacy. You should follow up with ________, Please return if you develop difficulty breathing, chest pain, pass out, feel weak or feverish, notice an increase or change in pain, or are otherwise concerned and would like re evaluation. You came to the ER with a rash and swelling. We did an ultrasound and found _______. We sent an antibiotic to your pharmacy. You should follow up with ________, Please return if you develop difficulty breathing, chest pain, pass out, feel weak or feverish, notice an increase or change in pain, or are otherwise concerned and would like re evaluation. You came to the ER with a rash and swelling. We did an ultrasound and found no DVT.  We sent an antibiotic to your pharmacy. You should follow up with your primary physician in 2-3 days.  Please return if you develop difficulty breathing, chest pain, pass out, feel weak or feverish, notice an increase or change in pain, swelling, or are otherwise concerned and would like re evaluation.

## 2020-09-10 NOTE — ED PROVIDER NOTE - PATIENT PORTAL LINK FT
You can access the FollowMyHealth Patient Portal offered by [facility] by registering at the following website: [email or web address]. By joining [employer]’s FollowMyHealth portal, you will also be able to view your health information using other applications (apps) compatible with our system.

## 2020-09-11 VITALS
DIASTOLIC BLOOD PRESSURE: 42 MMHG | HEART RATE: 67 BPM | RESPIRATION RATE: 18 BRPM | OXYGEN SATURATION: 100 % | SYSTOLIC BLOOD PRESSURE: 115 MMHG | TEMPERATURE: 98 F

## 2020-09-11 PROCEDURE — 93971 EXTREMITY STUDY: CPT | Mod: 26,LT

## 2020-11-17 NOTE — PROGRESS NOTE ADULT - PROBLEM/PLAN-9
Statement Selected
DISPLAY PLAN FREE TEXT

## 2021-10-26 NOTE — PROGRESS NOTE ADULT - PROBLEM SELECTOR PROBLEM 5
Please call the office with any major changes to your health if it could affect your upcoming procedure.    We understand circumstances happen; if you need to reschedule your procedure, we would appreciate a 5-day notice if possible, so we have the opportunity to adjust the schedule. -Thank you    Pre-surgical COVID-19 Testing - Patient Instructions    Your physician has ordered a pre-procedure/surgical COVID-19 test.    You’ve been scheduled for a COVID-19 test at [facility] at [address] [place] [address] on November 26th at 2:50pm      - If you test positive, you will be notified by your surgeon/proceduralist prior to your surgery/procedure who will consider rescheduling or delaying when able; otherwise, the negative result will be relayed to you on the day of your procedure    - If you start to experience any of the below symptoms after you receive your COVID-19 test and prior to your procedure/surgery, you are required to call the physician’s clinic to determine if procedure/surgery will still occur or be completed    o Temperature: Fever > 100.4    o Respiratory Symptoms: new or worsening cough, shortness of breath, sore throat    o GI Symptoms: New onset of nausea, vomiting or diarrhea    o New onset of loss of taste or smell      - If you are unable to attend your COVID-19 testing appointment on the date listed above, you are required to call your physician to reschedule        Tips for Staying Healthy    - Please avoid close contact with people who are sick, yet please know that people may not show obvious symptoms    - Stay home when you are sick, except to get medical care    - Hand hygiene: Wash hands often with soap and water for at least 20 seconds especially after being in a public place, after blowing their nose, coughing or sneezing. If soap and water are not readily available, use a hand  that contains at least 60% alcohol - cover all surfaces of your  hands and rub them together until they feel dry    - Avoid touching eyes, nose and mouth with unwashed hands    - Cough or sneeze inside your elbow if possible    - Keep a minimum of 6 feet between yourself and others    - Wear a mask when you go out in public to protect other people in case you are infected. Masks are not a substitute for social distancing. Cloth masks should not be placed on young children under age 2, anyone who has trouble breathing, is unconscious, incapacitated or otherwise unable to remove the mask without assistance    - Clean and disinfect frequently touched surfaces daily. This includes tables, doorknobs, light switches, countertops, handles, desks, phones, keyboards, toilets, faucets and sinks      PROCEDURE: ____________________________         Colonoscopy Golytely Prep    No alcohol or illegal drugs 48 hours prior to the procedure. If you consume marijuana products or \"weed\" within 24 hours prior to your procedure, your procedure will be cancelled due to hospital policy.     Please  Golytely/Dulcolax at the [facility].    One Week Prior to the Procedure:  November 22nd    · Stop Iron Supplements including multivitamins  · Avoid eating popcorn, seeds and nuts    The Day Prior to the Procedure: November 28th    · Breakfast: You can have two eggs and two slices of toast with butter/margarine OR two slices of toast with butter/margarine and a cup of cottage cheese.   · Lunch: One cup of macaroni and cheese OR one cup of white rice and 8oz vanilla yogurt.   · Thereafter continue to consume full liquid diet which includes such things as white milk, plain vanilla ice cream, plain vanilla pudding, plain vanilla yogurt, Ensure/Boost UNTIL 6 PM.    · May have clear liquid diet as stated below all day UNTIL MIDNIGHT.   · Such as: coffee, tea, broth (without noodles), Jell-O (without fruit), clear fruit juice (like white grape or apple), 7-Up, Popsicles and Nilay-Aid. Do not  drink pulpy juices such as orange juice.   · DO NOT EAT ANYTHING WITH FRUIT, VEGETABLES, NUTS OR SEEDS. AVOID ANYTHING RED OR PURPLE IN COLOR.  · Take 2 Dulcolax tablets at 12:00 noon.  · Drink 1/2 gallon of Golytely between 5:00 pm and 7:00 pm (you can mix with sugar-free Crystal Light or Gatorade, if desired. No red or purple colors).  · Nothing to eat or drink after midnight except for the remaining Golytely  in the morning as directed.    THE MORNING OF THE PROCEDURE: November 29th    · Drink the remaining half gallon of Golytely 5 hours prior to the procedure time (between 3:30am- 5:30am)  · NOTHING more to eat or drink.  · If unable to finish prep in the morning, or if there is formed stool present and not clear call Same Day Surgery for further instructions [phone]        If diabetic monitor blood sugars throughout morning.   · Insulin dose should be half the usual dose the night before, unless otherwise directed.  · Do not take oral diabetic medications the morning of your procedure.  · All other prescribed medication should be taken as usual (these will not affect the prep)  · Please arrange transportation back to your home following the procedure. Buses or cabs are not allowed.    [facility] - Same Day Surgery 2nd Floor    Date and Time: November 29th at 8:30am  (arrive at 7:30am)    The patient should arrive at the hospital 1 hour prior to the procedure time     Reminder: please check with your Insurance Company to see if this procedure will be covered.    If you have any questions about the prep, please call our office at [phone].    Your procedure will be performed by Gastroenterologist: Albert Cisse MD.   SCOTT (acute kidney injury)

## 2021-12-27 NOTE — PROCEDURAL SAFETY CHECKLIST WITH OR WITHOUT SEDATION - [id number]_GEN_ALL_CORE
Discharge Planner Post-Acute Rehab SLP:      Discharge Plan: [facility] , further SLP homecare     Precautions: sternal, fall     Current Status:  Communication: WFL.   Cognition: Mild  cognitive linguistic impairments with deficits re: attention, exeuctive function, memory, and visuospatial skills, word finding.  Swallow: Regular, thin liquid. Medications to be given orally with thin liquids. Will continue to monitor.      Assessment:  SLP: worked on moderate complex task for planning, reasoning  min cueing needed for accuracy, correcting errors.Also went through medication list, reviewed procedure for MAP, pt knew indication for most.       Other Barriers to Discharge (Family Training, etc):    done

## 2023-01-12 NOTE — H&P ADULT - PROBLEM SELECTOR PLAN 6
I have reviewed and confirmed nurses' notes...
Pt with leukocytosis and elevated temp, suspect possible infection vs reactive  -will start abx (vanc and zosyn)  -f/u bcx, ucx, COVID

## 2023-10-10 NOTE — PRE-OP CHECKLIST - PATIENT PROBLEMS/NEEDS
Called patient and she wanted to get her left BRYAN rescheduled.  Set her up for 11/14/23.  Made appt with Dr. Mccray for surgery clearance for 11/01/23 @ 8:30a.m. and she is to arrive at 7:30 for labs, CXR and EKG.  She is also to have her CT of left hip at 10:00 that same day.    
Patient expressed no known problems or needs
Patient expressed no known problems or needs

## 2024-04-22 NOTE — PROCEDURE NOTE - ESTIMATED BLOOD LOSS
Problem: Adult Inpatient Plan of Care  Goal: Plan of Care Review  Outcome: Ongoing, Progressing  Flowsheets (Taken 4/21/2024 1900)  Plan of Care Reviewed With:   patient   spouse  Goal: Patient-Specific Goal (Individualized)  Outcome: Ongoing, Progressing  Flowsheets (Taken 4/21/2024 1900)  Anxieties, Fears or Concerns: Right leg pain  Individualized Care Needs: pain management, NPO pending MBS results, safety with transfers to [facility], meds crushed in applesauce  Goal: Absence of Hospital-Acquired Illness or Injury  Outcome: Ongoing, Progressing  Intervention: Identify and Manage Fall Risk  Flowsheets (Taken 4/21/2024 1900)  Safety Promotion/Fall Prevention:   assistive device/personal item within reach   bed alarm set   medications reviewed   nonskid shoes/socks when out of bed   side rails raised x 3   instructed to call staff for mobility  Intervention: Prevent Skin Injury  Flowsheets (Taken 4/21/2024 1900)  Body Position:   position changed independently   log-rolled   weight shifting   sitting up in bed   supine  Skin Protection:   adhesive use limited   incontinence pads utilized   transparent dressing maintained   tubing/devices free from skin contact  Intervention: Prevent and Manage VTE (Venous Thromboembolism) Risk  Flowsheets (Taken 4/21/2024 1900)  Activity Management: Rolling - L1  VTE Prevention/Management:   bleeding precations maintained   bleeding risk assessed  Intervention: Prevent Infection  Flowsheets (Taken 4/21/2024 1900)  Infection Prevention:   cohorting utilized   environmental surveillance performed   equipment surfaces disinfected   hand hygiene promoted   single patient room provided   rest/sleep promoted  Goal: Optimal Comfort and Wellbeing  Outcome: Ongoing, Progressing  Intervention: Monitor Pain and Promote Comfort  Flowsheets (Taken 4/21/2024 1900)  Pain Management Interventions:   care clustered   pillow support provided   position adjusted   medication offered  Intervention: 
Provide Person-Centered Care  Flowsheets (Taken 4/21/2024 1900)  Trust Relationship/Rapport:   care explained   questions answered   questions encouraged  Goal: Readiness for Transition of Care  Outcome: Ongoing, Progressing     Problem: Infection  Goal: Absence of Infection Signs and Symptoms  Outcome: Ongoing, Progressing  Intervention: Prevent or Manage Infection  Flowsheets (Taken 4/21/2024 1900)  Infection Management: aseptic technique maintained     Problem: Fall Injury Risk  Goal: Absence of Fall and Fall-Related Injury  Outcome: Ongoing, Progressing  Intervention: Identify and Manage Contributors  Flowsheets (Taken 4/21/2024 1900)  Self-Care Promotion:   independence encouraged   BADL personal objects within reach   meal set-up provided   safe use of adaptive equipment encouraged  Medication Review/Management: medications reviewed  Intervention: Promote Injury-Free Environment  Flowsheets (Taken 4/21/2024 1900)  Safety Promotion/Fall Prevention:   assistive device/personal item within reach   bed alarm set   medications reviewed   nonskid shoes/socks when out of bed   side rails raised x 3   instructed to call staff for mobility     Problem: Swallowing Impairment  Goal: Optimal Eating and Swallowing Without Aspiration  Outcome: Ongoing, Progressing  Intervention: Optimize Eating and Swallowing  Flowsheets (Taken 4/21/2024 1900)  Aspiration Precautions: awake/alert before oral intake     Problem: Hypertension Comorbidity  Goal: Blood Pressure in Desired Range  Outcome: Ongoing, Progressing  Intervention: Maintain Blood Pressure Management  Flowsheets (Taken 4/21/2024 1900)  Medication Review/Management: medications reviewed     Problem: Osteoarthritis Comorbidity  Goal: Maintenance of Osteoarthritis Symptom Control  Outcome: Ongoing, Progressing  Intervention: Maintain Osteoarthritis Symptom Control  Flowsheets (Taken 4/21/2024 1900)  Activity Management: Rolling - L1  Medication Review/Management: 
medications reviewed     Problem: Pain Chronic (Persistent) (Comorbidity Management)  Goal: Acceptable Pain Control and Functional Ability  Outcome: Ongoing, Progressing  Intervention: Develop Pain Management Plan  Flowsheets (Taken 4/21/2024 1900)  Pain Management Interventions:   care clustered   pillow support provided   position adjusted   medication offered  Intervention: Manage Persistent Pain  Flowsheets (Taken 4/21/2024 1900)  Bowel Elimination Promotion:   adequate fluid intake promoted   ambulation promoted  Medication Review/Management: medications reviewed     
None
None